# Patient Record
Sex: FEMALE | Race: BLACK OR AFRICAN AMERICAN | NOT HISPANIC OR LATINO | Employment: FULL TIME | URBAN - METROPOLITAN AREA
[De-identification: names, ages, dates, MRNs, and addresses within clinical notes are randomized per-mention and may not be internally consistent; named-entity substitution may affect disease eponyms.]

---

## 2018-05-07 LAB — PAP SMEAR: NORMAL

## 2019-01-28 ENCOUNTER — TELEPHONE (OUTPATIENT)
Dept: OPHTHALMOLOGY | Facility: CLINIC | Age: 59
End: 2019-01-28

## 2019-01-28 NOTE — TELEPHONE ENCOUNTER
----- Message from Chantal Johnson sent at 1/28/2019 12:52 PM CST -----  Type: Needs Medical Advice    Who Called: Patient  Symptoms (please be specific): hemorrhage behind the eye  How long has patient had these symptoms:  ongoing    Best Call Back Number:[phone] (home)     Additional Information:Stating was under the care of Dr. Gianluca Figueredo, due to insurance have to change opthamalogist. Stating was being treated hemorrhaging behind the eye

## 2019-01-30 ENCOUNTER — TELEPHONE (OUTPATIENT)
Dept: OPHTHALMOLOGY | Facility: CLINIC | Age: 59
End: 2019-01-30

## 2019-01-30 NOTE — TELEPHONE ENCOUNTER
----- Message from Yadira Hurtado sent at 1/30/2019  3:32 PM CST -----  Contact: Nohelia  Type: Needs Medical Advice    Who Called:  patient  Symptoms (please be specific):  Est care/issues behind the eye  Best Call Back Number: [phone]  Additional Information: Dr. Gianluca Figueredo referred her to Dr. Herrera--patient would like to see Dr. Herrera as soon as possible--please advise--thank you

## 2019-02-05 ENCOUNTER — PATIENT OUTREACH (OUTPATIENT)
Dept: ADMINISTRATIVE | Facility: HOSPITAL | Age: 59
End: 2019-02-05

## 2019-02-05 NOTE — LETTER
February 5, 2019    Nohelia Rodriguez  [address] [facility] [address]             [facility]  [address]  [facility] [address]  Phone: [phone] Dear Sharon, [facility] is committed to your overall health.  To help you get the most out of each of your visits, we will review your information to make sure you are up to date on all of your recommended tests and/or procedures.      As a new patient to Dr. Barrett , we may not have your complete medical records.  She has found that your chart shows you may be due for a One-time Hepatitis C Screening lab test(a viral condition that can harm the liver), Fasting cholesterol labs (Lipid Panel), Pap smear, Tetanus Immunization, Colon cancer screening, and Influenza Vaccine.     If you have had any of the above done at another facility, please bring the records or information with you so that your record at [facility] will be complete.      If you are currently taking medication, please bring it with you to your appointment for review.    Thank you for letting us care for you,    Sincerely,  Tiffany Granger  Clinical Care Coordinator  Phone: [phone]   Fax: [phone]

## 2019-02-05 NOTE — PROGRESS NOTES
Health Maintenance Due   Topic Date Due    Hepatitis C Screening  1960    Lipid Panel  1960    Pap Smear with HPV Cotest  02/21/1981    TETANUS VACCINE  05/11/2005    Colonoscopy  02/21/2010    Influenza Vaccine  08/01/2018     Pre-visit outreach via mail

## 2019-02-18 ENCOUNTER — TELEPHONE (OUTPATIENT)
Dept: GASTROENTEROLOGY | Facility: CLINIC | Age: 59
End: 2019-02-18

## 2019-02-18 ENCOUNTER — INITIAL CONSULT (OUTPATIENT)
Dept: OPHTHALMOLOGY | Facility: CLINIC | Age: 59
End: 2019-02-18
Payer: COMMERCIAL

## 2019-02-18 VITALS — HEART RATE: 89 BPM | SYSTOLIC BLOOD PRESSURE: 139 MMHG | DIASTOLIC BLOOD PRESSURE: 80 MMHG

## 2019-02-18 DIAGNOSIS — Z79.4 CONTROLLED TYPE 2 DIABETES MELLITUS WITH BOTH EYES AFFECTED BY PROLIFERATIVE RETINOPATHY AND MACULAR EDEMA, WITH LONG-TERM CURRENT USE OF INSULIN: Primary | ICD-10-CM

## 2019-02-18 DIAGNOSIS — H25.13 NS (NUCLEAR SCLEROSIS), BILATERAL: ICD-10-CM

## 2019-02-18 DIAGNOSIS — H35.012 RETINAL MACROANEURYSM, LEFT EYE: ICD-10-CM

## 2019-02-18 DIAGNOSIS — E11.3513 CONTROLLED TYPE 2 DIABETES MELLITUS WITH BOTH EYES AFFECTED BY PROLIFERATIVE RETINOPATHY AND MACULAR EDEMA, WITH LONG-TERM CURRENT USE OF INSULIN: Primary | ICD-10-CM

## 2019-02-18 PROCEDURE — 92004 COMPRE OPH EXAM NEW PT 1/>: CPT | Mod: 25,S$GLB,, | Performed by: OPHTHALMOLOGY

## 2019-02-18 PROCEDURE — 99999 PR PBB SHADOW E&M-EST. PATIENT-LVL III: CPT | Mod: PBBFAC,,, | Performed by: OPHTHALMOLOGY

## 2019-02-18 PROCEDURE — 67028 INJECTION EYE DRUG: CPT | Mod: LT,S$GLB,, | Performed by: OPHTHALMOLOGY

## 2019-02-18 PROCEDURE — 92004 PR EYE EXAM, NEW PATIENT,COMPREHESV: ICD-10-PCS | Mod: 25,S$GLB,, | Performed by: OPHTHALMOLOGY

## 2019-02-18 PROCEDURE — 67028 PR INJECT INTRAVITREAL PHARMCOLOGIC: ICD-10-PCS | Mod: LT,S$GLB,, | Performed by: OPHTHALMOLOGY

## 2019-02-18 PROCEDURE — 99999 PR PBB SHADOW E&M-EST. PATIENT-LVL III: ICD-10-PCS | Mod: PBBFAC,,, | Performed by: OPHTHALMOLOGY

## 2019-02-18 RX ORDER — METFORMIN HYDROCHLORIDE 1000 MG/1
1000 TABLET ORAL 2 TIMES DAILY WITH MEALS
COMMUNITY
Start: 2019-02-06 | End: 2020-02-06 | Stop reason: SDUPTHER

## 2019-02-18 RX ORDER — LATANOPROST 50 UG/ML
1 SOLUTION/ DROPS OPHTHALMIC NIGHTLY
COMMUNITY
Start: 2019-02-11

## 2019-02-18 RX ORDER — PIOGLITAZONEHYDROCHLORIDE 15 MG/1
TABLET ORAL
COMMUNITY
Start: 2019-02-11 | End: 2019-05-03

## 2019-02-18 RX ORDER — CYCLOBENZAPRINE HCL 10 MG
TABLET ORAL
COMMUNITY
Start: 2018-11-15 | End: 2019-05-03 | Stop reason: ALTCHOICE

## 2019-02-18 RX ORDER — INSULIN GLARGINE 100 [IU]/ML
INJECTION, SOLUTION SUBCUTANEOUS
COMMUNITY
Start: 2019-01-24 | End: 2020-01-13 | Stop reason: SDUPTHER

## 2019-02-18 RX ORDER — DILTIAZEM HYDROCHLORIDE EXTENDED-RELEASE TABLETS 300 MG/1
TABLET, EXTENDED RELEASE ORAL
COMMUNITY
Start: 2019-01-30 | End: 2019-05-03 | Stop reason: SDUPTHER

## 2019-02-18 RX ORDER — IRBESARTAN AND HYDROCHLOROTHIAZIDE 300; 12.5 MG/1; MG/1
TABLET, FILM COATED ORAL
COMMUNITY
Start: 2018-12-05 | End: 2019-06-14 | Stop reason: SDUPTHER

## 2019-02-18 RX ORDER — GLIMEPIRIDE 2 MG/1
TABLET ORAL
COMMUNITY
Start: 2019-01-24 | End: 2019-05-03

## 2019-02-18 RX ORDER — DILTIAZEM HYDROCHLORIDE 240 MG/1
CAPSULE, EXTENDED RELEASE ORAL
COMMUNITY
Start: 2019-01-24 | End: 2019-05-03

## 2019-02-18 RX ADMIN — Medication 1.25 MG: at 03:02

## 2019-02-18 NOTE — TELEPHONE ENCOUNTER
----- Message from Delores Pavon sent at 2/18/2019  9:46 AM CST -----  Contact: Patient  Type: Needs Medical Advice    Who Called:  Patient  Best Call Back Number:   Additional Information: Patient is calling to schedule her colonoscopy.Please call back and advise.

## 2019-02-18 NOTE — LETTER
February 18, 2019      JUAN Marie MD  [address]  [address]  [facility]  [facility] [facility] [address]           [facility]  [address] [facility] [address] [place] [address]  Phone: [phone]  Fax: [phone]          Patient: Nohelia Rodriguez   MR Number: [id number]   YOB: 1960   Date of Visit: 2/18/2019       Dear Dr. JUAN Marie:    Thank you for referring Nohelia Rodriguez to me for evaluation. Attached you will find relevant portions of my assessment and plan of care.    If you have questions, please do not hesitate to call me. I look forward to following Nohelia Rodriguez along with you.    Sincerely,    RUFINO Herrera MD    Enclosure  CC:  No Recipients    If you would like to receive this communication electronically, please contact [email or web address] or [phone] to request more information on [employer] access.    For providers and/or their staff who would like to refer a patient to [facility], please contact us through our one-stop-shop provider referral line, [facility], at [phone].    If you feel you have received this communication in error or would no longer like to receive these types of communications, please e-mail [email or web address]

## 2019-02-18 NOTE — PATIENT INSTRUCTIONS
.POST INTRAVITREAL INJECTION PATIENT INSTRUCTIONS   Below are some guidelines for what to expect after your treatment and additional care instructions.   * you may experience mild discomfort in your eye after the treatment. Your eye usually feels better within 24 to 48 hours after the procedure.   * you have been given drops that numb the surface of your eye.   DO NOT rub or touch your eye, DO NOT wear contacts until numbness goes away.   * you may experience small spots that appear in your field of vision, these are usually caused by an air bubble or from the medication. It taked a few hours or days for these to go away.   * use of sunglasses will help reduce light sensitivity and glare.   * DO NOT swim   for at least 3 hours after the injection   * If you have a gritty, burning, irritating or stinging feeling in the injected eye. This may be a result of the antiseptic used. Use artifical tears or eye lubricant ( from over- the- counter from your local pharmacy ). If you have some at home already please check the expiration date, so not to use anything contaminated in your eye. A cool pack over your eye brow above the injected eye may also relieve discomfort.   Call us right away or go to the Emergency Department if you have a dramatic decrease in vision or extreme pain in the eye.   [facility] [phone]    .Controlling High Blood Pressure  High blood pressure (hypertension) is often called the silent killer. This is because many people who have it dont know it. High blood pressure is defined as 140/90 mm Hg or higher. Know your blood pressure and remember to check it regularly. Doing so can save your life. Here are some things you can do to help control your blood pressure.    Choose heart-healthy foods  · Select low-salt, low-fat foods. Limit sodium intake to 2,400 mg per day or the amount suggested by your healthcare provider.  · Limit canned, dried, cured, packaged, and fast foods. These  can contain a lot of salt.  · Eat 8 to 10 servings of fruits and vegetables every day.  · Choose lean meats, fish, or chicken.  · Eat whole-grain pasta, brown rice, and beans.  · Eat 2 to 3 servings of low-fat or fat-free dairy products.  · Ask your doctor about the DASH eating plan. This plan helps reduce blood pressure.  · When you go to a restaurant, ask that your meal be prepared with no added salt.  Maintain a healthy weight  · Ask your healthcare provider how many calories to eat a day. Then stick to that number.  · Ask your healthcare provider what weight range is healthiest for you. If you are overweight, a weight loss of only 3% to 5% of your body weight can help lower blood pressure. Generally, a good weight loss goal is to lose 10% of your body weight in a year.  · Limit snacks and sweets.  · Get regular exercise.  Get up and get active  · Choose activities you enjoy. Find ones you can do with friends or family. This includes bicycling, dancing, walking, and jogging.  · Park farther away from building entrances.  · Use stairs instead of the elevator.  · When you can, walk or bike instead of driving.  · [place] leaves, garden, or do household repairs.  · Be active at a moderate to vigorous level of physical activity for at least 40 minutes for a minimum of 3 to 4 days a week.   Manage stress  · Make time to relax and enjoy life. Find time to laugh.  · Communicate your concerns with your loved ones and your healthcare provider.  · Visit with family and friends, and keep up with hobbies.  Limit alcohol and quit smoking  · Men should have no more than 2 drinks per day.  · Women should have no more than 1 drink per day.  · Talk with your healthcare provider about quitting smoking. Smoking significantly increases your risk for heart disease and stroke. Ask your healthcare provider about community smoking cessation programs and other options.  Medicines  If lifestyle changes arent enough, your healthcare provider  may prescribe high blood pressure medicine. Take all medicines as prescribed. If you have any questions about your medicines, ask your healthcare provider before stopping or changing them.    © 5189-0518 [employer]. [address] [place] [address], [place] [address]. All rights reserved. This information is not intended as a substitute for professional medical care. Always follow your healthcare professional's instructions.

## 2019-02-18 NOTE — PROGRESS NOTES
HPI     Eye Problem      Additional comments: heme OS              Comments     Patient was getting injections OS with Dr Marie until insurance changed. She was last injected in December. She had been getting injections OD also but OD has been stable for a while now. She is also DM which is well controlled per patient    LBS=75 this am  A1C=7. ?(1/19)    Latanoprost qhs OU          Last edited by Digna Castillo on 2/18/2019  2:33 PM. (History)        OCT - OD - no ME  OS - temporal ME a/w EMILE      A/P    1. PDR OU  S/p PRP OD with PSO'S  ?not high risk OS - will follow up FA    2. DME OS -   A/w EMILE  S/p multiple injections with PSO'S    Avastin OS today    Will need laser to EMILE    3. HTN Ret OU  BS/BP/chol control    4. NS OU  No VS      1 month OCT/FA OS    Risks, benefits, and alternatives to treatment discussed in detail with the patient.  The patient voiced understanding and wished to proceed with the procedure    Injection Procedure Note:  Diagnosis: DME OS    Patient Identified and Time Out complete  Topical Proparacaine and Betadine.  Inject Avastin OS at 6:00 @ 3.5-4mm posterior to limbus  Post Operative Dx: Same  Complications: None  Follow up as above.

## 2019-02-19 ENCOUNTER — TELEPHONE (OUTPATIENT)
Dept: OPHTHALMOLOGY | Facility: CLINIC | Age: 59
End: 2019-02-19

## 2019-03-01 ENCOUNTER — TELEPHONE (OUTPATIENT)
Dept: OPHTHALMOLOGY | Facility: CLINIC | Age: 59
End: 2019-03-01

## 2019-03-01 NOTE — TELEPHONE ENCOUNTER
----- Message from Thomas Edwards sent at 3/1/2019  9:52 AM CST -----  Type: Needs Medical Advice    Who Called:  Patient    Best Call Back Number: [phone]  Additional Information: Patient states that she would like a callback regarding rescheduling her procedure on 03/21

## 2019-03-04 ENCOUNTER — TELEPHONE (OUTPATIENT)
Dept: OPTOMETRY | Facility: CLINIC | Age: 59
End: 2019-03-04

## 2019-03-04 NOTE — TELEPHONE ENCOUNTER
----- Message from Karlos Valentine sent at 3/4/2019  1:40 PM CST -----  Contact: Patient  Type: Needs Medical Advice    Who Called:  Patient  Best Call Back Number: [phone]  Additional Information: Patient is still waiting for a call back to reschedule an appointment. Please call to advise. Thanks!

## 2019-03-22 ENCOUNTER — ANESTHESIA (OUTPATIENT)
Dept: ENDOSCOPY | Facility: HOSPITAL | Age: 59
End: 2019-03-22
Payer: COMMERCIAL

## 2019-03-22 ENCOUNTER — ANESTHESIA EVENT (OUTPATIENT)
Dept: ENDOSCOPY | Facility: HOSPITAL | Age: 59
End: 2019-03-22
Payer: COMMERCIAL

## 2019-03-22 ENCOUNTER — TELEPHONE (OUTPATIENT)
Dept: GASTROENTEROLOGY | Facility: CLINIC | Age: 59
End: 2019-03-22

## 2019-03-22 ENCOUNTER — HOSPITAL ENCOUNTER (OUTPATIENT)
Facility: HOSPITAL | Age: 59
Discharge: HOME | End: 2019-03-22
Attending: INTERNAL MEDICINE | Admitting: INTERNAL MEDICINE
Payer: COMMERCIAL

## 2019-03-22 VITALS
TEMPERATURE: 98 F | WEIGHT: 200 LBS | HEART RATE: 74 BPM | OXYGEN SATURATION: 98 % | BODY MASS INDEX: 29.62 KG/M2 | DIASTOLIC BLOOD PRESSURE: 68 MMHG | HEIGHT: 69 IN | SYSTOLIC BLOOD PRESSURE: 140 MMHG | RESPIRATION RATE: 16 BRPM

## 2019-03-22 DIAGNOSIS — Z12.11 ENCOUNTER FOR SCREENING COLONOSCOPY: Primary | ICD-10-CM

## 2019-03-22 DIAGNOSIS — Z12.11 SCREEN FOR COLON CANCER: ICD-10-CM

## 2019-03-22 LAB — GLUCOSE SERPL-MCNC: 82 MG/DL (ref 70–110)

## 2019-03-22 PROCEDURE — D9220A PRA ANESTHESIA: ICD-10-PCS | Mod: ANES,,, | Performed by: NURSE ANESTHETIST, CERTIFIED REGISTERED

## 2019-03-22 PROCEDURE — D9220A PRA ANESTHESIA: Mod: ANES,,, | Performed by: NURSE ANESTHETIST, CERTIFIED REGISTERED

## 2019-03-22 PROCEDURE — 37000008 HC ANESTHESIA 1ST 15 MINUTES: Mod: PO | Performed by: INTERNAL MEDICINE

## 2019-03-22 PROCEDURE — G0121 COLON CA SCRN NOT HI RSK IND: HCPCS | Mod: 53,,, | Performed by: INTERNAL MEDICINE

## 2019-03-22 PROCEDURE — D9220A PRA ANESTHESIA: Mod: CRNA,,, | Performed by: ANESTHESIOLOGY

## 2019-03-22 PROCEDURE — D9220A PRA ANESTHESIA: ICD-10-PCS | Mod: CRNA,,, | Performed by: ANESTHESIOLOGY

## 2019-03-22 PROCEDURE — G0121 COLON CA SCRN NOT HI RSK IND: HCPCS | Mod: PO | Performed by: INTERNAL MEDICINE

## 2019-03-22 PROCEDURE — G0121 COLON CA SCRN NOT HI RSK IND: ICD-10-PCS | Mod: 53,,, | Performed by: INTERNAL MEDICINE

## 2019-03-22 PROCEDURE — 37000009 HC ANESTHESIA EA ADD 15 MINS: Mod: PO | Performed by: INTERNAL MEDICINE

## 2019-03-22 PROCEDURE — 25000003 PHARM REV CODE 250: Mod: PO | Performed by: INTERNAL MEDICINE

## 2019-03-22 PROCEDURE — 63600175 PHARM REV CODE 636 W HCPCS: Mod: PO | Performed by: NURSE ANESTHETIST, CERTIFIED REGISTERED

## 2019-03-22 RX ORDER — SODIUM CHLORIDE 0.9 % (FLUSH) 0.9 %
3 SYRINGE (ML) INJECTION
Status: DISCONTINUED | OUTPATIENT
Start: 2019-03-22 | End: 2019-03-22 | Stop reason: HOSPADM

## 2019-03-22 RX ORDER — SODIUM CHLORIDE, SODIUM LACTATE, POTASSIUM CHLORIDE, CALCIUM CHLORIDE 600; 310; 30; 20 MG/100ML; MG/100ML; MG/100ML; MG/100ML
INJECTION, SOLUTION INTRAVENOUS CONTINUOUS
Status: DISCONTINUED | OUTPATIENT
Start: 2019-03-22 | End: 2019-03-22 | Stop reason: HOSPADM

## 2019-03-22 RX ORDER — PROPOFOL 10 MG/ML
VIAL (ML) INTRAVENOUS
Status: DISCONTINUED | OUTPATIENT
Start: 2019-03-22 | End: 2019-03-22

## 2019-03-22 RX ORDER — LIDOCAINE HYDROCHLORIDE 10 MG/ML
1 INJECTION, SOLUTION EPIDURAL; INFILTRATION; INTRACAUDAL; PERINEURAL ONCE
Status: COMPLETED | OUTPATIENT
Start: 2019-03-22 | End: 2019-03-22

## 2019-03-22 RX ORDER — LIDOCAINE HCL/PF 100 MG/5ML
SYRINGE (ML) INTRAVENOUS
Status: DISCONTINUED | OUTPATIENT
Start: 2019-03-22 | End: 2019-03-22

## 2019-03-22 RX ADMIN — SALINE LAXATIVE 1 ENEMA: 7; 19 ENEMA RECTAL at 09:03

## 2019-03-22 RX ADMIN — PROPOFOL 30 MG: 10 INJECTION, EMULSION INTRAVENOUS at 10:03

## 2019-03-22 RX ADMIN — LIDOCAINE HYDROCHLORIDE 75 MG: 20 INJECTION, SOLUTION INTRAVENOUS at 10:03

## 2019-03-22 RX ADMIN — PROPOFOL 120 MG: 10 INJECTION, EMULSION INTRAVENOUS at 10:03

## 2019-03-22 RX ADMIN — SODIUM CHLORIDE, SODIUM LACTATE, POTASSIUM CHLORIDE, AND CALCIUM CHLORIDE: .6; .31; .03; .02 INJECTION, SOLUTION INTRAVENOUS at 09:03

## 2019-03-22 RX ADMIN — LIDOCAINE HYDROCHLORIDE 5 MG: 10 INJECTION, SOLUTION EPIDURAL; INFILTRATION; INTRACAUDAL; PERINEURAL at 09:03

## 2019-03-22 RX ADMIN — PROPOFOL 40 MG: 10 INJECTION, EMULSION INTRAVENOUS at 10:03

## 2019-03-22 NOTE — ANESTHESIA PREPROCEDURE EVALUATION
03/22/2019  Nohelia Rodriguez is a 59 y.o., female.    Anesthesia Evaluation    I have reviewed the Patient Summary Reports.    I have reviewed the Nursing Notes.      Review of Systems  Anesthesia Hx:  No problems with previous Anesthesia    Cardiovascular:   Hypertension, well controlled    Endocrine:   Diabetes, well controlled, type 2        Physical Exam  General:  Obesity    Airway/Jaw/Neck:  Airway Findings: Mallampati: II                Anesthesia Plan  Type of Anesthesia, risks & benefits discussed:  Anesthesia Type:  general  Patient's Preference:   Intra-op Monitoring Plan:   Intra-op Monitoring Plan Comments:   Post Op Pain Control Plan:   Post Op Pain Control Plan Comments:   Induction:   IV  Beta Blocker:  Patient is not currently on a Beta-Blocker (No further documentation required).       Informed Consent: Patient understands risks and agrees with Anesthesia plan.  Questions answered. Anesthesia consent signed with patient.  ASA Score: 2     Day of Surgery Review of History & Physical:    H&P update referred to the surgeon.         Ready For Surgery From Anesthesia Perspective.

## 2019-03-22 NOTE — TRANSFER OF CARE
"Anesthesia Transfer of Care Note    Patient: Nohelia Rodriguez    Procedure(s) Performed: Procedure(s) (LRB):  COLONOSCOPY (N/A)    Patient location: PACU    Anesthesia Type: general    Transport from OR: Transported from OR on room air with adequate spontaneous ventilation    Post pain: adequate analgesia    Post assessment: no apparent anesthetic complications    Post vital signs: stable    Level of consciousness: awake and sedated    Nausea/Vomiting: no nausea/vomiting    Complications: none    Transfer of care protocol was followed      Last vitals:   Visit Vitals  BP (!) 198/87 (BP Location: Right arm, Patient Position: Sitting)   Pulse 94   Temp 36.3 °C (97.3 °F) (Skin)   Resp 16   Ht 5' 9" (1.753 m)   Wt 90.7 kg (200 lb)   SpO2 97%   Breastfeeding? No   BMI 29.53 kg/m²     "

## 2019-03-22 NOTE — DISCHARGE INSTRUCTIONS
Recovery After Procedural Sedation (Adult)  You have been given medicine by vein to make you sleep during your surgery. This may have included both a pain medicine and sleeping medicine. Most of the effects have worn off. But you may still have some drowsiness for the next 6 to 8 hours.  Home care  Follow these guidelines when you get home:  · For the next 8 hours, you should be watched by a responsible adult. This person should make sure your condition is not getting worse.  · Don't drink any alcohol for the next 24 hours.  · Don't drive, operate dangerous machinery, or make important business or personal decisions during the next 24 hours.  Note: Your healthcare provider may tell you not to take any medicine by mouth for pain or sleep in the next 4 hours. These medicines may react with the medicines you were given in the hospital. This could cause a much stronger response than usual.  Follow-up care  Follow up with your healthcare provider if you are not alert and back to your usual level of activity within 12 hours.  When to seek medical advice  Call your healthcare provider right away if any of these occur:  · Drowsiness gets worse  · Weakness or dizziness gets worse  · Repeated vomiting  · You can't be awakened   Date Last Reviewed: 10/18/2016  © 9206-9900 [employer]. [address] [place] [address], [place] [address]. All rights reserved. This information is not intended as a substitute for professional medical care. Always follow your healthcare professional's instructions.

## 2019-03-22 NOTE — H&P
History & Physical - Short Stay  Gastroenterology      SUBJECTIVE:     Procedure: Colonoscopy    Chief Complaint/Indication for Procedure: Screening    PTA Medications   Medication Sig    CARTIA  mg 24 hr capsule     cyclobenzaprine (FLEXERIL) 10 MG tablet     diltiaZEM (CARDIZEM [place]) 300 mg 24 hr tablet     glimepiride (AMARYL) 2 MG tablet     irbesartan-hydrochlorothiazide (AVALIDE) 300-12.5 mg per tablet     LANTUS SOLOSTAR U-100 INSULIN glargine 100 units/mL (3mL) SubQ pen     latanoprost 0.005 % ophthalmic solution     metFORMIN (GLUCOPHAGE) 1000 MG tablet     pioglitazone (ACTOS) 15 MG tablet     FLUZONE QUAD 8735-8070, PF, 60 mcg (15 mcg x 4)/0.5 mL Syrg        Review of patient's allergies indicates:  No Known Allergies     Past Medical History:   Diagnosis Date    Diabetes mellitus     Glaucoma     Hypertension      History reviewed. No pertinent surgical history.  Family History   Problem Relation Age of Onset    Diabetes Mother     Cataracts Mother     Diabetes Sister     Diabetes Brother      Social History     Tobacco Use    Smoking status: Never Smoker    Smokeless tobacco: Never Used   Substance Use Topics    Alcohol use: No     Frequency: Never    Drug use: No         OBJECTIVE:     Vital Signs (Most Recent)  Temp: 97.3 °F (36.3 °C) (03/22/19 0941)  Pulse: 94 (03/22/19 0941)  Resp: 16 (03/22/19 0941)  BP: (!) 198/87 (03/22/19 0941)  SpO2: 97 % (03/22/19 0941)    Physical Exam                                                        GENERAL:  Comfortable, in no acute distress.                                 HEENT EXAM:  Nonicteric.  No adenopathy.  Oropharynx is clear.               NECK:  Supple.                                                               LUNGS:  Clear.                                                               CARDIAC:  Regular rate and rhythm.  S1, S2.  No murmur.                      ABDOMEN:  Soft, positive bowel sounds, nontender.  No  hepatosplenomegaly or masses.  No rebound or guarding.                                             EXTREMITIES:  No edema.     MENTAL STATUS:  Normal, alert and oriented.      ASSESSMENT/PLAN:     Assessment: Colorectal cancer screening    Plan: Colonoscopy    Anesthesia Plan: General    ASA Grade: ASA 2 - Patient with mild systemic disease with no functional limitations    MALLAMPATI SCORE:  I (soft palate, uvula, fauces, and tonsillar pillars visible)

## 2019-03-22 NOTE — PLAN OF CARE
VSS, all questions answered. Denies recent fever or illness. Fleets x1 administered per pt in preop per Dr. Membreno order. Pt reports yellow liquid stool following fleets. Dr. Membreno notified, ok to proceed with procedure. Pt hypertensive in preop- Dr. Reed notified, no new orders received. Pt states ready for procedure.

## 2019-03-22 NOTE — TELEPHONE ENCOUNTER
----- Message from Attila Estrada sent at 3/22/2019 11:35 AM CDT -----  Contact: Patient  Type: Needs Medical Advice    Who Called:  Patient  Best Call Back Number: [phone]  Additional Information: Patient was told today by Temi that she would need to have air contrast barium enema test. Please advise for orders and scheduling. She would like to have it done on 4/1/19 before procedure with Sharon. Please advise if this can be done

## 2019-03-22 NOTE — [employer] PATIENT INSTRUCTIONS
Discharge Summary/Instructions after an Endoscopic Procedure  Patient Name: Nohelia Rodriguez  Patient MRN: [id number]  Patient YOB: 1960 Friday, March 22, 2019  Kishor Membreno MD  RESTRICTIONS:  During your procedure today, you received medications for sedation.  These   medications may affect your judgment, balance and coordination.  Therefore,   for 24 hours, you have the following restrictions:   - DO NOT drive a car, operate machinery, make legal/financial decisions,   sign important papers or drink alcohol.    ACTIVITY:  Today: no heavy lifting, straining or running due to procedural   sedation/anesthesia.  The following day: return to full activity including work.  DIET:  Eat and drink normally unless instructed otherwise.     TREATMENT FOR COMMON SIDE EFFECTS:  - Mild abdominal pain, nausea, belching, bloating or excessive gas:  rest,   eat lightly and use a heating pad.  - Sore Throat: treat with throat lozenges and/or gargle with warm salt   water.  - Because air was used during the procedure, expelling large amounts of air   from your rectum or belching is normal.  - If a bowel prep was taken, you may not have a bowel movement for 1-3 days.    This is normal.  SYMPTOMS TO WATCH FOR AND REPORT TO YOUR PHYSICIAN:  1. Abdominal pain or bloating, other than gas cramps.  2. Chest pain.  3. Back pain.  4. Signs of infection such as: chills or fever occurring within 24 hours   after the procedure.  5. Rectal bleeding, which would show as bright red, maroon, or black stools.   (A tablespoon of blood from the rectum is not serious, especially if   hemorrhoids are present.)  6. Vomiting.  7. Weakness or dizziness.  GO DIRECTLY TO THE NEAREST EMERGENCY ROOM IF YOU HAVE ANY OF THE FOLLOWING:      Difficulty breathing              Chills and/or fever over 101 F   Persistent vomiting and/or vomiting blood   Severe abdominal pain   Severe chest pain   Black, tarry stools   Bleeding- more than one  tablespoon   Any other symptom or condition that you feel may need urgent attention  Your doctor recommends these additional instructions:  If any biopsies were taken, your doctors clinic will contact you in 1 to 2   weeks with any results.  Your physician has recommended an air contrast barium enema at appointment   to be scheduled.   Your physician has recommended a repeat colonoscopy in 10 years for   screening purposes.   You are being discharged to home.  For questions, problems or results please call your physician - Kishor Membreno MD at Work:  [phone].  EMERGENCY PHONE NUMBER: [phone], LAB RESULTS: [phone]  IF A COMPLICATION OR EMERGENCY SITUATION ARISES AND YOU ARE UNABLE TO REACH   YOUR PHYSICIAN - GO DIRECTLY TO THE EMERGENCY ROOM.  ___________________________________________  Nurse Signature  ___________________________________________  Patient/Designated Responsible Party Signature  Kishor Membreno MD  3/22/2019 10:29:53 AM  This report has been verified and signed electronically.  [employer]

## 2019-03-22 NOTE — ANESTHESIA POSTPROCEDURE EVALUATION
"Anesthesia Post Evaluation    Patient: Nohelia Rodriguez    Procedure(s) Performed: Procedure(s) (LRB):  COLONOSCOPY (N/A)    Final Anesthesia Type: general  Patient location during evaluation: PACU  Patient participation: Yes- Able to Participate  Level of consciousness: awake and alert  Post-procedure vital signs: reviewed and stable  Pain management: adequate  Airway patency: patent  PONV status at discharge: No PONV  Anesthetic complications: no      Cardiovascular status: blood pressure returned to baseline and hemodynamically stable  Respiratory status: unassisted  Hydration status: euvolemic  Follow-up not needed.        Visit Vitals  BP (!) 198/87 (BP Location: Right arm, Patient Position: Sitting)   Pulse 94   Temp 36.3 °C (97.3 °F) (Skin)   Resp 16   Ht 5' 9" (1.753 m)   Wt 90.7 kg (200 lb)   SpO2 97%   Breastfeeding? No   BMI 29.53 kg/m²       Pain/Jenifer Score: No Data Recorded      "

## 2019-03-22 NOTE — DISCHARGE SUMMARY
Discharge Note  Short Stay      SUMMARY     Admit Date: 3/22/2019    Attending Physician: Kishor Membreno MD     Discharge Physician: Kishor Membreno MD    Discharge Date: 3/22/2019 10:30 AM    Final Diagnosis: Screen for colon cancer [Z12.11]    Disposition: HOME OR SELF CARE    Patient Instructions:   Current Discharge Medication List      CONTINUE these medications which have NOT CHANGED    Details   CARTIA  mg 24 hr capsule       cyclobenzaprine (FLEXERIL) 10 MG tablet       diltiaZEM (CARDIZEM [place]) 300 mg 24 hr tablet       glimepiride (AMARYL) 2 MG tablet       irbesartan-hydrochlorothiazide (AVALIDE) 300-12.5 mg per tablet       LANTUS SOLOSTAR U-100 INSULIN glargine 100 units/mL (3mL) SubQ pen       latanoprost 0.005 % ophthalmic solution       metFORMIN (GLUCOPHAGE) 1000 MG tablet       pioglitazone (ACTOS) 15 MG tablet       FLUZONE QUAD 1340-9797, PF, 60 mcg (15 mcg x 4)/0.5 mL Syrg              Discharge Procedure Orders (must include Diet, Follow-up, Activity)    Follow Up:  Follow up with PCP as previously scheduled  Resume routine diet.  Activity as tolerated.    No driving day of procedure.

## 2019-03-26 ENCOUNTER — TELEPHONE (OUTPATIENT)
Dept: GASTROENTEROLOGY | Facility: CLINIC | Age: 59
End: 2019-03-26

## 2019-03-26 NOTE — TELEPHONE ENCOUNTER
BE scheduled info letter mailed to pt instr she will be notified of results when reviewed by sara VILLARREAL

## 2019-03-26 NOTE — TELEPHONE ENCOUNTER
----- Message from Karlos Valentine sent at 3/26/2019 10:03 AM CDT -----  Contact: Patient  Type:  Sooner Apoointment Request    Caller is requesting a sooner appointment.  Caller declined first available appointment listed below.  Caller will not accept being placed on the waitlist and is requesting a message be sent to doctor.    Name of Caller:  Patient  When is the first available appointment?  5/14  Symptoms:  Follow up  Best Call Back Number:  [phone]  Additional Information:  NA

## 2019-04-01 ENCOUNTER — PROCEDURE VISIT (OUTPATIENT)
Dept: OPHTHALMOLOGY | Facility: CLINIC | Age: 59
End: 2019-04-01
Attending: OPHTHALMOLOGY
Payer: COMMERCIAL

## 2019-04-01 VITALS — SYSTOLIC BLOOD PRESSURE: 178 MMHG | HEART RATE: 91 BPM | DIASTOLIC BLOOD PRESSURE: 87 MMHG

## 2019-04-01 DIAGNOSIS — Z79.4 CONTROLLED TYPE 2 DIABETES MELLITUS WITH BOTH EYES AFFECTED BY PROLIFERATIVE RETINOPATHY AND MACULAR EDEMA, WITH LONG-TERM CURRENT USE OF INSULIN: Primary | ICD-10-CM

## 2019-04-01 DIAGNOSIS — E11.3513 CONTROLLED TYPE 2 DIABETES MELLITUS WITH BOTH EYES AFFECTED BY PROLIFERATIVE RETINOPATHY AND MACULAR EDEMA, WITH LONG-TERM CURRENT USE OF INSULIN: Primary | ICD-10-CM

## 2019-04-01 DIAGNOSIS — H35.012 RETINAL MACROANEURYSM, LEFT EYE: ICD-10-CM

## 2019-04-01 DIAGNOSIS — H25.13 NS (NUCLEAR SCLEROSIS), BILATERAL: ICD-10-CM

## 2019-04-01 PROCEDURE — 67028 PR INJECT INTRAVITREAL PHARMCOLOGIC: ICD-10-PCS | Mod: LT,S$GLB,, | Performed by: OPHTHALMOLOGY

## 2019-04-01 PROCEDURE — 92134 CPTRZ OPH DX IMG PST SGM RTA: CPT | Mod: S$GLB,,, | Performed by: OPHTHALMOLOGY

## 2019-04-01 PROCEDURE — 92014 PR EYE EXAM, EST PATIENT,COMPREHESV: ICD-10-PCS | Mod: 25,S$GLB,, | Performed by: OPHTHALMOLOGY

## 2019-04-01 PROCEDURE — 92235 FLUORESCEIN ANGRPH MLTIFRAME: CPT | Mod: S$GLB,,, | Performed by: OPHTHALMOLOGY

## 2019-04-01 PROCEDURE — 92014 COMPRE OPH EXAM EST PT 1/>: CPT | Mod: 25,S$GLB,, | Performed by: OPHTHALMOLOGY

## 2019-04-01 PROCEDURE — 92134 POSTERIOR SEGMENT OCT RETINA (OCULAR COHERENCE TOMOGRAPHY)-BOTH EYES: ICD-10-PCS | Mod: S$GLB,,, | Performed by: OPHTHALMOLOGY

## 2019-04-01 PROCEDURE — 92235 FLUORESCEIN ANGIOGRAPHY - OU - BOTH EYES: ICD-10-PCS | Mod: S$GLB,,, | Performed by: OPHTHALMOLOGY

## 2019-04-01 PROCEDURE — 67028 INJECTION EYE DRUG: CPT | Mod: LT,S$GLB,, | Performed by: OPHTHALMOLOGY

## 2019-04-01 RX ADMIN — Medication 1.25 MG: at 12:04

## 2019-04-01 NOTE — PROGRESS NOTES
HPI     1 month OCT/FA OS  DLS- 02/18/2019 Dr. Herrera     Pt sts vision seems to be stable no change noticed. Denies pain     (-)Flashes (-)Floaters  (-)Photophobia  (-)Glare    Latanoprost QHS OU x 1 year     HPI     Eye Problem      Additional comments: heme OS              Comments     Patient was getting injections OS with Dr Marie until insurance changed. She was last injected in December. She had been getting injections OD also but OD has been stable for a while now. She is also DM which is well controlled per patient    LBS=75 this am  A1C=7. ?(1/19)    Latanoprost qhs OU          Last edited by Digna Castillo on 2/18/2019  2:33 PM. (History)        OCT - OD - no ME  OS - temporal ME a/w EMILE    FA - late macular leakage OS  NO NV OU      A/P    1. PDR OU  S/p PRP OD with PSO'S  ?not high risk OS - will follow up FA    2. DME OS -   A/w EMILE  S/p multiple injections with PSO'S  S/p Avastin OS x 1 with me    Avastin OS today    Will need laser to EMILE when stable    3. HTN Ret OU  BS/BP/chol control    4. NS OU  No VS      1 month OCT    Risks, benefits, and alternatives to treatment discussed in detail with the patient.  The patient voiced understanding and wished to proceed with the procedure    Injection Procedure Note:  Diagnosis: DME OS    Patient Identified and Time Out complete  Topical Proparacaine and Betadine.  Inject Avastin OS at 6:00 @ 3.5-4mm posterior to limbus  Post Operative Dx: Same  Complications: None  Follow up as above.

## 2019-04-01 NOTE — PATIENT INSTRUCTIONS
Fluorescein Angiography     A fluorescein angiogram of the retina   Fluorescein angiography is an eye test. It is done to look at the back of your eye, including:  · The blood vessels in your eye  · The layer of tissue at the back of your eye (the retina)  · The center of your retina (the macula)  · The optic nerve  This test can diagnose diseases found in these areas. It can also diagnose other conditions that affect these areas. To do this test, a dye called fluorescein is shot (injected) into your arm. The dye goes into your bloodstream and up into the blood vessels in your eyes. A special camera is then used to take images (angiograms) of your eyes.  Getting ready for your test  Tell your healthcare provider if you:  · Are pregnant or think you may be pregnant  · Are breastfeeding  · Have a history of severe allergic reactions, including to X-ray dye or other medicines  · Have kidney problems  Tell your provider about any medicines you are taking. You may need to stop taking all or some of these before the test. This includes:  · All prescription medicines  · Over-the-counter medicines such as aspirin or ibuprofen  · Street drugs  · Herbs, vitamins, and other supplements  You should arrange for an adult family member or friend to drive you home after your test. Your vision will be blurry for up to 12 hours.  Follow any other instructions from your healthcare provider.  During your test  · You are given eye drops to enlarge (dilate) your pupils.  · You then sit in front of a special camera. You place your chin on the chin rest and look into the camera.  · Images are taken of your eyes, one eye at a time.  · Fluorescein dye is then injected into your arm. The lights in the room are turned off. You may have mild nausea. You may have a warm feeling in your arm or upper body. Tell your provider if your skin feels itchy or if you are having trouble breathing. If so, you could be having an allergic reaction to the  dye.  · More pictures of your eyes are taken over 15 to 30 minutes. The camera shines a bright light into your eyes. Try to keep your head still and your eyes open.  · When enough images have been taken, the test is over.  After your test  Your vision will be blurry for up to 4 to 12 hours. This is because of your dilated pupils. Your eye will be more sensitive to light for up to 12 hours. You may want to wear sunglasses during this time. Do not drive if your vision is very blurry. You may also find it uncomfortable to read. Your skin may look yellow for a few hours. This is from the dye. Your urine will be bright yellow or orange for 24 to 48 hours after the test.     Risks and possible complications  All procedures have some risks. Possible risks of fluorescein angiography include:  · Upset stomach (nausea) and vomiting  · Leaking dye around the injection site that causes pain and swelling  · Metallic taste in your mouth  · Infection at injection site  · Allergic reaction to the dye  · Dry mouth or too much saliva  · Faster heart rate  · Sweating  · Lower back pain   Date Last Reviewed: 5/30/2015  © 6860-2943 [employer]. [address] [place] [address]. All rights reserved. This information is not intended as a substitute for professional medical care. Always follow your healthcare professional's instructions.      .POST INTRAVITREAL INJECTION PATIENT INSTRUCTIONS   Below are some guidelines for what to expect after your treatment and additional care instructions.   * you may experience mild discomfort in your eye after the treatment. Your eye usually feels better within 24 to 48 hours after the procedure.   * you have been given drops that numb the surface of your eye.   DO NOT rub or touch your eye, DO NOT wear contacts until numbness goes away.   * you may experience small spots that appear in your field of vision, these are usually caused by an air bubble or from the medication. It  taked a few hours or days for these to go away.   * use of sunglasses will help reduce light sensitivity and glare.   * DO NOT swim   for at least 3 hours after the injection   * If you have a gritty, burning, irritating or stinging feeling in the injected eye. This may be a result of the antiseptic used. Use artifical tears or eye lubricant ( from over- the- counter from your local pharmacy ). If you have some at home already please check the expiration date, so not to use anything contaminated in your eye. A cool pack over your eye brow above the injected eye may also relieve discomfort.   Call us right away or go to the Emergency Department if you have a dramatic decrease in vision or extreme pain in the eye.   [facility] [phone]

## 2019-04-15 ENCOUNTER — TELEPHONE (OUTPATIENT)
Dept: GASTROENTEROLOGY | Facility: CLINIC | Age: 59
End: 2019-04-15

## 2019-04-15 NOTE — TELEPHONE ENCOUNTER
----- Message from Silvia Joseph sent at 4/15/2019 11:42 AM CDT -----    Pt  Is calling to  Reschedule  The   Xray   Procedure // please call  [phone]

## 2019-04-15 NOTE — TELEPHONE ENCOUNTER
Spoke with pt. Rescheduled xray. Pt to pass by clinic to  instructions. Pt verbalized understanding to all.

## 2019-04-25 ENCOUNTER — TELEPHONE (OUTPATIENT)
Dept: GASTROENTEROLOGY | Facility: CLINIC | Age: 59
End: 2019-04-25

## 2019-04-25 NOTE — TELEPHONE ENCOUNTER
----- Message from Pippa Boss sent at 4/25/2019  8:39 AM CDT -----  Contact: Nohelia hickey  Type: Needs Medical Advice    Who Called:  Nohelia Richards Call Back Number: [phone]  Additional Information: Pls call pt regarding a procedure that needs to be set up.

## 2019-04-29 ENCOUNTER — PROCEDURE VISIT (OUTPATIENT)
Dept: OPHTHALMOLOGY | Facility: CLINIC | Age: 59
End: 2019-04-29
Payer: COMMERCIAL

## 2019-04-29 VITALS — DIASTOLIC BLOOD PRESSURE: 94 MMHG | SYSTOLIC BLOOD PRESSURE: 179 MMHG | HEART RATE: 95 BPM

## 2019-04-29 DIAGNOSIS — H25.13 NS (NUCLEAR SCLEROSIS), BILATERAL: ICD-10-CM

## 2019-04-29 DIAGNOSIS — E11.3513 CONTROLLED TYPE 2 DIABETES MELLITUS WITH BOTH EYES AFFECTED BY PROLIFERATIVE RETINOPATHY AND MACULAR EDEMA, WITH LONG-TERM CURRENT USE OF INSULIN: Primary | ICD-10-CM

## 2019-04-29 DIAGNOSIS — Z79.4 CONTROLLED TYPE 2 DIABETES MELLITUS WITH BOTH EYES AFFECTED BY PROLIFERATIVE RETINOPATHY AND MACULAR EDEMA, WITH LONG-TERM CURRENT USE OF INSULIN: Primary | ICD-10-CM

## 2019-04-29 DIAGNOSIS — H35.012 RETINAL MACROANEURYSM, LEFT EYE: ICD-10-CM

## 2019-04-29 PROCEDURE — 92134 POSTERIOR SEGMENT OCT RETINA (OCULAR COHERENCE TOMOGRAPHY)-BOTH EYES: ICD-10-PCS | Mod: S$GLB,,, | Performed by: OPHTHALMOLOGY

## 2019-04-29 PROCEDURE — 92014 PR EYE EXAM, EST PATIENT,COMPREHESV: ICD-10-PCS | Mod: 25,S$GLB,, | Performed by: OPHTHALMOLOGY

## 2019-04-29 PROCEDURE — 67028 PR INJECT INTRAVITREAL PHARMCOLOGIC: ICD-10-PCS | Mod: LT,S$GLB,, | Performed by: OPHTHALMOLOGY

## 2019-04-29 PROCEDURE — 92134 CPTRZ OPH DX IMG PST SGM RTA: CPT | Mod: S$GLB,,, | Performed by: OPHTHALMOLOGY

## 2019-04-29 PROCEDURE — 92014 COMPRE OPH EXAM EST PT 1/>: CPT | Mod: 25,S$GLB,, | Performed by: OPHTHALMOLOGY

## 2019-04-29 PROCEDURE — 67028 INJECTION EYE DRUG: CPT | Mod: LT,S$GLB,, | Performed by: OPHTHALMOLOGY

## 2019-04-29 RX ADMIN — Medication 1.25 MG: at 12:04

## 2019-04-29 NOTE — PROGRESS NOTES
HPI     Diabetic Eye Exam      Additional comments: 1 mon chk              Comments     Patient states that vision seems about the same as last visit in both   eyes.    Latanoprost QHS OU       HPI     1 month OCT/FA OS  DLS- 02/18/2019 Dr. Herrera     Pt sts vision seems to be stable no change noticed. Denies pain     (-)Flashes (-)Floaters  (-)Photophobia  (-)Glare    Latanoprost QHS OU x 1 year     HPI     Eye Problem      Additional comments: heme OS              Comments     Patient was getting injections OS with Dr Marie until insurance changed. She was last injected in December. She had been getting injections OD also but OD has been stable for a while now. She is also DM which is well controlled per patient    LBS=75 this am  A1C=7. ?(1/19)    Latanoprost qhs OU          Last edited by Digna Castillo on 2/18/2019  2:33 PM. (History)        OCT - OD - no ME  OS - temporal ME a/w EMILE    FA - late macular leakage OS  NO NV OU      A/P    1. PDR OU  S/p PRP OD with PSO'S  ?not high risk OS - will follow up FA    2. DME OS -   A/w EMILE  S/p multiple injections with PSO'S  S/p Avastin OS x 2 with me    Avastin OS today    Will need laser to EMILE next    3. HTN Ret OU  BS/BP/chol control    4. NS OU  No VS      2 weeks Focal OS    Risks, benefits, and alternatives to treatment discussed in detail with the patient.  The patient voiced understanding and wished to proceed with the procedure    Injection Procedure Note:  Diagnosis: DME OS    Patient Identified and Time Out complete  Topical Proparacaine and Betadine.  Inject Avastin OS at 6:00 @ 3.5-4mm posterior to limbus  Post Operative Dx: Same  Complications: None  Follow up as above.

## 2019-04-29 NOTE — PATIENT INSTRUCTIONS

## 2019-05-03 ENCOUNTER — OFFICE VISIT (OUTPATIENT)
Dept: FAMILY MEDICINE | Facility: CLINIC | Age: 59
End: 2019-05-03
Payer: COMMERCIAL

## 2019-05-03 VITALS
RESPIRATION RATE: 18 BRPM | DIASTOLIC BLOOD PRESSURE: 76 MMHG | SYSTOLIC BLOOD PRESSURE: 144 MMHG | TEMPERATURE: 98 F | HEIGHT: 69 IN | OXYGEN SATURATION: 99 % | BODY MASS INDEX: 34.16 KG/M2 | WEIGHT: 230.63 LBS | HEART RATE: 84 BPM

## 2019-05-03 DIAGNOSIS — I15.2 HYPERTENSION ASSOCIATED WITH DIABETES: ICD-10-CM

## 2019-05-03 DIAGNOSIS — E11.59 HYPERTENSION ASSOCIATED WITH DIABETES: ICD-10-CM

## 2019-05-03 DIAGNOSIS — E11.8 TYPE 2 DIABETES MELLITUS WITH COMPLICATION, UNSPECIFIED WHETHER LONG TERM INSULIN USE: Primary | ICD-10-CM

## 2019-05-03 PROCEDURE — 99204 OFFICE O/P NEW MOD 45 MIN: CPT | Mod: S$GLB,,, | Performed by: FAMILY MEDICINE

## 2019-05-03 PROCEDURE — 99204 PR OFFICE/OUTPT VISIT, NEW, LEVL IV, 45-59 MIN: ICD-10-PCS | Mod: S$GLB,,, | Performed by: FAMILY MEDICINE

## 2019-05-03 RX ORDER — DILTIAZEM HYDROCHLORIDE EXTENDED-RELEASE TABLETS 300 MG/1
300 TABLET, EXTENDED RELEASE ORAL DAILY
Qty: 90 TABLET | Refills: 1 | Status: SHIPPED | OUTPATIENT
Start: 2019-05-03 | End: 2019-06-27

## 2019-05-03 NOTE — PROGRESS NOTES
Subjective:       Patient ID: Nohelia Rodriguez is a 59 y.o. female.    Chief Complaint: Establish Care and Medication Refills    HPI   The patient is a 59-year-old who is here today to establish care.  Her insurance recently changed and she has to change to a new PCP.  Today we discussed the followin)  Diabetes.  She has had diabetes for 26 years.  She does have diabetic retinopathy but has no other complications from her diabetes.  She is currently taking Lantus 30 units at night, Glucophage 1000 mg twice a day and Actos 15 mg once a day.  Her fasting sugars range from 50s to 120s with most of her readings being less than 100s.  She does not have any symptoms of hypoglycemia.  Her A1cs have been consistently good.  She is following with her retinal specialist for her retinopathy treatment  2) hypertension.  She is taking Avalide 300 mg/12.5 mg once a day but is out of her Cardizem 300 mg once a day.  She does need a refill of the Cardizem.  She has had blood pressure problems for years but it is usually good with her medications    She does tell me that she has had a stress test which was normal previously    Review of Systems   Constitutional: Negative for appetite change, chills, diaphoresis, fatigue, fever and unexpected weight change.   HENT: Negative for congestion, ear pain, postnasal drip, rhinorrhea, sinus pressure, sneezing, sore throat and trouble swallowing.    Eyes: Negative for pain, discharge and visual disturbance.   Respiratory: Negative for cough, chest tightness, shortness of breath and wheezing.    Cardiovascular: Negative for chest pain, palpitations and leg swelling.   Gastrointestinal: Negative for abdominal distention, abdominal pain, blood in stool, constipation, diarrhea, nausea and vomiting.   Skin: Negative for rash.       Objective:      Physical Exam   Constitutional: She is oriented to person, place, and time. She appears well-developed and well-nourished. No distress.   HENT:    Head: Normocephalic and atraumatic.   Right Ear: Hearing, tympanic membrane, external ear and ear canal normal.   Left Ear: Hearing, tympanic membrane, external ear and ear canal normal.   Nose: Nose normal.   Mouth/Throat: Oropharynx is clear and moist and mucous membranes are normal. No oral lesions. No oropharyngeal exudate, posterior oropharyngeal edema or posterior oropharyngeal erythema.   Eyes: Pupils are equal, round, and reactive to light. Conjunctivae, EOM and lids are normal. No scleral icterus.   Neck: Normal range of motion. Neck supple. Carotid bruit is not present. No thyroid mass and no thyromegaly present.   Cardiovascular: Normal rate, regular rhythm and normal heart sounds.  No extrasystoles are present. PMI is not displaced. Exam reveals no gallop.   No murmur heard.  Pulses:       Dorsalis pedis pulses are 2+ on the right side, and 2+ on the left side.        Posterior tibial pulses are 2+ on the right side, and 2+ on the left side.   Pulmonary/Chest: Effort normal and breath sounds normal. No accessory muscle usage. No respiratory distress.   Clear to auscultation bilaterally.   Abdominal: Soft. Normal appearance and bowel sounds are normal. She exhibits no abdominal bruit. There is no hepatosplenomegaly. There is no tenderness. There is no rebound.   Musculoskeletal:        Right foot: There is no deformity.        Left foot: There is no deformity.   Feet:   Right Foot:   Protective Sensation: 10 sites tested. 10 sites sensed.   Skin Integrity: Positive for warmth. Negative for ulcer or callus.   Left Foot:   Protective Sensation: 10 sites tested. 10 sites sensed.   Skin Integrity: Positive for warmth. Negative for ulcer or callus.   Lymphadenopathy:        Head (right side): No submental and no submandibular adenopathy present.        Head (left side): No submental and no submandibular adenopathy present.        Right cervical: No superficial cervical, no deep cervical and no posterior  "cervical adenopathy present.       Left cervical: No superficial cervical, no deep cervical and no posterior cervical adenopathy present.        Right: No supraclavicular adenopathy present.        Left: No supraclavicular adenopathy present.   Neurological: She is alert and oriented to person, place, and time.   Skin: Skin is warm, dry and intact.   Psychiatric: She has a normal mood and affect.     Blood pressure (!) 144/76, pulse 84, temperature 98 °F (36.7 °C), temperature source Oral, resp. rate 18, height 5' 9" (1.753 m), weight 104.6 kg (230 lb 9.6 oz), SpO2 99 %.Body mass index is 34.05 kg/m².            A/P:  1) diabetes.  New to me.  Status unknown.  We will check an A1c and urine microalbumin.  For now she will continue with her current medications.  We did discuss adding a statin given her risk factors for atherosclerosis and will return to this discussion after she has her labs completed  2) hypertension.  New to me.  We will resume the Cardizem and she will continue with the Avalide.  We will have her come back in 2 weeks for blood pressure check with the nurses   3)  glaucoma.  New to me.  Follow up with eye specialist as planned  4)  Health maintenance issues.  We will check fasting labs soon.  We will request her last Pap smear.        "

## 2019-05-06 ENCOUNTER — TELEPHONE (OUTPATIENT)
Dept: GASTROENTEROLOGY | Facility: CLINIC | Age: 59
End: 2019-05-06

## 2019-05-06 ENCOUNTER — TELEPHONE (OUTPATIENT)
Dept: FAMILY MEDICINE | Facility: CLINIC | Age: 59
End: 2019-05-06

## 2019-05-06 DIAGNOSIS — E78.5 HYPERLIPIDEMIA, UNSPECIFIED HYPERLIPIDEMIA TYPE: Primary | ICD-10-CM

## 2019-05-06 DIAGNOSIS — E83.52 HYPERCALCEMIA: ICD-10-CM

## 2019-05-06 DIAGNOSIS — R79.89 ABNORMAL CBC: ICD-10-CM

## 2019-05-06 RX ORDER — ROSUVASTATIN CALCIUM 10 MG/1
10 TABLET, COATED ORAL DAILY
Qty: 90 TABLET | Refills: 0 | Status: SHIPPED | OUTPATIENT
Start: 2019-05-06 | End: 2019-08-06 | Stop reason: SDUPTHER

## 2019-05-06 NOTE — TELEPHONE ENCOUNTER
----- Message from Kishor Membreno MD sent at 5/3/2019  3:43 PM CDT -----  Tell pt Barium Enema negative (no polyps or tumor). Recommend pt take an OTC laxative today (mag citrate or dulcolax) to clear barium from colon.

## 2019-05-06 NOTE — TELEPHONE ENCOUNTER
Spoke with pt. Informed of results & recommendations per Dr. Membreno. Pt verbalized understanding.

## 2019-05-06 NOTE — TELEPHONE ENCOUNTER
Pls notify pt:    Overall, your labs look good except for the following -  FLP is higher than I'd like and I do recommend a low dose cholesterol med - crestor.  Let's start that and recheck labs in 3 months  Calcium level is high.  Have you seen that before?  Let's recheck the calcium with a PTH level in the next 2 weeks  CBC is abnormal with low WBC and anemia noted.  Let's recheck that in 2 weeks.      DM is doing amazing so keep that up!!

## 2019-05-07 NOTE — TELEPHONE ENCOUNTER
Patient notified of results and cholesterol medication, verbalized understanding. Labs scheduled for repeat in 2 weeks and 3 months, patient aware.

## 2019-05-09 ENCOUNTER — TELEPHONE (OUTPATIENT)
Dept: ADMINISTRATIVE | Facility: HOSPITAL | Age: 59
End: 2019-05-09

## 2019-05-13 ENCOUNTER — OFFICE VISIT (OUTPATIENT)
Dept: OPHTHALMOLOGY | Facility: CLINIC | Age: 59
End: 2019-05-13
Payer: COMMERCIAL

## 2019-05-13 VITALS — DIASTOLIC BLOOD PRESSURE: 95 MMHG | HEART RATE: 101 BPM | SYSTOLIC BLOOD PRESSURE: 189 MMHG

## 2019-05-13 DIAGNOSIS — E11.3513 CONTROLLED TYPE 2 DIABETES MELLITUS WITH BOTH EYES AFFECTED BY PROLIFERATIVE RETINOPATHY AND MACULAR EDEMA, WITH LONG-TERM CURRENT USE OF INSULIN: Primary | ICD-10-CM

## 2019-05-13 DIAGNOSIS — Z79.4 CONTROLLED TYPE 2 DIABETES MELLITUS WITH BOTH EYES AFFECTED BY PROLIFERATIVE RETINOPATHY AND MACULAR EDEMA, WITH LONG-TERM CURRENT USE OF INSULIN: Primary | ICD-10-CM

## 2019-05-13 PROCEDURE — 99499 UNLISTED E&M SERVICE: CPT | Mod: S$GLB,,, | Performed by: OPHTHALMOLOGY

## 2019-05-13 PROCEDURE — 67210 TREATMENT OF RETINAL LESION: CPT | Mod: LT,S$GLB,, | Performed by: OPHTHALMOLOGY

## 2019-05-13 PROCEDURE — 67210 PR DESTRUC RETINAL LESN,PHOTOCOAG: ICD-10-PCS | Mod: LT,S$GLB,, | Performed by: OPHTHALMOLOGY

## 2019-05-13 PROCEDURE — 99999 PR PBB SHADOW E&M-EST. PATIENT-LVL III: ICD-10-PCS | Mod: PBBFAC,,, | Performed by: OPHTHALMOLOGY

## 2019-05-13 PROCEDURE — 99499 NO LOS: ICD-10-PCS | Mod: S$GLB,,, | Performed by: OPHTHALMOLOGY

## 2019-05-13 PROCEDURE — 99999 PR PBB SHADOW E&M-EST. PATIENT-LVL III: CPT | Mod: PBBFAC,,, | Performed by: OPHTHALMOLOGY

## 2019-05-13 RX ORDER — GLIMEPIRIDE 2 MG/1
2 TABLET ORAL
Refills: 5 | COMMUNITY
Start: 2019-05-04 | End: 2019-11-06 | Stop reason: SDUPTHER

## 2019-05-13 NOTE — PROGRESS NOTES
HPI     DLS: 04/29/2019    Patient states that vision seems about the same as last visit in both   eyes.    Latanoprost QHS OU       Prior OCT - OD - no ME  OS - temporal ME a/w EMILE    Prior FA - late macular leakage OS  NO NV OU      A/P    1. PDR OU  S/p PRP OD with PSO'S  ?not high risk OS - will follow up FA    2. DME OS -   A/w EMILE  S/p multiple injections with PSO'S  S/p Avastin OS x 3 with me      Laser to EMILE and MA's    3. HTN Ret OU  BS/BP/chol control    4. NS OU  No VS      3 months OCT    Risks, benefits, and alternatives to treatment discussed in detail with the patient.  The patient voiced understanding and wished to proceed with the procedure    Laser Procedure Note  Dx: DME OS  Laser: Focal OS  Argon  Spot: 100/200  Power:   Dur: 0.1-0.5  #:   115  Complications: None  F/U as above

## 2019-05-25 ENCOUNTER — LAB VISIT (OUTPATIENT)
Dept: LAB | Facility: HOSPITAL | Age: 59
End: 2019-05-25
Attending: FAMILY MEDICINE
Payer: COMMERCIAL

## 2019-05-25 DIAGNOSIS — R79.89 ABNORMAL CBC: ICD-10-CM

## 2019-05-25 DIAGNOSIS — E83.52 HYPERCALCEMIA: ICD-10-CM

## 2019-05-25 LAB
25(OH)D3+25(OH)D2 SERPL-MCNC: 36 NG/ML (ref 30–96)
BASOPHILS # BLD AUTO: 0.04 K/UL (ref 0–0.2)
BASOPHILS NFR BLD: 1 % (ref 0–1.9)
CA-I BLDV-SCNC: 1.3 MMOL/L (ref 1.06–1.42)
DIFFERENTIAL METHOD: ABNORMAL
EOSINOPHIL # BLD AUTO: 0.2 K/UL (ref 0–0.5)
EOSINOPHIL NFR BLD: 4.4 % (ref 0–8)
ERYTHROCYTE [DISTWIDTH] IN BLOOD BY AUTOMATED COUNT: 13.9 % (ref 11.5–14.5)
HCT VFR BLD AUTO: 35.3 % (ref 37–48.5)
HGB BLD-MCNC: 10.8 G/DL (ref 12–16)
IMM GRANULOCYTES # BLD AUTO: 0.01 K/UL (ref 0–0.04)
IMM GRANULOCYTES NFR BLD AUTO: 0.3 % (ref 0–0.5)
LYMPHOCYTES # BLD AUTO: 1.4 K/UL (ref 1–4.8)
LYMPHOCYTES NFR BLD: 36.6 % (ref 18–48)
MCH RBC QN AUTO: 27.2 PG (ref 27–31)
MCHC RBC AUTO-ENTMCNC: 30.6 G/DL (ref 32–36)
MCV RBC AUTO: 89 FL (ref 82–98)
MONOCYTES # BLD AUTO: 0.3 K/UL (ref 0.3–1)
MONOCYTES NFR BLD: 7 % (ref 4–15)
NEUTROPHILS # BLD AUTO: 1.9 K/UL (ref 1.8–7.7)
NEUTROPHILS NFR BLD: 50.7 % (ref 38–73)
NRBC BLD-RTO: 0 /100 WBC
PLATELET # BLD AUTO: 290 K/UL (ref 150–350)
PMV BLD AUTO: 10.8 FL (ref 9.2–12.9)
PTH-INTACT SERPL-MCNC: 53 PG/ML (ref 9–77)
RBC # BLD AUTO: 3.97 M/UL (ref 4–5.4)
WBC # BLD AUTO: 3.83 K/UL (ref 3.9–12.7)

## 2019-05-25 PROCEDURE — 85025 COMPLETE CBC W/AUTO DIFF WBC: CPT

## 2019-05-25 PROCEDURE — 83970 ASSAY OF PARATHORMONE: CPT

## 2019-05-25 PROCEDURE — 82306 VITAMIN D 25 HYDROXY: CPT

## 2019-05-25 PROCEDURE — 82330 ASSAY OF CALCIUM: CPT

## 2019-05-25 PROCEDURE — 36415 COLL VENOUS BLD VENIPUNCTURE: CPT | Mod: PO

## 2019-05-29 ENCOUNTER — TELEPHONE (OUTPATIENT)
Dept: FAMILY MEDICINE | Facility: CLINIC | Age: 59
End: 2019-05-29
Payer: COMMERCIAL

## 2019-05-29 DIAGNOSIS — R79.89 ABNORMAL CBC: ICD-10-CM

## 2019-05-29 DIAGNOSIS — D64.9 ANEMIA, UNSPECIFIED TYPE: Primary | ICD-10-CM

## 2019-05-29 PROCEDURE — 82270 POCT HEMOCULT STOOL X3: ICD-10-PCS | Mod: ,,, | Performed by: FAMILY MEDICINE

## 2019-05-29 PROCEDURE — 82270 OCCULT BLOOD FECES: CPT | Mod: ,,, | Performed by: FAMILY MEDICINE

## 2019-05-29 NOTE — TELEPHONE ENCOUNTER
Pls notify pt:    Repeat Ca levels were normal  Vit D and PTH levels also were normal    CBC is abnormal with low WBC and anemia again noted.    For this, let's rani with hematology.    Let's also do stool cards to look for microscopic blood loss in colon.   Are you having any obvious source of blood loss?    Is there any FH of anemia?

## 2019-05-31 NOTE — TELEPHONE ENCOUNTER
----- Message from Attila Estrada sent at 5/31/2019 10:07 AM CDT -----  Contact: Patient  Type:  Patient Returning Call    Who Called:  Patient  Who Left Message for Patient:  Shereen  Does the patient know what this is regarding?:  Lab results  Best Call Back Number:  [phone]

## 2019-06-04 NOTE — TELEPHONE ENCOUNTER
Pls make 3 more attempts  If still no response, send certified letter that she needs to call us about a message we've been trying to get her for from 5/29

## 2019-06-04 NOTE — TELEPHONE ENCOUNTER
Third attempt to reach patient, no answer, LM for return call. Inactive My Chart, how would you like to proceed?

## 2019-06-05 ENCOUNTER — TELEPHONE (OUTPATIENT)
Dept: HEMATOLOGY/ONCOLOGY | Facility: CLINIC | Age: 59
End: 2019-06-05

## 2019-06-05 ENCOUNTER — TELEPHONE (OUTPATIENT)
Dept: FAMILY MEDICINE | Facility: CLINIC | Age: 59
End: 2019-06-05

## 2019-06-05 NOTE — TELEPHONE ENCOUNTER
Spoke to patient regarding below message, states she has only been anemic during pregnancy, no FH of anemia that she is aware of and no obvious source of blood loss. Patient states she had colonoscopy done 3/2019, results came back negative and she was told to repeat in 10 years. Nurse visit scheduled for stool cards, message sent to Hem/Onc for scheduling.

## 2019-06-06 ENCOUNTER — TELEPHONE (OUTPATIENT)
Dept: HEMATOLOGY/ONCOLOGY | Facility: CLINIC | Age: 59
End: 2019-06-06

## 2019-06-10 ENCOUNTER — TELEPHONE (OUTPATIENT)
Dept: FAMILY MEDICINE | Facility: CLINIC | Age: 59
End: 2019-06-10

## 2019-06-10 NOTE — TELEPHONE ENCOUNTER
----- Message from Julissa García sent at 6/10/2019  8:48 AM CDT -----  Contact: pt  States she needs to cancel her nurse visit today and reschedule. Please call pt at [phone]. Thank you

## 2019-06-11 ENCOUNTER — TELEPHONE (OUTPATIENT)
Dept: FAMILY MEDICINE | Facility: CLINIC | Age: 59
End: 2019-06-11

## 2019-06-11 NOTE — TELEPHONE ENCOUNTER
----- Message from Alessandra Davis sent at 6/11/2019  9:32 AM CDT -----  Contact: Patient  Type: Needs Medical Advice    Who Called:  Patient  Best Call Back Number:   Additional Information: Calling to reschedule her Nurse visit that was cancelled yesterday, 6/10/19. Please advise

## 2019-06-13 ENCOUNTER — TELEPHONE (OUTPATIENT)
Dept: FAMILY MEDICINE | Facility: CLINIC | Age: 59
End: 2019-06-13

## 2019-06-13 ENCOUNTER — CLINICAL SUPPORT (OUTPATIENT)
Dept: FAMILY MEDICINE | Facility: CLINIC | Age: 59
End: 2019-06-13
Payer: COMMERCIAL

## 2019-06-13 DIAGNOSIS — Z12.11 SCREEN FOR COLON CANCER: Primary | ICD-10-CM

## 2019-06-13 NOTE — TELEPHONE ENCOUNTER
Patient in today to get stool cards for collection.  Inquired about her medications during visit and stated that her blood pressure has been running high.  During conversation determined that patient has not been taking the Avalide. Patient has been taking the Diltiazem.  Last use unknown. Patient also ran out of Cardizem a couple of days ago.  Please advise. /94 p85

## 2019-06-13 NOTE — PROGRESS NOTES
Stool cards x 3 provided to patient with instructions. Patient verbalized understanding and will call to make nurse appt when completed.

## 2019-06-14 RX ORDER — IRBESARTAN AND HYDROCHLOROTHIAZIDE 300; 12.5 MG/1; MG/1
1 TABLET, FILM COATED ORAL DAILY
Qty: 30 TABLET | Refills: 1 | Status: SHIPPED | OUTPATIENT
Start: 2019-06-14 | End: 2019-08-29 | Stop reason: SDUPTHER

## 2019-06-14 NOTE — TELEPHONE ENCOUNTER
So she's not been taking either of her BP meds?  If not,  Let's resume avalide and see how that works  Ramana with nurse for BP check in 1 - 2 weeks

## 2019-06-17 NOTE — TELEPHONE ENCOUNTER
Patient states she has been taking diltiazem and has resumed avalide. BP check scheduled, patient aware.

## 2019-06-20 ENCOUNTER — CLINICAL SUPPORT (OUTPATIENT)
Dept: FAMILY MEDICINE | Facility: CLINIC | Age: 59
End: 2019-06-20
Payer: COMMERCIAL

## 2019-06-20 LAB
CTP QC/QA: YES
FECAL OCCULT BLOOD, POC: NEGATIVE

## 2019-06-21 ENCOUNTER — LAB VISIT (OUTPATIENT)
Dept: LAB | Facility: HOSPITAL | Age: 59
End: 2019-06-21
Attending: INTERNAL MEDICINE
Payer: COMMERCIAL

## 2019-06-21 ENCOUNTER — OFFICE VISIT (OUTPATIENT)
Dept: HEMATOLOGY/ONCOLOGY | Facility: CLINIC | Age: 59
End: 2019-06-21
Payer: COMMERCIAL

## 2019-06-21 VITALS
SYSTOLIC BLOOD PRESSURE: 142 MMHG | TEMPERATURE: 98 F | RESPIRATION RATE: 18 BRPM | DIASTOLIC BLOOD PRESSURE: 72 MMHG | BODY MASS INDEX: 33.69 KG/M2 | WEIGHT: 227.5 LBS | HEART RATE: 86 BPM | HEIGHT: 69 IN | OXYGEN SATURATION: 99 %

## 2019-06-21 DIAGNOSIS — D50.0 ANEMIA DUE TO CHRONIC BLOOD LOSS: Primary | ICD-10-CM

## 2019-06-21 DIAGNOSIS — D50.8 IRON DEFICIENCY ANEMIA SECONDARY TO INADEQUATE DIETARY IRON INTAKE: ICD-10-CM

## 2019-06-21 DIAGNOSIS — D50.0 ANEMIA DUE TO CHRONIC BLOOD LOSS: ICD-10-CM

## 2019-06-21 LAB
ALBUMIN SERPL BCP-MCNC: 4.8 G/DL (ref 3.5–5.2)
ALP SERPL-CCNC: 60 U/L (ref 38–145)
ALT SERPL W/O P-5'-P-CCNC: 16 U/L (ref 10–44)
ANION GAP SERPL CALC-SCNC: 11 MMOL/L (ref 8–16)
AST SERPL-CCNC: 27 U/L (ref 14–36)
BASOPHILS # BLD AUTO: 0.03 K/UL (ref 0–0.2)
BASOPHILS NFR BLD: 0.8 % (ref 0–1.9)
BILIRUB SERPL-MCNC: 0.4 MG/DL (ref 0.2–1.3)
BUN SERPL-MCNC: 20 MG/DL (ref 7–18)
CALCIUM SERPL-MCNC: 10.2 MG/DL (ref 8.4–10.2)
CHLORIDE SERPL-SCNC: 99 MMOL/L (ref 95–110)
CO2 SERPL-SCNC: 31 MMOL/L (ref 22–31)
CREAT SERPL-MCNC: 1.06 MG/DL (ref 0.5–1.4)
DIFFERENTIAL METHOD: ABNORMAL
EOSINOPHIL # BLD AUTO: 0.1 K/UL (ref 0–0.5)
EOSINOPHIL NFR BLD: 2.5 % (ref 0–8)
ERYTHROCYTE [DISTWIDTH] IN BLOOD BY AUTOMATED COUNT: 13.9 % (ref 11.5–14.5)
EST. GFR  (AFRICAN AMERICAN): >60 ML/MIN/1.73 M^2
EST. GFR  (NON AFRICAN AMERICAN): 58 ML/MIN/1.73 M^2
FERRITIN SERPL-MCNC: 21 NG/ML (ref 12–264)
FOLATE SERPL-MCNC: >20 NG/ML (ref 4–24)
GLUCOSE SERPL-MCNC: 79 MG/DL (ref 70–110)
HCT VFR BLD AUTO: 34 % (ref 37–48.5)
HGB BLD-MCNC: 10.9 G/DL (ref 12–16)
IMM GRANULOCYTES # BLD AUTO: 0.01 K/UL (ref 0–0.04)
IMM GRANULOCYTES NFR BLD AUTO: 0.3 % (ref 0–0.5)
IRON SATN MFR SERPL: 16 % (ref 20–50)
IRON SERPL-MCNC: 65 UG/DL (ref 30–160)
LYMPHOCYTES # BLD AUTO: 1.7 K/UL (ref 1–4.8)
LYMPHOCYTES NFR BLD: 42.6 % (ref 18–48)
MCH RBC QN AUTO: 27.8 PG (ref 27–31)
MCHC RBC AUTO-ENTMCNC: 32.1 G/DL (ref 32–36)
MCV RBC AUTO: 87 FL (ref 82–98)
MONOCYTES # BLD AUTO: 0.4 K/UL (ref 0.3–1)
MONOCYTES NFR BLD: 10.1 % (ref 4–15)
NEUTROPHILS # BLD AUTO: 1.7 K/UL (ref 1.8–7.7)
NEUTROPHILS NFR BLD: 43.7 % (ref 38–73)
NRBC BLD-RTO: 0 /100 WBC
PLATELET # BLD AUTO: 288 K/UL (ref 150–350)
PMV BLD AUTO: 10.2 FL (ref 9.2–12.9)
POTASSIUM SERPL-SCNC: 3.8 MMOL/L (ref 3.5–5.1)
PROT SERPL-MCNC: 8.4 G/DL (ref 6–8.4)
RBC # BLD AUTO: 3.92 M/UL (ref 4–5.4)
SODIUM SERPL-SCNC: 141 MMOL/L (ref 136–145)
TOTAL IRON BINDING CAPACITY: 416 UG/DL (ref 265–497)
VIT B12 SERPL-MCNC: 314 PG/ML (ref 210–950)
WBC # BLD AUTO: 3.97 K/UL (ref 3.9–12.7)

## 2019-06-21 PROCEDURE — 82607 VITAMIN B-12: CPT | Mod: PN

## 2019-06-21 PROCEDURE — 80053 COMPREHEN METABOLIC PANEL: CPT | Mod: PN

## 2019-06-21 PROCEDURE — 99204 OFFICE O/P NEW MOD 45 MIN: CPT | Mod: S$GLB,,, | Performed by: INTERNAL MEDICINE

## 2019-06-21 PROCEDURE — 82746 ASSAY OF FOLIC ACID SERUM: CPT

## 2019-06-21 PROCEDURE — 80053 COMPREHEN METABOLIC PANEL: CPT

## 2019-06-21 PROCEDURE — 82728 ASSAY OF FERRITIN: CPT

## 2019-06-21 PROCEDURE — 83540 ASSAY OF IRON: CPT

## 2019-06-21 PROCEDURE — 82746 ASSAY OF FOLIC ACID SERUM: CPT | Mod: PN

## 2019-06-21 PROCEDURE — 85025 COMPLETE CBC W/AUTO DIFF WBC: CPT | Mod: PN

## 2019-06-21 PROCEDURE — 99999 PR PBB SHADOW E&M-EST. PATIENT-LVL III: CPT | Mod: PBBFAC,,, | Performed by: INTERNAL MEDICINE

## 2019-06-21 PROCEDURE — 85025 COMPLETE CBC W/AUTO DIFF WBC: CPT

## 2019-06-21 PROCEDURE — 99204 PR OFFICE/OUTPT VISIT, NEW, LEVL IV, 45-59 MIN: ICD-10-PCS | Mod: S$GLB,,, | Performed by: INTERNAL MEDICINE

## 2019-06-21 PROCEDURE — 36415 COLL VENOUS BLD VENIPUNCTURE: CPT | Mod: PN

## 2019-06-21 PROCEDURE — 82728 ASSAY OF FERRITIN: CPT | Mod: PN

## 2019-06-21 PROCEDURE — 82607 VITAMIN B-12: CPT

## 2019-06-21 PROCEDURE — 99999 PR PBB SHADOW E&M-EST. PATIENT-LVL III: ICD-10-PCS | Mod: PBBFAC,,, | Performed by: INTERNAL MEDICINE

## 2019-06-21 PROCEDURE — 83540 ASSAY OF IRON: CPT | Mod: PN

## 2019-06-21 NOTE — LETTER
June 21, 2019      Katelyn Barrett MD  [id number] [address] [address]           [facility] [address] [place] [address]  Phone: [phone]  Fax: [phone]          Patient: Nohelia Rodriguez   MR Number: [id number]   YOB: 1960   Date of Visit: 6/21/2019       Dear Dr. Katelyn Barrett:    Thank you for referring Nohelia Rodriguez to me for evaluation. Attached you will find relevant portions of my assessment and plan of care.    If you have questions, please do not hesitate to call me. I look forward to following Nohelia Rodriguez along with you.    Sincerely,    Vinayak Riley MD    Enclosure  CC:  No Recipients    If you would like to receive this communication electronically, please contact [email or web address] or [phone] to request more information on [employer] access.    For providers and/or their staff who would like to refer a patient to [facility], please contact us through our one-stop-shop provider referral line, [facility], at [phone].    If you feel you have received this communication in error or would no longer like to receive these types of communications, please e-mail [email or web address]

## 2019-06-21 NOTE — PROGRESS NOTES
HPI    59 years old  female presented to [facility] for evaluation of anemia.  Patient had a stool occult blood x3 normal. On 05/25/2019 patient had a WBC of 3.83, hemoglobin 10.8 grams/deciliter, platelets of 290 K. stool call is negative x3. No additional workup has been done for this patient.    Denies chest pain shortness breath.  Denies abdominal pain nausea vomiting diarrhea.  Denies fever or chills.  Negative family history for blood disorders.    Fourteen point review system negative as above.      Past Medical History:   Diagnosis Date    Diabetes mellitus     Diabetic retinopathy     s/p laser treatment    Glaucoma     Hypertension     S/P colonoscopy     3/19 next due 3/29     Social History     Socioeconomic History    Marital status:      Spouse name: Not on file    Number of children: Not on file    Years of education: Not on file    Highest education level: Not on file   Occupational History    Not on file   Social Needs    Financial resource strain: Not on file    Food insecurity:     Worry: Not on file     Inability: Not on file    Transportation needs:     Medical: Not on file     Non-medical: Not on file   Tobacco Use    Smoking status: Never Smoker    Smokeless tobacco: Never Used   Substance and Sexual Activity    Alcohol use: No     Frequency: Never    Drug use: No    Sexual activity: Not Currently   Lifestyle    Physical activity:     Days per week: Not on file     Minutes per session: Not on file    Stress: Not on file   Relationships    Social connections:     Talks on phone: Not on file     Gets together: Not on file     Attends Protestant service: Not on file     Active member of club or organization: Not on file     Attends meetings of clubs or organizations: Not on file     Relationship status: Not on file   Other Topics Concern    Not on file   Social History Narrative    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          Subjective      Review of Systems   Constitutional: Negative for appetite change, fatigue and unexpected weight change.   HENT: Negative for mouth sores.   Eyes: Negative for visual disturbance.   Respiratory: Negative for cough and shortness of breath.   Cardiovascular: Negative for chest pain.   Gastrointestinal: Negative for diarrhea.   Genitourinary: Negative for frequency.   Musculoskeletal: Negative for back pain.   Skin: Negative for rash.   Neurological: Negative for headaches.   Hematological: Negative for adenopathy.   Psychiatric/Behavioral: The patient is not nervous/anxious.   All other systems reviewed and are negative.     Objective    Physical Exam   Vitals:    06/21/19 1406   BP: (!) 142/72   Pulse: 86   Resp: 18   Temp: 98.3 °F (36.8 °C)       Constitutional:  Alert oriented x3 no acute distress.   HENT:  Normocephalic atraumatic pupils equal round reactive to light extraocular muscle intact.  Cardiovascular:  Normal rate  Pulmonary/Chest:  Clear to auscultate bilaterally   Abdominal: Soft. Normal appearance and bowel sounds are normal. patient exhibits no distension and no mass. There is no hepatosplenomegaly. There is no tenderness.   Musculoskeletal: Normal range of motion. Gait is normal No clubbing, cyanosis     Lymphadenopathy:  Negative for lymphadenopathy  Neurological: patient is alert and oriented to person, place, and time. patient has normal strength and normal reflexes. No sensory deficit. Gait normal.   Skin:  Warm and dry   Psychiatric:  Normal affect  Vitals reviewed.     CMP  Sodium   Date Value Ref Range Status   05/03/2019 139 136 - 145 mmol/L Final     Potassium   Date Value Ref Range Status   05/03/2019 4.4 3.5 - 5.1 mmol/L Final     Chloride   Date Value Ref Range Status   05/03/2019 99 95 - 110 mmol/L Final     CO2   Date Value Ref Range Status   05/03/2019 29 22 - 31 mmol/L Final     Glucose   Date Value Ref Range Status   05/03/2019 62 (L) 70 - 110 mg/dL Final      Comment:     The ADA recommends the following guidelines for fasting glucose:  Normal:       less than 100 mg/dL  Prediabetes:  100 mg/dL to 125 mg/dL  Diabetes:     126 mg/dL or higher       BUN, Bld   Date Value Ref Range Status   05/03/2019 14 7 - 18 mg/dL Final     Creatinine   Date Value Ref Range Status   05/03/2019 1.00 0.50 - 1.40 mg/dL Final     Calcium   Date Value Ref Range Status   05/03/2019 10.8 (H) 8.4 - 10.2 mg/dL Final     Total Protein   Date Value Ref Range Status   05/03/2019 7.9 6.0 - 8.4 g/dL Final     Albumin   Date Value Ref Range Status   05/03/2019 4.7 3.5 - 5.2 g/dL Final     Total Bilirubin   Date Value Ref Range Status   05/03/2019 0.4 0.2 - 1.3 mg/dL Final     Alkaline Phosphatase   Date Value Ref Range Status   05/03/2019 77 38 - 145 U/L Final     AST   Date Value Ref Range Status   05/03/2019 26 14 - 36 U/L Final     ALT   Date Value Ref Range Status   05/03/2019 18 10 - 44 U/L Final     Anion Gap   Date Value Ref Range Status   05/03/2019 11 8 - 16 mmol/L Final     eGFR if    Date Value Ref Range Status   05/03/2019 >60 >60 mL/min/1.73 m^2 Final     eGFR if non    Date Value Ref Range Status   05/03/2019 >60 >60 mL/min/1.73 m^2 Final     Comment:     Calculation used to obtain the estimated glomerular filtration  rate (eGFR) is the CKD-EPI equation.        Lab Results   Component Value Date    WBC 3.83 (L) 05/25/2019    HGB 10.8 (L) 05/25/2019    HCT 35.3 (L) 05/25/2019    MCV 89 05/25/2019     05/25/2019         Assessment    Anemia    Negative stool occult blood      Plan    Check CBC CMP.  Iron panel ferritin  B12 folate  Addition blood work pending above results    RTC after July 4th with above results      There are no diagnoses linked to this encounter.

## 2019-06-27 ENCOUNTER — CLINICAL SUPPORT (OUTPATIENT)
Dept: FAMILY MEDICINE | Facility: CLINIC | Age: 59
End: 2019-06-27
Payer: COMMERCIAL

## 2019-06-27 ENCOUNTER — TELEPHONE (OUTPATIENT)
Dept: FAMILY MEDICINE | Facility: CLINIC | Age: 59
End: 2019-06-27

## 2019-06-27 VITALS — SYSTOLIC BLOOD PRESSURE: 140 MMHG | DIASTOLIC BLOOD PRESSURE: 70 MMHG

## 2019-06-27 DIAGNOSIS — I15.2 HYPERTENSION ASSOCIATED WITH DIABETES: Primary | ICD-10-CM

## 2019-06-27 DIAGNOSIS — E11.59 HYPERTENSION ASSOCIATED WITH DIABETES: Primary | ICD-10-CM

## 2019-06-27 RX ORDER — DILTIAZEM HYDROCHLORIDE EXTENDED-RELEASE TABLETS 360 MG/1
360 TABLET, EXTENDED RELEASE ORAL DAILY
Qty: 30 TABLET | Refills: 1 | Status: SHIPPED | OUTPATIENT
Start: 2019-06-27 | End: 2019-09-12 | Stop reason: SDUPTHER

## 2019-06-27 NOTE — TELEPHONE ENCOUNTER
BP check 138/72    Pt was stressed from work, recheck 140/70  I told her pap every 3 years, does she need it more often than that?

## 2019-06-27 NOTE — PROGRESS NOTES
Nohelia Rodriguez 59 y.o. female is here today for Blood Pressure check.   History of HTN yes.    Review of patient's allergies indicates:  No Known Allergies  Creatinine   Date Value Ref Range Status   06/21/2019 1.06 0.50 - 1.40 mg/dL Final     Sodium   Date Value Ref Range Status   06/21/2019 141 136 - 145 mmol/L Final     Potassium   Date Value Ref Range Status   06/21/2019 3.8 3.5 - 5.1 mmol/L Final   ]  Patient verifies taking blood pressure medications on a regular basis at the same time of the day.     Current Outpatient Medications:     diltiaZEM (CARDIZEM LA) 300 mg 24 hr tablet, Take 1 tablet (300 mg total) by mouth once daily., Disp: 90 tablet, Rfl: 1    glimepiride (AMARYL) 2 MG tablet, Take 2 mg by mouth daily with breakfast. , Disp: , Rfl: 5    irbesartan-hydrochlorothiazide (AVALIDE) 300-12.5 mg per tablet, Take 1 tablet by mouth once daily., Disp: 30 tablet, Rfl: 1    LANTUS SOLOSTAR U-100 INSULIN glargine 100 units/mL (3mL) SubQ pen, , Disp: , Rfl:     latanoprost 0.005 % ophthalmic solution, , Disp: , Rfl:     metFORMIN (GLUCOPHAGE) 1000 MG tablet, , Disp: , Rfl:     rosuvastatin (CRESTOR) 10 MG tablet, Take 1 tablet (10 mg total) by mouth once daily., Disp: 90 tablet, Rfl: 0  Does patient have record of home blood pressure readings no. .   Last dose of blood pressure medication was taken at 0530.  Patient is asymptomatic.   Complains of dust headaches.      ,   .    Blood pressure  was 138/74  After 15 minutes 140/70  Dr. Agudelo notified.

## 2019-06-28 NOTE — TELEPHONE ENCOUNTER
Pls notify pt:    Bps are higher than I'd like  Let's increase the strength of her cardizem   Let's rani nurse BP check in 2 weeks    Hemoccult cards are negative with no microscopic blood noted in the stool  Fu for anemia with hematology as planned

## 2019-06-28 NOTE — TELEPHONE ENCOUNTER
Patient notified of provider message and medication change, verbalized understanding. Patient notified of results and will see hem/onc on 7/19/19. Repeat BP check scheduled, patient aware of date/time.

## 2019-07-19 ENCOUNTER — OFFICE VISIT (OUTPATIENT)
Dept: HEMATOLOGY/ONCOLOGY | Facility: CLINIC | Age: 59
End: 2019-07-19
Payer: COMMERCIAL

## 2019-07-19 VITALS
HEIGHT: 69 IN | WEIGHT: 225.75 LBS | OXYGEN SATURATION: 97 % | SYSTOLIC BLOOD PRESSURE: 147 MMHG | BODY MASS INDEX: 33.44 KG/M2 | DIASTOLIC BLOOD PRESSURE: 81 MMHG | HEART RATE: 98 BPM | RESPIRATION RATE: 18 BRPM | TEMPERATURE: 98 F

## 2019-07-19 DIAGNOSIS — D50.8 IRON DEFICIENCY ANEMIA SECONDARY TO INADEQUATE DIETARY IRON INTAKE: Primary | ICD-10-CM

## 2019-07-19 DIAGNOSIS — D64.9 ANEMIA, UNSPECIFIED TYPE: ICD-10-CM

## 2019-07-19 PROCEDURE — 99214 OFFICE O/P EST MOD 30 MIN: CPT | Mod: S$GLB,,, | Performed by: INTERNAL MEDICINE

## 2019-07-19 PROCEDURE — 99999 PR PBB SHADOW E&M-EST. PATIENT-LVL III: ICD-10-PCS | Mod: PBBFAC,,, | Performed by: INTERNAL MEDICINE

## 2019-07-19 PROCEDURE — 99214 PR OFFICE/OUTPT VISIT, EST, LEVL IV, 30-39 MIN: ICD-10-PCS | Mod: S$GLB,,, | Performed by: INTERNAL MEDICINE

## 2019-07-19 PROCEDURE — 99999 PR PBB SHADOW E&M-EST. PATIENT-LVL III: CPT | Mod: PBBFAC,,, | Performed by: INTERNAL MEDICINE

## 2019-07-19 NOTE — PROGRESS NOTES
HPI    59 years old  female presented to [facility] for evaluation of anemia.  Patient had a stool occult blood x3 normal. On 05/25/2019 patient had a WBC of 3.83, hemoglobin 10.8 grams/deciliter, platelets of 290 K. stool call is negative x3. No additional workup has been done for this patient.    07/19/2019> clinic follow-up:  Denies chest pain shortness breath.  Denies abdominal pain nausea vomiting diarrhea.  Denies fever or chills.  Negative family history for blood disorders.    Fourteen point review system negative as above.      Past Medical History:   Diagnosis Date    Diabetes mellitus     Diabetic retinopathy     s/p laser treatment    Glaucoma     Hypertension     S/P colonoscopy     3/19 next due 3/29     Social History     Socioeconomic History    Marital status:      Spouse name: Not on file    Number of children: Not on file    Years of education: Not on file    Highest education level: Not on file   Occupational History    Not on file   Social Needs    Financial resource strain: Not on file    Food insecurity:     Worry: Not on file     Inability: Not on file    Transportation needs:     Medical: Not on file     Non-medical: Not on file   Tobacco Use    Smoking status: Never Smoker    Smokeless tobacco: Never Used   Substance and Sexual Activity    Alcohol use: No     Frequency: Never    Drug use: No    Sexual activity: Not Currently   Lifestyle    Physical activity:     Days per week: Not on file     Minutes per session: Not on file    Stress: Not on file   Relationships    Social connections:     Talks on phone: Not on file     Gets together: Not on file     Attends Adventism service: Not on file     Active member of club or organization: Not on file     Attends meetings of clubs or organizations: Not on file     Relationship status: Not on file   Other Topics Concern    Not on file   Social History Narrative    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       Objective    Physical Exam   Vitals:    07/19/19 1459   BP: (!) 147/81   Pulse: 98   Resp: 18   Temp: 97.8 °F (36.6 °C)       Constitutional:  Alert oriented x3 no acute distress.   HENT:  Normocephalic atraumatic pupils equal round reactive to light extraocular muscle intact.  Cardiovascular:  Normal rate  Pulmonary/Chest:  Clear to auscultate bilaterally   Abdominal: Soft. Normal appearance and bowel sounds are normal. patient exhibits no distension and no mass. There is no hepatosplenomegaly. There is no tenderness.   Musculoskeletal: Normal range of motion. Gait is normal No clubbing, cyanosis     Lymphadenopathy:  Negative for lymphadenopathy  Neurological: patient is alert and oriented to person, place, and time. patient has normal strength and normal reflexes. No sensory deficit. Gait normal.   Skin:  Warm and dry   Psychiatric:  Normal affect  Vitals reviewed.     CMP  Sodium   Date Value Ref Range Status   06/21/2019 141 136 - 145 mmol/L Final     Potassium   Date Value Ref Range Status   06/21/2019 3.8 3.5 - 5.1 mmol/L Final     Chloride   Date Value Ref Range Status   06/21/2019 99 95 - 110 mmol/L Final     CO2   Date Value Ref Range Status   06/21/2019 31 22 - 31 mmol/L Final     Glucose   Date Value Ref Range Status   06/21/2019 79 70 - 110 mg/dL Final     Comment:     The ADA recommends the following guidelines for fasting glucose:  Normal:       less than 100 mg/dL  Prediabetes:  100 mg/dL to 125 mg/dL  Diabetes:     126 mg/dL or higher       BUN, Bld   Date Value Ref Range Status   06/21/2019 20 (H) 7 - 18 mg/dL Final     Creatinine   Date Value Ref Range Status   06/21/2019 1.06 0.50 - 1.40 mg/dL Final     Calcium   Date Value Ref Range Status   06/21/2019 10.2 8.4 - 10.2 mg/dL Final     Total Protein   Date Value Ref Range Status   06/21/2019 8.4 6.0 - 8.4 g/dL Final     Albumin   Date Value Ref Range Status   06/21/2019 4.8 3.5 - 5.2 g/dL Final     Total Bilirubin    Date Value Ref Range Status   06/21/2019 0.4 0.2 - 1.3 mg/dL Final     Alkaline Phosphatase   Date Value Ref Range Status   06/21/2019 60 38 - 145 U/L Final     AST   Date Value Ref Range Status   06/21/2019 27 14 - 36 U/L Final     ALT   Date Value Ref Range Status   06/21/2019 16 10 - 44 U/L Final     Anion Gap   Date Value Ref Range Status   06/21/2019 11 8 - 16 mmol/L Final     eGFR if    Date Value Ref Range Status   06/21/2019 >60 >60 mL/min/1.73 m^2 Final     eGFR if non    Date Value Ref Range Status   06/21/2019 58 (A) >60 mL/min/1.73 m^2 Final     Comment:     Calculation used to obtain the estimated glomerular filtration  rate (eGFR) is the CKD-EPI equation.        Lab Results   Component Value Date    WBC 3.97 06/21/2019    HGB 10.9 (L) 06/21/2019    HCT 34.0 (L) 06/21/2019    MCV 87 06/21/2019     06/21/2019     Iron panel 06/21/2019  Ferritin 21  Iron 65, TIBC is 416, iron saturation 16%  Folate greater than 20  B12 314      Assessment    Anemia normocytic.   06/21/2019 hemoglobin 10.9 grams/deciliter, WBC 3.97, platelets 280 K.    Negative stool occult blood    Iron deficiency    Plan    Paraprotein workup    Repeat iron studies    Peripheral blood smear    Continue oral iron supplements    RTC end of August follow-up with above studies

## 2019-08-06 RX ORDER — ROSUVASTATIN CALCIUM 10 MG/1
TABLET, COATED ORAL
Qty: 30 TABLET | Refills: 0 | Status: SHIPPED | OUTPATIENT
Start: 2019-08-06 | End: 2019-08-13 | Stop reason: SDUPTHER

## 2019-08-10 ENCOUNTER — LAB VISIT (OUTPATIENT)
Dept: LAB | Facility: HOSPITAL | Age: 59
End: 2019-08-10
Attending: FAMILY MEDICINE
Payer: COMMERCIAL

## 2019-08-10 DIAGNOSIS — E78.5 HYPERLIPIDEMIA, UNSPECIFIED HYPERLIPIDEMIA TYPE: ICD-10-CM

## 2019-08-10 LAB
CHOLEST SERPL-MCNC: 140 MG/DL (ref 120–199)
CHOLEST/HDLC SERPL: 2.7 {RATIO} (ref 2–5)
HDLC SERPL-MCNC: 52 MG/DL (ref 40–75)
HDLC SERPL: 37.1 % (ref 20–50)
LDLC SERPL CALC-MCNC: 75.2 MG/DL (ref 63–159)
NONHDLC SERPL-MCNC: 88 MG/DL
TRIGL SERPL-MCNC: 64 MG/DL (ref 30–150)

## 2019-08-10 PROCEDURE — 80061 LIPID PANEL: CPT

## 2019-08-10 PROCEDURE — 36415 COLL VENOUS BLD VENIPUNCTURE: CPT | Mod: PO

## 2019-08-13 ENCOUNTER — TELEPHONE (OUTPATIENT)
Dept: FAMILY MEDICINE | Facility: CLINIC | Age: 59
End: 2019-08-13

## 2019-08-13 RX ORDER — ROSUVASTATIN CALCIUM 10 MG/1
10 TABLET, COATED ORAL DAILY
Qty: 90 TABLET | Refills: 3 | Status: SHIPPED | OUTPATIENT
Start: 2019-08-13 | End: 2020-09-24

## 2019-08-13 NOTE — TELEPHONE ENCOUNTER
Pls notify pt:    FlP looks great - down ~ 50 - 70 points!  Let's continue with the crestor and recheck this lab in 1 year

## 2019-08-13 NOTE — TELEPHONE ENCOUNTER
----- Message from Yajaira Roberson sent at 8/13/2019  3:30 PM CDT -----  Type:  Patient Returning Call    Who Called:  Patient   Who Left Message for Patient:  Shereen Orlando  Does the patient know what this is regarding?:    Best Call Back Number:  Pt hung up before phone number could be verified.  Additional Information:  Please contact patient after 4

## 2019-08-19 ENCOUNTER — OFFICE VISIT (OUTPATIENT)
Dept: OPHTHALMOLOGY | Facility: CLINIC | Age: 59
End: 2019-08-19
Payer: COMMERCIAL

## 2019-08-19 VITALS — DIASTOLIC BLOOD PRESSURE: 81 MMHG | HEART RATE: 101 BPM | SYSTOLIC BLOOD PRESSURE: 147 MMHG

## 2019-08-19 DIAGNOSIS — H35.012 RETINAL MACROANEURYSM, LEFT EYE: ICD-10-CM

## 2019-08-19 DIAGNOSIS — Z79.4 CONTROLLED TYPE 2 DIABETES MELLITUS WITH BOTH EYES AFFECTED BY PROLIFERATIVE RETINOPATHY AND MACULAR EDEMA, WITH LONG-TERM CURRENT USE OF INSULIN: Primary | ICD-10-CM

## 2019-08-19 DIAGNOSIS — E11.3513 CONTROLLED TYPE 2 DIABETES MELLITUS WITH BOTH EYES AFFECTED BY PROLIFERATIVE RETINOPATHY AND MACULAR EDEMA, WITH LONG-TERM CURRENT USE OF INSULIN: Primary | ICD-10-CM

## 2019-08-19 PROCEDURE — 99999 PR PBB SHADOW E&M-EST. PATIENT-LVL III: ICD-10-PCS | Mod: PBBFAC,,, | Performed by: OPHTHALMOLOGY

## 2019-08-19 PROCEDURE — 92226 PR SPECIAL EYE EXAM, SUBSEQUENT: ICD-10-PCS | Mod: LT,S$GLB,, | Performed by: OPHTHALMOLOGY

## 2019-08-19 PROCEDURE — 99999 PR PBB SHADOW E&M-EST. PATIENT-LVL III: CPT | Mod: PBBFAC,,, | Performed by: OPHTHALMOLOGY

## 2019-08-19 PROCEDURE — 92014 PR EYE EXAM, EST PATIENT,COMPREHESV: ICD-10-PCS | Mod: S$GLB,,, | Performed by: OPHTHALMOLOGY

## 2019-08-19 PROCEDURE — 92014 COMPRE OPH EXAM EST PT 1/>: CPT | Mod: S$GLB,,, | Performed by: OPHTHALMOLOGY

## 2019-08-19 PROCEDURE — 92134 CPTRZ OPH DX IMG PST SGM RTA: CPT | Mod: S$GLB,,, | Performed by: OPHTHALMOLOGY

## 2019-08-19 PROCEDURE — 92134 POSTERIOR SEGMENT OCT RETINA (OCULAR COHERENCE TOMOGRAPHY)-BOTH EYES: ICD-10-PCS | Mod: S$GLB,,, | Performed by: OPHTHALMOLOGY

## 2019-08-19 PROCEDURE — 92226 PR SPECIAL EYE EXAM, SUBSEQUENT: CPT | Mod: LT,S$GLB,, | Performed by: OPHTHALMOLOGY

## 2019-08-19 NOTE — PROGRESS NOTES
HPI     DLS: 05/13/2019 Dr. Herrera    Patient states that vision seems about the same as last visit in both   eyes.    Latanoprost QHS OU       HPI     DLS: 04/29/2019    Patient states that vision seems about the same as last visit in both   eyes.    Latanoprost QHS OU       Prior OCT - OD - no ME  OS - temporal ME a/w EMILE    Prior FA - late macular leakage OS  NO NV OU      A/P    1. PDR OU  S/p PRP OD with PSO'S  ?not high risk OS - will follow up FA    2. DME OS -   A/w EMILE  S/p multiple injections with PSO'S  S/p Avastin OS x 3 with me  S/p Focal OS 5/18    Improving nicely    3. HTN Ret OU  BS/BP/chol control    4. NS OU  No VS      6 months OCT

## 2019-08-24 ENCOUNTER — LAB VISIT (OUTPATIENT)
Dept: LAB | Facility: HOSPITAL | Age: 59
End: 2019-08-24
Attending: FAMILY MEDICINE
Payer: COMMERCIAL

## 2019-08-24 DIAGNOSIS — D50.8 IRON DEFICIENCY ANEMIA SECONDARY TO INADEQUATE DIETARY IRON INTAKE: ICD-10-CM

## 2019-08-24 DIAGNOSIS — D64.9 ANEMIA, UNSPECIFIED TYPE: ICD-10-CM

## 2019-08-24 LAB
ALBUMIN SERPL BCP-MCNC: 4 G/DL (ref 3.5–5.2)
ALP SERPL-CCNC: 61 U/L (ref 55–135)
ALT SERPL W/O P-5'-P-CCNC: 15 U/L (ref 10–44)
ANION GAP SERPL CALC-SCNC: 11 MMOL/L (ref 8–16)
AST SERPL-CCNC: 14 U/L (ref 10–40)
BASOPHILS # BLD AUTO: 0.04 K/UL (ref 0–0.2)
BASOPHILS NFR BLD: 1.2 % (ref 0–1.9)
BILIRUB SERPL-MCNC: 0.3 MG/DL (ref 0.1–1)
BUN SERPL-MCNC: 25 MG/DL (ref 6–20)
CALCIUM SERPL-MCNC: 9.9 MG/DL (ref 8.7–10.5)
CHLORIDE SERPL-SCNC: 103 MMOL/L (ref 95–110)
CO2 SERPL-SCNC: 25 MMOL/L (ref 23–29)
CREAT SERPL-MCNC: 1.3 MG/DL (ref 0.5–1.4)
DIFFERENTIAL METHOD: ABNORMAL
EOSINOPHIL # BLD AUTO: 0.1 K/UL (ref 0–0.5)
EOSINOPHIL NFR BLD: 2.7 % (ref 0–8)
ERYTHROCYTE [DISTWIDTH] IN BLOOD BY AUTOMATED COUNT: 14.6 % (ref 11.5–14.5)
EST. GFR  (AFRICAN AMERICAN): 52 ML/MIN/1.73 M^2
EST. GFR  (NON AFRICAN AMERICAN): 45 ML/MIN/1.73 M^2
FERRITIN SERPL-MCNC: 45 NG/ML (ref 20–300)
GLUCOSE SERPL-MCNC: 168 MG/DL (ref 70–110)
HCT VFR BLD AUTO: 32.2 % (ref 37–48.5)
HGB BLD-MCNC: 10.4 G/DL (ref 12–16)
IRON SERPL-MCNC: 61 UG/DL (ref 30–160)
LYMPHOCYTES # BLD AUTO: 1.5 K/UL (ref 1–4.8)
LYMPHOCYTES NFR BLD: 45.1 % (ref 18–48)
MCH RBC QN AUTO: 27.6 PG (ref 27–31)
MCHC RBC AUTO-ENTMCNC: 32.3 G/DL (ref 32–36)
MCV RBC AUTO: 85 FL (ref 82–98)
MONOCYTES # BLD AUTO: 0.2 K/UL (ref 0.3–1)
MONOCYTES NFR BLD: 6.9 % (ref 4–15)
NEUTROPHILS # BLD AUTO: 1.5 K/UL (ref 1.8–7.7)
NEUTROPHILS NFR BLD: 44.1 % (ref 38–73)
PLATELET # BLD AUTO: 282 K/UL (ref 150–350)
PMV BLD AUTO: 9.9 FL (ref 9.2–12.9)
POTASSIUM SERPL-SCNC: 4.3 MMOL/L (ref 3.5–5.1)
PROT SERPL-MCNC: 7.6 G/DL (ref 6–8.4)
RBC # BLD AUTO: 3.77 M/UL (ref 4–5.4)
SATURATED IRON: 15 % (ref 20–50)
SODIUM SERPL-SCNC: 139 MMOL/L (ref 136–145)
TOTAL IRON BINDING CAPACITY: 419 UG/DL (ref 250–450)
TRANSFERRIN SERPL-MCNC: 283 MG/DL (ref 200–375)
WBC # BLD AUTO: 3.35 K/UL (ref 3.9–12.7)

## 2019-08-24 PROCEDURE — 85025 COMPLETE CBC W/AUTO DIFF WBC: CPT | Mod: PO

## 2019-08-24 PROCEDURE — 86334 PATHOLOGIST INTERPRETATION IFE: ICD-10-PCS | Mod: 26,,, | Performed by: PATHOLOGY

## 2019-08-24 PROCEDURE — 86334 IMMUNOFIX E-PHORESIS SERUM: CPT

## 2019-08-24 PROCEDURE — 83520 IMMUNOASSAY QUANT NOS NONAB: CPT

## 2019-08-24 PROCEDURE — 84165 PROTEIN E-PHORESIS SERUM: CPT | Mod: 26,,, | Performed by: PATHOLOGY

## 2019-08-24 PROCEDURE — 84165 PATHOLOGIST INTERPRETATION SPE: ICD-10-PCS | Mod: 26,,, | Performed by: PATHOLOGY

## 2019-08-24 PROCEDURE — 85060 BLOOD SMEAR INTERPRETATION: CPT | Mod: ,,, | Performed by: PATHOLOGY

## 2019-08-24 PROCEDURE — 80053 COMPREHEN METABOLIC PANEL: CPT | Mod: PO

## 2019-08-24 PROCEDURE — 82728 ASSAY OF FERRITIN: CPT

## 2019-08-24 PROCEDURE — 85060 PATHOLOGIST REVIEW: ICD-10-PCS | Mod: ,,, | Performed by: PATHOLOGY

## 2019-08-24 PROCEDURE — 36415 COLL VENOUS BLD VENIPUNCTURE: CPT | Mod: PO

## 2019-08-24 PROCEDURE — 83540 ASSAY OF IRON: CPT

## 2019-08-24 PROCEDURE — 86334 IMMUNOFIX E-PHORESIS SERUM: CPT | Mod: 26,,, | Performed by: PATHOLOGY

## 2019-08-24 PROCEDURE — 84165 PROTEIN E-PHORESIS SERUM: CPT

## 2019-08-26 LAB
ALBUMIN SERPL ELPH-MCNC: 3.86 G/DL (ref 3.35–5.55)
ALPHA1 GLOB SERPL ELPH-MCNC: 0.31 G/DL (ref 0.17–0.41)
ALPHA2 GLOB SERPL ELPH-MCNC: 0.75 G/DL (ref 0.43–0.99)
B-GLOBULIN SERPL ELPH-MCNC: 0.92 G/DL (ref 0.5–1.1)
GAMMA GLOB SERPL ELPH-MCNC: 1.27 G/DL (ref 0.67–1.58)
INTERPRETATION SERPL IFE-IMP: NORMAL
KAPPA LC SER QL IA: 3.84 MG/DL (ref 0.33–1.94)
KAPPA LC/LAMBDA SER IA: 1.98 (ref 0.26–1.65)
LAMBDA LC SER QL IA: 1.94 MG/DL (ref 0.57–2.63)
PATH REV BLD -IMP: NORMAL
PATH REV BLD -IMP: NORMAL
PATHOLOGIST INTERPRETATION IFE: NORMAL
PATHOLOGIST INTERPRETATION SPE: NORMAL
PROT SERPL-MCNC: 7.1 G/DL (ref 6–8.4)

## 2019-08-29 RX ORDER — IRBESARTAN AND HYDROCHLOROTHIAZIDE 300; 12.5 MG/1; MG/1
1 TABLET, FILM COATED ORAL DAILY
Qty: 30 TABLET | Refills: 0 | Status: SHIPPED | OUTPATIENT
Start: 2019-08-29 | End: 2019-10-02 | Stop reason: SDUPTHER

## 2019-08-29 NOTE — TELEPHONE ENCOUNTER
----- Message from Padmini Radford sent at 8/29/2019  3:44 PM CDT -----  Contact: patient  Type:  Patient Returning Call    Who Called:  patient  Who Left Message for Patient:  ran  Does the patient know what this is regarding?:    Best Call Back Number:  [phone]  Additional Information:

## 2019-08-30 ENCOUNTER — CLINICAL SUPPORT (OUTPATIENT)
Dept: FAMILY MEDICINE | Facility: CLINIC | Age: 59
End: 2019-08-30
Payer: COMMERCIAL

## 2019-08-30 ENCOUNTER — OFFICE VISIT (OUTPATIENT)
Dept: HEMATOLOGY/ONCOLOGY | Facility: CLINIC | Age: 59
End: 2019-08-30
Payer: COMMERCIAL

## 2019-08-30 VITALS
RESPIRATION RATE: 18 BRPM | DIASTOLIC BLOOD PRESSURE: 63 MMHG | OXYGEN SATURATION: 96 % | TEMPERATURE: 99 F | HEIGHT: 69 IN | SYSTOLIC BLOOD PRESSURE: 141 MMHG | WEIGHT: 222.69 LBS | BODY MASS INDEX: 32.98 KG/M2 | HEART RATE: 98 BPM

## 2019-08-30 VITALS — DIASTOLIC BLOOD PRESSURE: 60 MMHG | SYSTOLIC BLOOD PRESSURE: 132 MMHG

## 2019-08-30 DIAGNOSIS — D50.8 IRON DEFICIENCY ANEMIA SECONDARY TO INADEQUATE DIETARY IRON INTAKE: Primary | ICD-10-CM

## 2019-08-30 DIAGNOSIS — D53.9 DEFICIENCY ANEMIA: ICD-10-CM

## 2019-08-30 DIAGNOSIS — D64.9 ANEMIA, UNSPECIFIED TYPE: ICD-10-CM

## 2019-08-30 DIAGNOSIS — E11.59 HYPERTENSION ASSOCIATED WITH DIABETES: Primary | ICD-10-CM

## 2019-08-30 DIAGNOSIS — I15.2 HYPERTENSION ASSOCIATED WITH DIABETES: Primary | ICD-10-CM

## 2019-08-30 PROCEDURE — 99214 PR OFFICE/OUTPT VISIT, EST, LEVL IV, 30-39 MIN: ICD-10-PCS | Mod: S$GLB,,, | Performed by: INTERNAL MEDICINE

## 2019-08-30 PROCEDURE — 99999 PR PBB SHADOW E&M-EST. PATIENT-LVL III: CPT | Mod: PBBFAC,,, | Performed by: INTERNAL MEDICINE

## 2019-08-30 PROCEDURE — 99999 PR PBB SHADOW E&M-EST. PATIENT-LVL III: ICD-10-PCS | Mod: PBBFAC,,, | Performed by: INTERNAL MEDICINE

## 2019-08-30 PROCEDURE — 99214 OFFICE O/P EST MOD 30 MIN: CPT | Mod: S$GLB,,, | Performed by: INTERNAL MEDICINE

## 2019-08-30 NOTE — PROGRESS NOTES
HPI    59 years old  female presented to [facility] Clinic for evaluation of anemia.  Patient had a stool occult blood x3 normal. On 05/25/2019 patient had a WBC of 3.83, hemoglobin 10.8 grams/deciliter, platelets of 290 K. stool call is negative x3. No additional workup has been done for this patient.    08/30/2019: clinic follow-up:  Denies chest pain shortness breath.  Denies abdominal pain nausea vomiting diarrhea.  Denies fever or chills.  Negative family history for blood disorders.    Fourteen point review system negative as above.      Past Medical History:   Diagnosis Date    Diabetes mellitus     Diabetic retinopathy     s/p laser treatment    Glaucoma     Hypertension     S/P colonoscopy     3/19 next due 3/29     Social History     Socioeconomic History    Marital status:      Spouse name: Not on file    Number of children: Not on file    Years of education: Not on file    Highest education level: Not on file   Occupational History    Not on file   Social Needs    Financial resource strain: Not on file    Food insecurity:     Worry: Not on file     Inability: Not on file    Transportation needs:     Medical: Not on file     Non-medical: Not on file   Tobacco Use    Smoking status: Never Smoker    Smokeless tobacco: Never Used   Substance and Sexual Activity    Alcohol use: No     Frequency: Never    Drug use: No    Sexual activity: Not Currently   Lifestyle    Physical activity:     Days per week: Not on file     Minutes per session: Not on file    Stress: Not on file   Relationships    Social connections:     Talks on phone: Not on file     Gets together: Not on file     Attends Christian service: Not on file     Active member of club or organization: Not on file     Attends meetings of clubs or organizations: Not on file     Relationship status: Not on file   Other Topics Concern    Not on file   Social History Narrative    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       Objective    Physical Exam   Vitals:    08/30/19 1526   BP: (!) 141/63   Pulse: 98   Resp: 18   Temp: 99.3 °F (37.4 °C)       Constitutional:  Alert oriented x3 no acute distress.   HENT:  Normocephalic atraumatic pupils equal round reactive to light extraocular muscle intact.  Cardiovascular:  Normal rate  Pulmonary/Chest:  Clear to auscultate bilaterally   Abdominal: Soft. Normal appearance and bowel sounds are normal. patient exhibits no distension and no mass. There is no hepatosplenomegaly. There is no tenderness.   Musculoskeletal: Normal range of motion. Gait is normal No clubbing, cyanosis     Lymphadenopathy:  Negative for lymphadenopathy  Neurological: patient is alert and oriented to person, place, and time. patient has normal strength and normal reflexes. No sensory deficit. Gait normal.   Skin:  Warm and dry   Psychiatric:  Normal affect  Vitals reviewed.     CMP  Sodium   Date Value Ref Range Status   08/24/2019 139 136 - 145 mmol/L Final     Potassium   Date Value Ref Range Status   08/24/2019 4.3 3.5 - 5.1 mmol/L Final     Chloride   Date Value Ref Range Status   08/24/2019 103 95 - 110 mmol/L Final     CO2   Date Value Ref Range Status   08/24/2019 25 23 - 29 mmol/L Final     Glucose   Date Value Ref Range Status   08/24/2019 168 (H) 70 - 110 mg/dL Final     BUN, Bld   Date Value Ref Range Status   08/24/2019 25 (H) 6 - 20 mg/dL Final     Creatinine   Date Value Ref Range Status   08/24/2019 1.3 0.5 - 1.4 mg/dL Final     Calcium   Date Value Ref Range Status   08/24/2019 9.9 8.7 - 10.5 mg/dL Final     Total Protein   Date Value Ref Range Status   08/24/2019 7.6 6.0 - 8.4 g/dL Final     Albumin   Date Value Ref Range Status   08/24/2019 4.0 3.5 - 5.2 g/dL Final     Total Bilirubin   Date Value Ref Range Status   08/24/2019 0.3 0.1 - 1.0 mg/dL Final     Comment:     For infants and newborns, interpretation of results should be based  on gestational age, weight and in  agreement with clinical  observations.  Premature Infant recommended reference ranges:  Up to 24 hours.............<8.0 mg/dL  Up to 48 hours............<12.0 mg/dL  3-5 days..................<15.0 mg/dL  6-29 days.................<15.0 mg/dL       Alkaline Phosphatase   Date Value Ref Range Status   08/24/2019 61 55 - 135 U/L Final     AST   Date Value Ref Range Status   08/24/2019 14 10 - 40 U/L Final     ALT   Date Value Ref Range Status   08/24/2019 15 10 - 44 U/L Final     Anion Gap   Date Value Ref Range Status   08/24/2019 11 8 - 16 mmol/L Final     eGFR if    Date Value Ref Range Status   08/24/2019 52 (A) >60 mL/min/1.73 m^2 Final     eGFR if non    Date Value Ref Range Status   08/24/2019 45 (A) >60 mL/min/1.73 m^2 Final     Comment:     Calculation used to obtain the estimated glomerular filtration  rate (eGFR) is the CKD-EPI equation.        Lab Results   Component Value Date    WBC 3.35 (L) 08/24/2019    HGB 10.4 (L) 08/24/2019    HCT 32.2 (L) 08/24/2019    MCV 85 08/24/2019     08/24/2019     Iron panel 06/21/2019  Ferritin 21  Iron 65, TIBC is 416, iron saturation 16%  Folate greater than 20  B12 314    Comment: Electronically reviewed and signed by:   Akua Garcia M.D.   Signed on 08/26/19 at 14:00   PATHOLOGIST INTERPRETATION   RBC- Normocytic anemia, no significant morphologic abnormalities.   WBC- Mild leukopenia with an absolute neutropenia. No dysplasia or   circulating blasts.   PLT- Normal morphology and differential.   Interpreted by Akua Garcia M.D.       Assessment    Anemia normocytic.   06/21/2019 hemoglobin 10.9 grams/deciliter, WBC 3.97, platelets 280 K.    Negative stool occult blood    Iron deficiency     Plan    Repeat protein studies    Repeat iron studies    RTC 2 months    Continue oral iron supplements

## 2019-08-30 NOTE — PROGRESS NOTES
Nohelia Rodriguez 59 y.o. female is here today for Blood Pressure check.   History of HTN yes.    Review of patient's allergies indicates:  No Known Allergies  Creatinine   Date Value Ref Range Status   08/24/2019 1.3 0.5 - 1.4 mg/dL Final     Sodium   Date Value Ref Range Status   08/24/2019 139 136 - 145 mmol/L Final     Potassium   Date Value Ref Range Status   08/24/2019 4.3 3.5 - 5.1 mmol/L Final   ]  Patient verifies taking blood pressure medications on a regular basis at the same time of the day.     Current Outpatient Medications:     diltiaZEM (CARDIZEM LA) 360 mg 24 hr tablet, Take 1 tablet (360 mg total) by mouth once daily., Disp: 30 tablet, Rfl: 1    glimepiride (AMARYL) 2 MG tablet, Take 2 mg by mouth daily with breakfast. , Disp: , Rfl: 5    irbesartan-hydrochlorothiazide (AVALIDE) 300-12.5 mg per tablet, TAKE 1 TABLET BY MOUTH ONCE DAILY, Disp: 30 tablet, Rfl: 0    LANTUS SOLOSTAR U-100 INSULIN glargine 100 units/mL (3mL) SubQ pen, , Disp: , Rfl:     latanoprost 0.005 % ophthalmic solution, , Disp: , Rfl:     metFORMIN (GLUCOPHAGE) 1000 MG tablet, , Disp: , Rfl:     rosuvastatin (CRESTOR) 10 MG tablet, Take 1 tablet (10 mg total) by mouth once daily., Disp: 90 tablet, Rfl: 3  Does patient have record of home blood pressure readings no.   Last dose of blood pressure medication was taken at 0600.  Patient is asymptomatic.   Complains of nothing.      ,   .    Blood pressure [id number] after 15 minutes 132/60  Dr. Barrett notified.

## 2019-09-12 ENCOUNTER — TELEPHONE (OUTPATIENT)
Dept: FAMILY MEDICINE | Facility: CLINIC | Age: 59
End: 2019-09-12

## 2019-09-12 RX ORDER — DILTIAZEM HYDROCHLORIDE EXTENDED-RELEASE TABLETS 360 MG/1
TABLET, EXTENDED RELEASE ORAL
Qty: 30 TABLET | Refills: 1 | Status: SHIPPED | OUTPATIENT
Start: 2019-09-12 | End: 2019-11-15 | Stop reason: SDUPTHER

## 2019-09-13 ENCOUNTER — TELEPHONE (OUTPATIENT)
Dept: FAMILY MEDICINE | Facility: CLINIC | Age: 59
End: 2019-09-13

## 2019-09-13 NOTE — TELEPHONE ENCOUNTER
----- Message from Stefani Muñoz sent at 9/13/2019  3:56 PM CDT -----  Contact: pt [phone]  Patient  Called she just missed a call back from the office. Call placed to pod.

## 2019-09-26 ENCOUNTER — OFFICE VISIT (OUTPATIENT)
Dept: FAMILY MEDICINE | Facility: CLINIC | Age: 59
End: 2019-09-26
Payer: COMMERCIAL

## 2019-09-26 ENCOUNTER — TELEPHONE (OUTPATIENT)
Dept: FAMILY MEDICINE | Facility: CLINIC | Age: 59
End: 2019-09-26

## 2019-09-26 DIAGNOSIS — I10 ESSENTIAL HYPERTENSION: ICD-10-CM

## 2019-09-26 DIAGNOSIS — S86.911A KNEE STRAIN, RIGHT, INITIAL ENCOUNTER: ICD-10-CM

## 2019-09-26 DIAGNOSIS — M19.90 ARTHRITIS: Primary | ICD-10-CM

## 2019-09-26 PROCEDURE — 99213 PR OFFICE/OUTPT VISIT, EST, LEVL III, 20-29 MIN: ICD-10-PCS | Mod: S$GLB,,, | Performed by: NURSE PRACTITIONER

## 2019-09-26 PROCEDURE — 99213 OFFICE O/P EST LOW 20 MIN: CPT | Mod: S$GLB,,, | Performed by: NURSE PRACTITIONER

## 2019-09-26 NOTE — TELEPHONE ENCOUNTER
----- Message from Saskia Wylie sent at 9/26/2019 11:18 AM CDT -----  Contact: patient  Type: Needs Medical Advice    Who Called:  Patient  Best Call Back Number: [phone] (home)   Additional Information: patient said she is on her way she will be about 10-15 min late her relief was late but she is enroute

## 2019-09-30 VITALS
SYSTOLIC BLOOD PRESSURE: 140 MMHG | WEIGHT: 221.63 LBS | OXYGEN SATURATION: 99 % | TEMPERATURE: 98 F | DIASTOLIC BLOOD PRESSURE: 79 MMHG | BODY MASS INDEX: 32.83 KG/M2 | HEIGHT: 69 IN | HEART RATE: 99 BPM | RESPIRATION RATE: 20 BRPM

## 2019-10-03 NOTE — TELEPHONE ENCOUNTER
Melodie,  I noticed her BP was elevated at her recent visit  Since her BP was not at goal at her recent visit, was there a plan to adjust her med before her Nov visit?

## 2019-10-04 RX ORDER — IRBESARTAN AND HYDROCHLOROTHIAZIDE 300; 12.5 MG/1; MG/1
1 TABLET, FILM COATED ORAL DAILY
Qty: 30 TABLET | Refills: 0 | Status: SHIPPED | OUTPATIENT
Start: 2019-10-04 | End: 2019-11-05 | Stop reason: SDUPTHER

## 2019-10-04 NOTE — TELEPHONE ENCOUNTER
No. She was in discomfort at the time. Said her home readings were in good range. She was going to bring her home cuff by the office to compare for accuracy.

## 2019-10-26 ENCOUNTER — LAB VISIT (OUTPATIENT)
Dept: LAB | Facility: HOSPITAL | Age: 59
End: 2019-10-26
Attending: FAMILY MEDICINE
Payer: COMMERCIAL

## 2019-10-26 DIAGNOSIS — D64.9 ANEMIA, UNSPECIFIED TYPE: ICD-10-CM

## 2019-10-26 DIAGNOSIS — D50.8 IRON DEFICIENCY ANEMIA SECONDARY TO INADEQUATE DIETARY IRON INTAKE: ICD-10-CM

## 2019-10-26 DIAGNOSIS — D53.9 DEFICIENCY ANEMIA: ICD-10-CM

## 2019-10-26 LAB
ALBUMIN SERPL BCP-MCNC: 3.9 G/DL (ref 3.5–5.2)
ALP SERPL-CCNC: 66 U/L (ref 55–135)
ALT SERPL W/O P-5'-P-CCNC: 16 U/L (ref 10–44)
ANION GAP SERPL CALC-SCNC: 10 MMOL/L (ref 8–16)
AST SERPL-CCNC: 17 U/L (ref 10–40)
BASOPHILS # BLD AUTO: 0.03 K/UL (ref 0–0.2)
BASOPHILS NFR BLD: 1.1 % (ref 0–1.9)
BILIRUB SERPL-MCNC: 0.3 MG/DL (ref 0.1–1)
BUN SERPL-MCNC: 17 MG/DL (ref 6–20)
CALCIUM SERPL-MCNC: 10.3 MG/DL (ref 8.7–10.5)
CHLORIDE SERPL-SCNC: 103 MMOL/L (ref 95–110)
CO2 SERPL-SCNC: 28 MMOL/L (ref 23–29)
CREAT SERPL-MCNC: 1.1 MG/DL (ref 0.5–1.4)
DIFFERENTIAL METHOD: ABNORMAL
EOSINOPHIL # BLD AUTO: 0.1 K/UL (ref 0–0.5)
EOSINOPHIL NFR BLD: 3.9 % (ref 0–8)
ERYTHROCYTE [DISTWIDTH] IN BLOOD BY AUTOMATED COUNT: 14.3 % (ref 11.5–14.5)
EST. GFR  (AFRICAN AMERICAN): >60 ML/MIN/1.73 M^2
EST. GFR  (NON AFRICAN AMERICAN): 55 ML/MIN/1.73 M^2
FERRITIN SERPL-MCNC: 46 NG/ML (ref 20–300)
GLUCOSE SERPL-MCNC: 182 MG/DL (ref 70–110)
HCT VFR BLD AUTO: 32 % (ref 37–48.5)
HGB BLD-MCNC: 10.5 G/DL (ref 12–16)
IRON SERPL-MCNC: 60 UG/DL (ref 30–160)
LYMPHOCYTES # BLD AUTO: 1.2 K/UL (ref 1–4.8)
LYMPHOCYTES NFR BLD: 43.4 % (ref 18–48)
MCH RBC QN AUTO: 28.3 PG (ref 27–31)
MCHC RBC AUTO-ENTMCNC: 32.8 G/DL (ref 32–36)
MCV RBC AUTO: 86 FL (ref 82–98)
MONOCYTES # BLD AUTO: 0.2 K/UL (ref 0.3–1)
MONOCYTES NFR BLD: 8.5 % (ref 4–15)
NEUTROPHILS # BLD AUTO: 1.2 K/UL (ref 1.8–7.7)
NEUTROPHILS NFR BLD: 43.1 % (ref 38–73)
PLATELET # BLD AUTO: 282 K/UL (ref 150–350)
PMV BLD AUTO: 10.3 FL (ref 9.2–12.9)
POTASSIUM SERPL-SCNC: 4.1 MMOL/L (ref 3.5–5.1)
PROT SERPL-MCNC: 7.8 G/DL (ref 6–8.4)
RBC # BLD AUTO: 3.71 M/UL (ref 4–5.4)
SATURATED IRON: 14 % (ref 20–50)
SODIUM SERPL-SCNC: 141 MMOL/L (ref 136–145)
TOTAL IRON BINDING CAPACITY: 420 UG/DL (ref 250–450)
TRANSFERRIN SERPL-MCNC: 284 MG/DL (ref 200–375)
WBC # BLD AUTO: 2.81 K/UL (ref 3.9–12.7)

## 2019-10-26 PROCEDURE — 84165 PATHOLOGIST INTERPRETATION SPE: ICD-10-PCS | Mod: 26,,, | Performed by: PATHOLOGY

## 2019-10-26 PROCEDURE — 86334 IMMUNOFIX E-PHORESIS SERUM: CPT | Mod: 26,,, | Performed by: PATHOLOGY

## 2019-10-26 PROCEDURE — 82728 ASSAY OF FERRITIN: CPT

## 2019-10-26 PROCEDURE — 83540 ASSAY OF IRON: CPT

## 2019-10-26 PROCEDURE — 84165 PROTEIN E-PHORESIS SERUM: CPT | Mod: 26,,, | Performed by: PATHOLOGY

## 2019-10-26 PROCEDURE — 80053 COMPREHEN METABOLIC PANEL: CPT | Mod: PO

## 2019-10-26 PROCEDURE — 86334 PATHOLOGIST INTERPRETATION IFE: ICD-10-PCS | Mod: 26,,, | Performed by: PATHOLOGY

## 2019-10-26 PROCEDURE — 36415 COLL VENOUS BLD VENIPUNCTURE: CPT | Mod: PO

## 2019-10-26 PROCEDURE — 85025 COMPLETE CBC W/AUTO DIFF WBC: CPT | Mod: PO

## 2019-10-26 PROCEDURE — 84165 PROTEIN E-PHORESIS SERUM: CPT

## 2019-10-26 PROCEDURE — 83520 IMMUNOASSAY QUANT NOS NONAB: CPT

## 2019-10-26 PROCEDURE — 86334 IMMUNOFIX E-PHORESIS SERUM: CPT

## 2019-10-28 LAB
ALBUMIN SERPL ELPH-MCNC: 3.94 G/DL (ref 3.35–5.55)
ALPHA1 GLOB SERPL ELPH-MCNC: 0.3 G/DL (ref 0.17–0.41)
ALPHA2 GLOB SERPL ELPH-MCNC: 0.71 G/DL (ref 0.43–0.99)
B-GLOBULIN SERPL ELPH-MCNC: 0.94 G/DL (ref 0.5–1.1)
GAMMA GLOB SERPL ELPH-MCNC: 1.32 G/DL (ref 0.67–1.58)
INTERPRETATION SERPL IFE-IMP: NORMAL
KAPPA LC SER QL IA: 3.68 MG/DL (ref 0.33–1.94)
KAPPA LC/LAMBDA SER IA: 1.9 (ref 0.26–1.65)
LAMBDA LC SER QL IA: 1.94 MG/DL (ref 0.57–2.63)
PATHOLOGIST INTERPRETATION IFE: NORMAL
PATHOLOGIST INTERPRETATION SPE: NORMAL
PROT SERPL-MCNC: 7.2 G/DL (ref 6–8.4)

## 2019-11-05 ENCOUNTER — PATIENT OUTREACH (OUTPATIENT)
Dept: ADMINISTRATIVE | Facility: HOSPITAL | Age: 59
End: 2019-11-05

## 2019-11-05 DIAGNOSIS — E11.9 DIABETES MELLITUS WITHOUT COMPLICATION: Primary | ICD-10-CM

## 2019-11-05 NOTE — LETTER
November 5, 2019    Nohelia Rodriguez  [address] [facility] [address]             [facility]  [address]  [facility] [address]  Phone: [phone] [facility].  To help you get the most out of each of your visits, we will review your information to make sure you are up to date on all of your recommended tests and/or procedures.      Katelyn Barrett MD  has found that your chart shows you may be due for the following:    Tetanus Immunization  Hemoglobin A1C    If you have had any of the above done at another facility, please bring the records with you or Fax them to [phone] so that your record at [facility] will be complete. If you have not had any of these tests or procedures done recently and would like to complete this testing ,  please call [phone] or send a message through your [facility] portal to your provider's office.     If you have an upcoming scheduled appointment for the above test and/or procedures, please disregard this letter.    If you are currently taking medication, please bring it with you to your appointment for review.      Thank you for letting us care for you,        Tiffany Granger, Care Coordinator  [facility]  Phone: [phone]  Fax: [phone]

## 2019-11-05 NOTE — PROGRESS NOTES
Dear Sharon, [facility] is committed to your overall health.  To help you get the most out of each of your visits, we will review your information to make sure you are up to date on all of your recommended tests and/or procedures.      Katelyn Barrett MD  has found that your chart shows you may be due for the following:    Health Maintenance Due   Topic    TETANUS VACCINE     Hemoglobin A1c          If you have had any of the above done at another facility, please bring the records with you or Fax them to [phone] so that your record at [facility] will be complete. If you have not had any of these tests or procedures done recently and would like to complete this testing ,  please call [phone] or send a message through your [facility] portal to your provider's office.     If you have an upcoming scheduled appointment for the above test and/or procedures, please disregard this letter.    If you are currently taking medication, please bring it with you to your appointment for review.      Thank you for letting us care for you,        Tiffany Granger, Care Coordinator  [facility]  Phone: [phone]  Fax: [phone]

## 2019-11-06 RX ORDER — GLIMEPIRIDE 2 MG/1
2 TABLET ORAL
Qty: 90 TABLET | Refills: 0 | Status: SHIPPED | OUTPATIENT
Start: 2019-11-06 | End: 2019-11-19

## 2019-11-06 RX ORDER — IRBESARTAN AND HYDROCHLOROTHIAZIDE 300; 12.5 MG/1; MG/1
1 TABLET, FILM COATED ORAL DAILY
Qty: 90 TABLET | Refills: 0 | Status: SHIPPED | OUTPATIENT
Start: 2019-11-06 | End: 2020-02-06

## 2019-11-06 NOTE — TELEPHONE ENCOUNTER
----- Message from Karlos Valentine sent at 11/6/2019  9:03 AM CST -----  Contact: Patient  Type:  RX Refill Request    Who Called:  Patient  Refill or New Rx:  Refill  RX Name and Strength:    1. glimepiride (AMARYL) 2 MG tablet  2. irbesartan-hydrochlorothiazide (AVALIDE) 300-12.5 mg per tablet  How is the patient currently taking it? (ex. 1XDay):  As ordered  Is this a 30 day or 90 day RX:  30  Preferred Pharmacy with phone number:    [facility] - [place], [place] - [address]  [address]  [place] [address]  Phone: [phone] Fax: [phone]  Local or Mail Order:  Local  Ordering Provider:  Dr. John Richards Call Back Number:  [phone]  Additional Information:  Patient would like to speak to the office. Please call to advise. Thanks!

## 2019-11-08 ENCOUNTER — OFFICE VISIT (OUTPATIENT)
Dept: HEMATOLOGY/ONCOLOGY | Facility: CLINIC | Age: 59
End: 2019-11-08
Payer: COMMERCIAL

## 2019-11-08 VITALS
BODY MASS INDEX: 32.88 KG/M2 | OXYGEN SATURATION: 97 % | RESPIRATION RATE: 18 BRPM | HEART RATE: 80 BPM | WEIGHT: 222 LBS | DIASTOLIC BLOOD PRESSURE: 84 MMHG | TEMPERATURE: 99 F | SYSTOLIC BLOOD PRESSURE: 168 MMHG | HEIGHT: 69 IN

## 2019-11-08 DIAGNOSIS — D50.8 IRON DEFICIENCY ANEMIA SECONDARY TO INADEQUATE DIETARY IRON INTAKE: ICD-10-CM

## 2019-11-08 DIAGNOSIS — D64.9 ANEMIA, UNSPECIFIED TYPE: ICD-10-CM

## 2019-11-08 DIAGNOSIS — R76.8 ELEVATED SERUM IMMUNOGLOBULIN FREE LIGHT CHAIN LEVEL: Primary | ICD-10-CM

## 2019-11-08 PROCEDURE — 99214 PR OFFICE/OUTPT VISIT, EST, LEVL IV, 30-39 MIN: ICD-10-PCS | Mod: S$GLB,,, | Performed by: INTERNAL MEDICINE

## 2019-11-08 PROCEDURE — 99999 PR PBB SHADOW E&M-EST. PATIENT-LVL III: ICD-10-PCS | Mod: PBBFAC,,, | Performed by: INTERNAL MEDICINE

## 2019-11-08 PROCEDURE — 99999 PR PBB SHADOW E&M-EST. PATIENT-LVL III: CPT | Mod: PBBFAC,,, | Performed by: INTERNAL MEDICINE

## 2019-11-08 PROCEDURE — 99214 OFFICE O/P EST MOD 30 MIN: CPT | Mod: S$GLB,,, | Performed by: INTERNAL MEDICINE

## 2019-11-08 NOTE — PROGRESS NOTES
HPI    59 years old  female presented to [facility] for evaluation of anemia.  Patient had a stool occult blood x3 normal. On 05/25/2019 patient had a WBC of 3.83, hemoglobin 10.8 grams/deciliter, platelets of 290 K. stool call is negative x3. No additional workup has been done for this patient.    08/30/2019: clinic follow-up:  Denies chest pain shortness breath.  Denies abdominal pain nausea vomiting diarrhea.  Denies fever or chills.  Negative family history for blood disorders.      11/08/2019:  No complaints.  Clinical follow-up.  Fourteen point review system negative as above.      Past Medical History:   Diagnosis Date    Diabetes mellitus     Diabetic retinopathy     s/p laser treatment    Glaucoma     Hypertension     S/P colonoscopy     3/19 next due 3/29     Social History     Socioeconomic History    Marital status:      Spouse name: Not on file    Number of children: Not on file    Years of education: Not on file    Highest education level: Not on file   Occupational History    Not on file   Social Needs    Financial resource strain: Not on file    Food insecurity:     Worry: Not on file     Inability: Not on file    Transportation needs:     Medical: Not on file     Non-medical: Not on file   Tobacco Use    Smoking status: Never Smoker    Smokeless tobacco: Never Used   Substance and Sexual Activity    Alcohol use: No     Frequency: Never    Drug use: No    Sexual activity: Not Currently   Lifestyle    Physical activity:     Days per week: Not on file     Minutes per session: Not on file    Stress: Not on file   Relationships    Social connections:     Talks on phone: Not on file     Gets together: Not on file     Attends Yazdanism service: Not on file     Active member of club or organization: Not on file     Attends meetings of clubs or organizations: Not on file     Relationship status: Not on file   Other Topics Concern    Not on file    Social History Narrative    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       Objective    Physical Exam   Vitals:    11/08/19 1315   BP: (!) 189/74   Pulse: 91   Resp: 18   Temp: 98.5 °F (36.9 °C)       Constitutional:  Alert oriented x3 no acute distress.   HENT:  Normocephalic atraumatic pupils equal round reactive to light extraocular muscle intact.  Cardiovascular:  Normal rate  Pulmonary/Chest:  Clear to auscultate bilaterally   Abdominal: Soft. Normal appearance and bowel sounds are normal. patient exhibits no distension and no mass. There is no hepatosplenomegaly. There is no tenderness.   Musculoskeletal: Normal range of motion. Gait is normal No clubbing, cyanosis     Lymphadenopathy:  Negative for lymphadenopathy  Neurological: patient is alert and oriented to person, place, and time. patient has normal strength and normal reflexes. No sensory deficit. Gait normal.   Skin:  Warm and dry   Psychiatric:  Normal affect  Vitals reviewed.     CMP  Sodium   Date Value Ref Range Status   10/26/2019 141 136 - 145 mmol/L Final     Potassium   Date Value Ref Range Status   10/26/2019 4.1 3.5 - 5.1 mmol/L Final     Chloride   Date Value Ref Range Status   10/26/2019 103 95 - 110 mmol/L Final     CO2   Date Value Ref Range Status   10/26/2019 28 23 - 29 mmol/L Final     Glucose   Date Value Ref Range Status   10/26/2019 182 (H) 70 - 110 mg/dL Final     BUN, Bld   Date Value Ref Range Status   10/26/2019 17 6 - 20 mg/dL Final     Creatinine   Date Value Ref Range Status   10/26/2019 1.1 0.5 - 1.4 mg/dL Final     Calcium   Date Value Ref Range Status   10/26/2019 10.3 8.7 - 10.5 mg/dL Final     Total Protein   Date Value Ref Range Status   10/26/2019 7.8 6.0 - 8.4 g/dL Final     Albumin   Date Value Ref Range Status   10/26/2019 3.9 3.5 - 5.2 g/dL Final     Total Bilirubin   Date Value Ref Range Status   10/26/2019 0.3 0.1 - 1.0 mg/dL Final     Comment:     For infants and newborns, interpretation of results  should be based  on gestational age, weight and in agreement with clinical  observations.  Premature Infant recommended reference ranges:  Up to 24 hours.............<8.0 mg/dL  Up to 48 hours............<12.0 mg/dL  3-5 days..................<15.0 mg/dL  6-29 days.................<15.0 mg/dL       Alkaline Phosphatase   Date Value Ref Range Status   10/26/2019 66 55 - 135 U/L Final     AST   Date Value Ref Range Status   10/26/2019 17 10 - 40 U/L Final     ALT   Date Value Ref Range Status   10/26/2019 16 10 - 44 U/L Final     Anion Gap   Date Value Ref Range Status   10/26/2019 10 8 - 16 mmol/L Final     eGFR if    Date Value Ref Range Status   10/26/2019 >60 >60 mL/min/1.73 m^2 Final     eGFR if non    Date Value Ref Range Status   10/26/2019 55 (A) >60 mL/min/1.73 m^2 Final     Comment:     Calculation used to obtain the estimated glomerular filtration  rate (eGFR) is the CKD-EPI equation.        Lab Results   Component Value Date    WBC 2.81 (L) 10/26/2019    HGB 10.5 (L) 10/26/2019    HCT 32.0 (L) 10/26/2019    MCV 86 10/26/2019     10/26/2019     Iron panel 06/21/2019  Ferritin 21  Iron 65, TIBC is 416, iron saturation 16%  Folate greater than 20  B12 314    Comment: Electronically reviewed and signed by:   Akua Garcia M.D.   Signed on 08/26/19 at 14:00   PATHOLOGIST INTERPRETATION   RBC- Normocytic anemia, no significant morphologic abnormalities.   WBC- Mild leukopenia with an absolute neutropenia. No dysplasia or   circulating blasts.   PLT- Normal morphology and differential.   Interpreted by Akua Garcia M.D.       Assessment    Anemia normocytic.   06/21/2019 hemoglobin 10.9 grams/deciliter, WBC 3.97, platelets 280 K.    Negative stool occult blood    No monoclonal peak identify on SPEP PACHECO    Iron deficiency - Decreased iron saturation with normal ferritin level    hypertension    Plan    Oral iron supplement once daily empty stomach orange  juice    Offered bone marrow biopsy, however at this time patient states that she just came back from vacations and given her work schedule she is unable to take time off.  However she does agree to return to clinic in 3 months to repeat protein levels.    Recheck BP    Follow-up with primary care    M*Modal dictations

## 2019-11-15 RX ORDER — DILTIAZEM HYDROCHLORIDE EXTENDED-RELEASE TABLETS 360 MG/1
TABLET, EXTENDED RELEASE ORAL
Qty: 30 TABLET | Refills: 1 | Status: SHIPPED | OUTPATIENT
Start: 2019-11-15 | End: 2019-11-19

## 2019-11-16 ENCOUNTER — LAB VISIT (OUTPATIENT)
Dept: LAB | Facility: HOSPITAL | Age: 59
End: 2019-11-16
Attending: FAMILY MEDICINE
Payer: COMMERCIAL

## 2019-11-16 DIAGNOSIS — E11.9 DIABETES MELLITUS WITHOUT COMPLICATION: ICD-10-CM

## 2019-11-16 LAB
ESTIMATED AVG GLUCOSE: 200 MG/DL (ref 68–131)
HBA1C MFR BLD HPLC: 8.6 % (ref 4–5.6)

## 2019-11-16 PROCEDURE — 83036 HEMOGLOBIN GLYCOSYLATED A1C: CPT

## 2019-11-16 PROCEDURE — 36415 COLL VENOUS BLD VENIPUNCTURE: CPT | Mod: PO

## 2019-11-18 ENCOUNTER — TELEPHONE (OUTPATIENT)
Dept: FAMILY MEDICINE | Facility: CLINIC | Age: 59
End: 2019-11-18

## 2019-11-18 NOTE — TELEPHONE ENCOUNTER
Pls notify pt:    Diabetes is very elevated and much worse than noted before    How are your fasting sugars doing?  Are you taking the metformin 1000 BID?  How much of the levemir are you using currently?

## 2019-11-18 NOTE — TELEPHONE ENCOUNTER
Spoke to patient regarding results, states she has been taking 30 units of levemir as well as metformin 1000mg BID, patient has been experiencing stressors lately but has been exercising as well. Reports her readings have recently been ranging around .

## 2019-11-19 ENCOUNTER — OFFICE VISIT (OUTPATIENT)
Dept: FAMILY MEDICINE | Facility: CLINIC | Age: 59
End: 2019-11-19
Payer: COMMERCIAL

## 2019-11-19 VITALS
WEIGHT: 224.56 LBS | HEART RATE: 92 BPM | HEIGHT: 69 IN | TEMPERATURE: 99 F | RESPIRATION RATE: 16 BRPM | BODY MASS INDEX: 33.26 KG/M2 | OXYGEN SATURATION: 98 % | DIASTOLIC BLOOD PRESSURE: 70 MMHG | SYSTOLIC BLOOD PRESSURE: 142 MMHG

## 2019-11-19 DIAGNOSIS — E11.59 HYPERTENSION ASSOCIATED WITH DIABETES: ICD-10-CM

## 2019-11-19 DIAGNOSIS — I15.2 HYPERTENSION ASSOCIATED WITH DIABETES: ICD-10-CM

## 2019-11-19 DIAGNOSIS — E11.8 DIABETES MELLITUS TYPE 2 WITH COMPLICATIONS: Primary | ICD-10-CM

## 2019-11-19 PROCEDURE — 99214 OFFICE O/P EST MOD 30 MIN: CPT | Mod: S$GLB,,, | Performed by: FAMILY MEDICINE

## 2019-11-19 PROCEDURE — 99214 PR OFFICE/OUTPT VISIT, EST, LEVL IV, 30-39 MIN: ICD-10-PCS | Mod: S$GLB,,, | Performed by: FAMILY MEDICINE

## 2019-11-19 RX ORDER — CARVEDILOL 6.25 MG/1
6.25 TABLET ORAL 2 TIMES DAILY WITH MEALS
Qty: 60 TABLET | Refills: 2 | Status: SHIPPED | OUTPATIENT
Start: 2019-11-19 | End: 2019-12-26

## 2019-11-30 NOTE — PROGRESS NOTES
Subjective:       Patient ID: Nohelia Rodriguez is a 59 y.o. female.    Chief Complaint: Follow-up    HPI   The patient is a 59-year-old who is here today for her six-month follow-up.  Today is our 2nd visit.    Today we discussed the followin) diabetes.  Her recent A1c was 8.6 up from prior value of 6.6.  She is currently taking Glucophage 1000 mg twice a day, Amaryl 2 mg once a day and Lantus 30 units at night.  She has seen readings as high as the 240s.  She has had occasional low readings with the lowest being 54 and she can tell when her readings are low.  Recently, her readings have been in the  range.  She was previously on Trulicity but that was too expensive for her.    2) hypertension.  Her blood pressure today is 142/70.  She is currently taking Avalide 300/12.5 mg once a day    Review of Systems   Constitutional: Negative for appetite change, chills, diaphoresis, fatigue, fever and unexpected weight change.   HENT: Negative for congestion, ear pain, postnasal drip, rhinorrhea, sinus pressure, sneezing, sore throat and trouble swallowing.    Eyes: Negative for pain, discharge and visual disturbance.   Respiratory: Negative for cough, chest tightness, shortness of breath and wheezing.    Cardiovascular: Negative for chest pain, palpitations and leg swelling.   Gastrointestinal: Negative for abdominal distention, abdominal pain, blood in stool, constipation, diarrhea, nausea and vomiting.   Skin: Negative for rash.       Objective:      Physical Exam   Constitutional: She is oriented to person, place, and time. She appears well-developed and well-nourished. No distress.   HENT:   Head: Normocephalic and atraumatic.   Right Ear: Hearing, tympanic membrane, external ear and ear canal normal.   Left Ear: Hearing, tympanic membrane, external ear and ear canal normal.   Nose: Nose normal.   Mouth/Throat: Oropharynx is clear and moist and mucous membranes are normal. No oral lesions. No oropharyngeal  "exudate, posterior oropharyngeal edema or posterior oropharyngeal erythema.   Eyes: Pupils are equal, round, and reactive to light. Conjunctivae, EOM and lids are normal. No scleral icterus.   Neck: Normal range of motion. Neck supple. Carotid bruit is not present. No thyroid mass and no thyromegaly present.   Cardiovascular: Normal rate, regular rhythm and normal heart sounds.  No extrasystoles are present. PMI is not displaced. Exam reveals no gallop.   No murmur heard.  Pulmonary/Chest: Effort normal and breath sounds normal. No accessory muscle usage. No respiratory distress.   Clear to auscultation bilaterally.   Abdominal: Soft. Normal appearance and bowel sounds are normal. She exhibits no abdominal bruit. There is no hepatosplenomegaly. There is no tenderness. There is no rebound.   Lymphadenopathy:        Head (right side): No submental and no submandibular adenopathy present.        Head (left side): No submental and no submandibular adenopathy present.        Right cervical: No superficial cervical, no deep cervical and no posterior cervical adenopathy present.       Left cervical: No superficial cervical, no deep cervical and no posterior cervical adenopathy present.        Right: No supraclavicular adenopathy present.        Left: No supraclavicular adenopathy present.   Neurological: She is alert and oriented to person, place, and time.   Skin: Skin is warm, dry and intact.   Psychiatric: She has a normal mood and affect.     Blood pressure (!) 142/70, pulse 92, temperature 98.8 °F (37.1 °C), temperature source Oral, resp. rate 16, height 5' 9" (1.753 m), weight 101.9 kg (224 lb 8.6 oz), SpO2 98 %.Body mass index is 33.16 kg/m².          A/P:  1) diabetes.  Uncontrolled with recently improved readings.  We are going to stop the Amaryl.  She will continue with the Glucophage and the Lantus.  We are going to try to start Trulicity back.  If this is too costly for her, she will let us know and we will try " different agent.  She will send me update with her fasting sugars in 2 weeks.  I would like to see her back in 4 weeks for followup  2)  Hypertension.  Uncontrolled.  We are going to continue with her Avalide.  We are going to stop the Cardizem.  We are going to start Coreg 6.25 mg twice a day and increase this dose further if needed.  We will reassess this at her next visit in 4 weeks

## 2019-12-17 ENCOUNTER — TELEPHONE (OUTPATIENT)
Dept: FAMILY MEDICINE | Facility: CLINIC | Age: 59
End: 2019-12-17

## 2019-12-17 NOTE — TELEPHONE ENCOUNTER
----- Message from Elena Woods sent at 12/17/2019  8:38 AM CST -----  Type:  Sooner Apoointment Request    Caller is requesting a sooner appointment.  Caller declined first available appointment listed below.  Caller will not accept being placed on the waitlist and is requesting a message be sent to doctor.    Name of Caller: self   When is the first available appointment?  01/2020  Symptoms:    Best Call Back Number:  [phone]   Additional Information:  Patient [place] Patient requesting to come in tomorrow to be seen.

## 2019-12-26 ENCOUNTER — OFFICE VISIT (OUTPATIENT)
Dept: FAMILY MEDICINE | Facility: CLINIC | Age: 59
End: 2019-12-26
Payer: COMMERCIAL

## 2019-12-26 VITALS
HEIGHT: 69 IN | WEIGHT: 219.69 LBS | SYSTOLIC BLOOD PRESSURE: 138 MMHG | TEMPERATURE: 98 F | OXYGEN SATURATION: 98 % | DIASTOLIC BLOOD PRESSURE: 78 MMHG | RESPIRATION RATE: 18 BRPM | HEART RATE: 102 BPM | BODY MASS INDEX: 32.54 KG/M2

## 2019-12-26 DIAGNOSIS — Z12.39 BREAST CANCER SCREENING: ICD-10-CM

## 2019-12-26 DIAGNOSIS — E11.8 DIABETES MELLITUS TYPE 2 WITH COMPLICATIONS: Primary | ICD-10-CM

## 2019-12-26 DIAGNOSIS — I10 HYPERTENSION, ESSENTIAL: ICD-10-CM

## 2019-12-26 PROCEDURE — 99214 OFFICE O/P EST MOD 30 MIN: CPT | Mod: S$GLB,,, | Performed by: FAMILY MEDICINE

## 2019-12-26 PROCEDURE — 99214 PR OFFICE/OUTPT VISIT, EST, LEVL IV, 30-39 MIN: ICD-10-PCS | Mod: S$GLB,,, | Performed by: FAMILY MEDICINE

## 2019-12-26 RX ORDER — GLIMEPIRIDE 2 MG/1
2 TABLET ORAL DAILY
Refills: 0 | COMMUNITY
Start: 2019-12-06 | End: 2019-12-26

## 2019-12-26 RX ORDER — CARVEDILOL 12.5 MG/1
12.5 TABLET ORAL 2 TIMES DAILY WITH MEALS
Qty: 60 TABLET | Refills: 1 | Status: SHIPPED | OUTPATIENT
Start: 2019-12-26 | End: 2020-03-01

## 2019-12-27 NOTE — PROGRESS NOTES
Subjective:       Patient ID: Nohelia Rodriguez is a 59 y.o. female.    Chief Complaint: Follow-up    HPI   The patient is a 59-year-old who is here today for short-term follow-up on her diabetes and hypertension.    Regarding her diabetes, her sugars have been better.   Over the past 3 weeks, her sugars have ranged from .  She has not had any hypoglycemic episodes.  She is currently taking Glucophage 1000 mg twice a day, Amaryl 2 mg once a day (that she did not stop this as previously instructed), Trulicity 0.75 mg once a week and Lantus 30 units at night.    Regarding her hypertension, her blood pressures have been better with the Coreg.  Her systolic readings have been in the 120s to 140s.  She is currently taking Avalide  300/12.5 mg and Coreg 6.25 mg twice a day    Review of Systems   Constitutional: Negative for appetite change, chills, diaphoresis, fatigue, fever and unexpected weight change.   HENT: Negative for congestion, ear pain, postnasal drip, rhinorrhea, sinus pressure, sneezing, sore throat and trouble swallowing.    Eyes: Negative for pain, discharge and visual disturbance.   Respiratory: Negative for cough, chest tightness, shortness of breath and wheezing.    Cardiovascular: Negative for chest pain, palpitations and leg swelling.   Gastrointestinal: Negative for abdominal distention, abdominal pain, blood in stool, constipation, diarrhea, nausea and vomiting.   Skin: Negative for rash.       Objective:      Physical Exam   Constitutional: She is oriented to person, place, and time. She appears well-developed and well-nourished. No distress.   HENT:   Head: Normocephalic and atraumatic.   Right Ear: Hearing, tympanic membrane, external ear and ear canal normal.   Left Ear: Hearing, tympanic membrane, external ear and ear canal normal.   Nose: Nose normal.   Mouth/Throat: Oropharynx is clear and moist and mucous membranes are normal. No oral lesions. No oropharyngeal exudate, posterior  "oropharyngeal edema or posterior oropharyngeal erythema.   Eyes: Pupils are equal, round, and reactive to light. Conjunctivae, EOM and lids are normal. No scleral icterus.   Neck: Normal range of motion. Neck supple. Carotid bruit is not present. No thyroid mass and no thyromegaly present.   Cardiovascular: Normal rate, regular rhythm and normal heart sounds.  No extrasystoles are present. PMI is not displaced. Exam reveals no gallop.   No murmur heard.  Pulmonary/Chest: Effort normal and breath sounds normal. No accessory muscle usage. No respiratory distress.   Clear to auscultation bilaterally.   Abdominal: Soft. Normal appearance and bowel sounds are normal. She exhibits no abdominal bruit. There is no hepatosplenomegaly. There is no tenderness. There is no rebound.   Lymphadenopathy:        Head (right side): No submental and no submandibular adenopathy present.        Head (left side): No submental and no submandibular adenopathy present.        Right cervical: No superficial cervical, no deep cervical and no posterior cervical adenopathy present.       Left cervical: No superficial cervical, no deep cervical and no posterior cervical adenopathy present.        Right: No supraclavicular adenopathy present.        Left: No supraclavicular adenopathy present.   Neurological: She is alert and oriented to person, place, and time.   Skin: Skin is warm, dry and intact.   Psychiatric: She has a normal mood and affect.     Blood pressure 138/78, pulse 102, temperature 98.4 °F (36.9 °C), temperature source Oral, resp. rate 18, height 5' 9" (1.753 m), weight 99.6 kg (219 lb 11 oz), SpO2 98 %.Body mass index is 32.44 kg/m².            A/P:  1) diabetes.  Improved control.  We are going to stop the Amaryl.  She will continue with the Glucophage, Trulicity and Lantus.  If her fasting sugars are consistently outside of the  range, she will let me know.  We will check an A1c with her upcoming hematology labs   2)  " Hypertension.  Improved but still suboptimally controlled.  She will continue with the Avalide.  We are going to increase the Coreg to 12.5 mg twice a day   3)  Health maintenance issues.  We will schedule her mammogram    I will see her back in 6 weeks for follow-up or sooner if needed

## 2020-01-10 RX ORDER — DULAGLUTIDE 0.75 MG/.5ML
INJECTION, SOLUTION SUBCUTANEOUS
Qty: 4 ML | Refills: 2 | Status: SHIPPED | OUTPATIENT
Start: 2020-01-10 | End: 2020-02-06

## 2020-01-13 RX ORDER — INSULIN GLARGINE 100 [IU]/ML
30 INJECTION, SOLUTION SUBCUTANEOUS NIGHTLY
Qty: 15 ML | Refills: 2 | Status: SHIPPED | OUTPATIENT
Start: 2020-01-13 | End: 2020-02-06 | Stop reason: SDUPTHER

## 2020-01-13 NOTE — TELEPHONE ENCOUNTER
----- Message from Laure Monahan sent at 1/13/2020  3:53 PM CST -----  Contact: pt  Type: Needs Medical Advice    Who Called:      Best Call Back Number:     Additional Information: Requesting a call back from Nurse, regarding a refill for Rx LANTUS SOLOSTAR U-100 INSULIN glargine 100 units/mL (3mL) SubQ pen, and would for refills and pt would like this called in TODAY she will be out for tomorrow,please call ASAP

## 2020-01-23 ENCOUNTER — PATIENT OUTREACH (OUTPATIENT)
Dept: ADMINISTRATIVE | Facility: HOSPITAL | Age: 60
End: 2020-01-23

## 2020-01-23 NOTE — PROGRESS NOTES
Health Maintenance Due   Topic Date Due    HIV Screening  02/21/1975     Future Appointments   Date Time Provider Department Center   2/1/2020 11:00 AM [facility] M[facility]MO1 [facility] MAMMO [place]   2/1/2020 11:30 AM LAB, [facility] LAB [place]   2/6/2020 11:30 AM Katelyn Barrett MD [facility] MED [place]   2/7/2020  3:00 PM Vinayak Riley MD [facility]

## 2020-02-01 ENCOUNTER — HOSPITAL ENCOUNTER (OUTPATIENT)
Dept: RADIOLOGY | Facility: HOSPITAL | Age: 60
Discharge: HOME | End: 2020-02-01
Attending: FAMILY MEDICINE
Payer: COMMERCIAL

## 2020-02-01 DIAGNOSIS — Z12.31 BREAST CANCER SCREENING BY MAMMOGRAM: ICD-10-CM

## 2020-02-01 PROCEDURE — 77063 BREAST TOMOSYNTHESIS BI: CPT | Mod: 26,,, | Performed by: RADIOLOGY

## 2020-02-01 PROCEDURE — 77067 SCR MAMMO BI INCL CAD: CPT | Mod: TC,PO

## 2020-02-01 PROCEDURE — 77067 MAMMO DIGITAL SCREENING BILAT WITH TOMOSYNTHESIS_CAD: ICD-10-PCS | Mod: 26,,, | Performed by: RADIOLOGY

## 2020-02-01 PROCEDURE — 77063 MAMMO DIGITAL SCREENING BILAT WITH TOMOSYNTHESIS_CAD: ICD-10-PCS | Mod: 26,,, | Performed by: RADIOLOGY

## 2020-02-01 PROCEDURE — 77067 SCR MAMMO BI INCL CAD: CPT | Mod: 26,,, | Performed by: RADIOLOGY

## 2020-02-02 ENCOUNTER — TELEPHONE (OUTPATIENT)
Dept: FAMILY MEDICINE | Facility: CLINIC | Age: 60
End: 2020-02-02

## 2020-02-02 NOTE — TELEPHONE ENCOUNTER
Pls notify pt:    Your diabetes is doing great and has significantly improved!!  Continue your current medicines and keep upcoming appt

## 2020-02-03 ENCOUNTER — TELEPHONE (OUTPATIENT)
Dept: HEMATOLOGY/ONCOLOGY | Facility: CLINIC | Age: 60
End: 2020-02-03

## 2020-02-03 NOTE — TELEPHONE ENCOUNTER
----- Message from Pinky Bajwa sent at 2/3/2020  4:02 PM CST -----  Contact: patient  Type: Needs Medical Advice  Who Called:  patient  Best Call Back Number: [phone]  Additional Information: Patient can not come to apt. on Friday if she can not have Thursday apt. She will keep apt, but patient would like to reschedule for Thursday.  Please call to advise.  Thanks!

## 2020-02-03 NOTE — TELEPHONE ENCOUNTER
----- Message from Kristy Ewing sent at 2/3/2020  9:54 AM CST -----  Contact: pt  Pt calling wanting to reschedule her 2/7 appointment ,please to Thursday afternoon/evening 2/6...[phone] (home)

## 2020-02-06 ENCOUNTER — OFFICE VISIT (OUTPATIENT)
Dept: FAMILY MEDICINE | Facility: CLINIC | Age: 60
End: 2020-02-06
Payer: COMMERCIAL

## 2020-02-06 VITALS
SYSTOLIC BLOOD PRESSURE: 152 MMHG | DIASTOLIC BLOOD PRESSURE: 96 MMHG | RESPIRATION RATE: 16 BRPM | HEART RATE: 85 BPM | WEIGHT: 212.31 LBS | BODY MASS INDEX: 31.44 KG/M2 | HEIGHT: 69 IN | OXYGEN SATURATION: 98 % | TEMPERATURE: 99 F

## 2020-02-06 DIAGNOSIS — E11.59 HYPERTENSION ASSOCIATED WITH DIABETES: ICD-10-CM

## 2020-02-06 DIAGNOSIS — E11.8 DIABETES MELLITUS TYPE 2 WITH COMPLICATIONS: Primary | ICD-10-CM

## 2020-02-06 DIAGNOSIS — I15.2 HYPERTENSION ASSOCIATED WITH DIABETES: ICD-10-CM

## 2020-02-06 PROCEDURE — 99214 PR OFFICE/OUTPT VISIT, EST, LEVL IV, 30-39 MIN: ICD-10-PCS | Mod: S$GLB,,, | Performed by: FAMILY MEDICINE

## 2020-02-06 PROCEDURE — 99214 OFFICE O/P EST MOD 30 MIN: CPT | Mod: S$GLB,,, | Performed by: FAMILY MEDICINE

## 2020-02-06 RX ORDER — IRBESARTAN AND HYDROCHLOROTHIAZIDE 300; 12.5 MG/1; MG/1
1 TABLET, FILM COATED ORAL DAILY
Qty: 90 TABLET | Refills: 0 | Status: SHIPPED | OUTPATIENT
Start: 2020-02-06 | End: 2020-03-19

## 2020-02-06 RX ORDER — METFORMIN HYDROCHLORIDE 1000 MG/1
1 TABLET ORAL 2 TIMES DAILY
COMMUNITY
Start: 2020-02-05 | End: 2020-02-06 | Stop reason: SDUPTHER

## 2020-02-06 RX ORDER — INSULIN GLARGINE 100 [IU]/ML
20 INJECTION, SOLUTION SUBCUTANEOUS EVERY MORNING
Qty: 15 ML | Refills: 1 | Status: SHIPPED | OUTPATIENT
Start: 2020-02-06 | End: 2020-04-15 | Stop reason: SDUPTHER

## 2020-02-06 RX ORDER — ROSUVASTATIN CALCIUM 10 MG/1
1 TABLET, FILM COATED ORAL DAILY
COMMUNITY
Start: 2019-12-04 | End: 2020-02-06 | Stop reason: SDUPTHER

## 2020-02-06 RX ORDER — INSULIN GLARGINE 100 [IU]/ML
30 INJECTION, SOLUTION SUBCUTANEOUS DAILY
COMMUNITY
Start: 2020-01-13 | End: 2020-02-06 | Stop reason: SDUPTHER

## 2020-02-06 RX ORDER — DULAGLUTIDE 0.75 MG/.5ML
0.75 INJECTION, SOLUTION SUBCUTANEOUS WEEKLY
COMMUNITY
Start: 2020-02-04 | End: 2020-02-06 | Stop reason: SDUPTHER

## 2020-02-06 RX ORDER — METFORMIN HYDROCHLORIDE 1000 MG/1
1000 TABLET ORAL 2 TIMES DAILY WITH MEALS
Qty: 60 TABLET | Refills: 3 | Status: SHIPPED | OUTPATIENT
Start: 2020-02-06 | End: 2020-12-07

## 2020-02-06 RX ORDER — CARVEDILOL 12.5 MG/1
1 TABLET ORAL 2 TIMES DAILY
COMMUNITY
Start: 2020-01-27 | End: 2020-02-06

## 2020-02-06 RX ORDER — LATANOPROST 50 UG/ML
1 SOLUTION/ DROPS OPHTHALMIC NIGHTLY
COMMUNITY
Start: 2020-01-22 | End: 2020-05-15 | Stop reason: SDUPTHER

## 2020-02-09 NOTE — PROGRESS NOTES
Subjective:       Patient ID: Nohelia Rodriguez is a 59 y.o. female.    Chief Complaint: Follow-up    HPI   The patient is a 59-year-old who is here today to follow-up on her diabetes.  Since I have seen her last, she has been making a concerted effort to lose weight.  In November she weighed 224 lb.  In December she weighed 219 lb.  Today she weighs 212 lb.  She has been going to the gym doing cardio and working with a .  She has a goal of getting down to 160 lb.  At 1 point in her life she did weigh 400 lb and so she knows that she can lose weight.  Her recent A1c was 7.0 down from prior value of 8.6.  She is currently taking Lantus 30 units at night, Trulicity 0.5 mg weekly and Glucophage 1000 mg twice a day.  She has not had any hypoglycemic episodes.    Review of Systems   Constitutional: Negative for appetite change, chills, diaphoresis, fatigue, fever and unexpected weight change.   HENT: Negative for congestion, ear pain, postnasal drip, rhinorrhea, sinus pressure, sneezing, sore throat and trouble swallowing.    Eyes: Negative for pain, discharge and visual disturbance.   Respiratory: Negative for cough, chest tightness, shortness of breath and wheezing.    Cardiovascular: Negative for chest pain, palpitations and leg swelling.   Gastrointestinal: Negative for abdominal distention, abdominal pain, blood in stool, constipation, diarrhea, nausea and vomiting.   Skin: Negative for rash.       Objective:      Physical Exam   Constitutional: She is oriented to person, place, and time. She appears well-developed and well-nourished. No distress.   HENT:   Head: Normocephalic and atraumatic.   Right Ear: Hearing, tympanic membrane, external ear and ear canal normal.   Left Ear: Hearing, tympanic membrane, external ear and ear canal normal.   Nose: Nose normal.   Mouth/Throat: Oropharynx is clear and moist and mucous membranes are normal. No oral lesions. No oropharyngeal exudate, posterior oropharyngeal edema or  "posterior oropharyngeal erythema.   Eyes: Pupils are equal, round, and reactive to light. Conjunctivae, EOM and lids are normal. No scleral icterus.   Neck: Normal range of motion. Neck supple. Carotid bruit is not present. No thyroid mass and no thyromegaly present.   Cardiovascular: Normal rate, regular rhythm and normal heart sounds.  No extrasystoles are present. PMI is not displaced. Exam reveals no gallop.   No murmur heard.  Pulmonary/Chest: Effort normal and breath sounds normal. No accessory muscle usage. No respiratory distress.   Clear to auscultation bilaterally.   Abdominal: Soft. Normal appearance and bowel sounds are normal. She exhibits no abdominal bruit. There is no hepatosplenomegaly. There is no tenderness. There is no rebound.   Lymphadenopathy:        Head (right side): No submental and no submandibular adenopathy present.        Head (left side): No submental and no submandibular adenopathy present.        Right cervical: No superficial cervical, no deep cervical and no posterior cervical adenopathy present.       Left cervical: No superficial cervical, no deep cervical and no posterior cervical adenopathy present.        Right: No supraclavicular adenopathy present.        Left: No supraclavicular adenopathy present.   Neurological: She is alert and oriented to person, place, and time.   Skin: Skin is warm, dry and intact.   Psychiatric: She has a normal mood and affect.     Blood pressure (!) 152/96, pulse 85, temperature 98.7 °F (37.1 °C), temperature source Oral, resp. rate 16, height 5' 9" (1.753 m), weight 96.3 kg (212 lb 4.9 oz), SpO2 98 %.Body mass index is 31.35 kg/m².          A/P:  1) diabetes.  Improved control.  Given her weight loss, we are going to make adjustments in her medication.  She will continue with the Glucophage 1000 mg twice a day.  We will decrease the Lantus from 30 units to 20 units.  For now she will continue with her current Trulicity supply of 0.75  mg once a " week.  In the near future, we will decrease Lantus further and increase her Trulicity with a goal of trying to stop the Lantus completely.  I am going to see her back in 2-3 weeks for short-term follow-up on her diabetes or sooner if needed.  If her fasting sugars are consistently outside of the range of , she will let me know  2) Hypertension.  Uncertain control.  For now will continue with her current medication especially with the weight loss she has been having.  We will reassess this at her next visit and make further adjustments at that time if needed

## 2020-02-14 ENCOUNTER — TELEPHONE (OUTPATIENT)
Dept: FAMILY MEDICINE | Facility: CLINIC | Age: 60
End: 2020-02-14

## 2020-02-17 NOTE — TELEPHONE ENCOUNTER
----- Message from Yajaira Roberson sent at 2/17/2020  3:40 PM CST -----  Type:  Patient Returning Call    Who Called:  Patient   Who Left Message for Patient:  Shereen Orlando  Does the patient know what this is regarding?:  Previous message  Best Call Back Number:  [phone]     Additional Information:

## 2020-02-17 NOTE — TELEPHONE ENCOUNTER
This is available with [facility]. LM for patient to return call to clinic for permission to send RX to [facility]

## 2020-03-01 RX ORDER — CARVEDILOL 12.5 MG/1
TABLET ORAL
Qty: 60 TABLET | Refills: 2 | Status: SHIPPED | OUTPATIENT
Start: 2020-03-01 | End: 2020-06-01

## 2020-03-19 RX ORDER — IRBESARTAN AND HYDROCHLOROTHIAZIDE 300; 12.5 MG/1; MG/1
1 TABLET, FILM COATED ORAL DAILY
Qty: 90 TABLET | Refills: 0 | Status: SHIPPED | OUTPATIENT
Start: 2020-03-19 | End: 2020-08-08

## 2020-03-23 ENCOUNTER — TELEPHONE (OUTPATIENT)
Dept: FAMILY MEDICINE | Facility: CLINIC | Age: 60
End: 2020-03-23

## 2020-03-23 NOTE — TELEPHONE ENCOUNTER
----- Message from Silvia Joseph sent at 3/23/2020 12:25 PM CDT -----    Type: Needs Medical Advice    Who Called:  pt  Best Call Back Number:[phone]   Cell   Please call  Second  Request pt is  At  Wk   today  Additional Information:   Pt  Is calling to  Address  If  She  Should  Be  Out in public   because  Of her diabetes ? Please call to  Discuss

## 2020-03-23 NOTE — TELEPHONE ENCOUNTER
Returned patient's call, states she was concerned about working at [employer] as she is a diabetic. Advised patient it is recommended she follow hand washing, social distancing, and PPE protocol if necessary but if believed to have come in contact with anyone who was either confirmed/suspected to have COVID-19, develops symptoms, etc to return call to clinic. Patient verbalized understanding.

## 2020-03-23 NOTE — TELEPHONE ENCOUNTER
----- Message from Yajaira Pizarro sent at 3/23/2020  8:23 AM CDT -----  Type: Needs Medical Advice    Who Called:  Patient  Best Call Back Number: [phone]  Additional Information: Diabetic patient requesting to speak with nurse concerning her employment and being around a lot of people/needs advice/please call back to advise.

## 2020-03-26 ENCOUNTER — TELEPHONE (OUTPATIENT)
Dept: FAMILY MEDICINE | Facility: CLINIC | Age: 60
End: 2020-03-26

## 2020-03-26 NOTE — TELEPHONE ENCOUNTER
----- Message from Padmini Radford sent at 3/26/2020  4:26 PM CDT -----  Type: Needs Medical Advice    Who Called:  patient  Symptoms (please be specific):  dizziness  Best Call Back Number: [phone]  Additional Information:states that today her BP reading 150/92. Glucose reading 155. Please give call back to advise

## 2020-03-26 NOTE — TELEPHONE ENCOUNTER
Returned patient's call, states she began experiencing dizziness today (climbing ladders at work), states she checked BP and reading was 150/92, glucose was 155 (not fasting). Patient states she rested and symptoms subsided. Patient also advised she forgot to take her BP medication this morning and reports she is not currently experiencing any symptoms, but requested to notify PCP.

## 2020-03-27 NOTE — TELEPHONE ENCOUNTER
Noted  Take meds consistently and let me know if this happens again or if BP are higher than 135/85 on meds

## 2020-04-16 RX ORDER — INSULIN GLARGINE 100 [IU]/ML
20 INJECTION, SOLUTION SUBCUTANEOUS EVERY MORNING
Qty: 15 ML | Refills: 0 | Status: SHIPPED | OUTPATIENT
Start: 2020-04-16 | End: 2020-10-28

## 2020-05-13 ENCOUNTER — PATIENT OUTREACH (OUTPATIENT)
Dept: ADMINISTRATIVE | Facility: HOSPITAL | Age: 60
End: 2020-05-13

## 2020-05-13 NOTE — PROGRESS NOTES
Chart review completed 05/13/2020.  Care Everywhere updates requested and reviewed.  Immunizations reconciled. Media reviewed.     , [employer], and [facility] reviewed

## 2020-05-15 ENCOUNTER — OFFICE VISIT (OUTPATIENT)
Dept: FAMILY MEDICINE | Facility: CLINIC | Age: 60
End: 2020-05-15
Payer: COMMERCIAL

## 2020-05-15 VITALS
OXYGEN SATURATION: 97 % | HEIGHT: 69 IN | WEIGHT: 208 LBS | SYSTOLIC BLOOD PRESSURE: 120 MMHG | BODY MASS INDEX: 30.81 KG/M2 | HEART RATE: 89 BPM | TEMPERATURE: 99 F | DIASTOLIC BLOOD PRESSURE: 70 MMHG | RESPIRATION RATE: 18 BRPM

## 2020-05-15 DIAGNOSIS — I15.2 HYPERTENSION ASSOCIATED WITH DIABETES: ICD-10-CM

## 2020-05-15 DIAGNOSIS — E11.8 DIABETES MELLITUS TYPE 2 WITH COMPLICATIONS: Primary | ICD-10-CM

## 2020-05-15 DIAGNOSIS — E11.59 HYPERTENSION ASSOCIATED WITH DIABETES: ICD-10-CM

## 2020-05-15 DIAGNOSIS — E78.5 HYPERLIPIDEMIA ASSOCIATED WITH TYPE 2 DIABETES MELLITUS: ICD-10-CM

## 2020-05-15 DIAGNOSIS — Z11.4 SCREENING FOR HIV WITHOUT PRESENCE OF RISK FACTORS: ICD-10-CM

## 2020-05-15 DIAGNOSIS — E11.69 HYPERLIPIDEMIA ASSOCIATED WITH TYPE 2 DIABETES MELLITUS: ICD-10-CM

## 2020-05-15 LAB
ALBUMIN SERPL BCP-MCNC: 3.9 G/DL (ref 3.5–5.2)
ALBUMIN/CREAT UR: 6.6 UG/MG (ref 0–30)
ALP SERPL-CCNC: 53 U/L (ref 55–135)
ALT SERPL W/O P-5'-P-CCNC: 7 U/L (ref 10–44)
ANION GAP SERPL CALC-SCNC: 9 MMOL/L (ref 8–16)
AST SERPL-CCNC: 20 U/L (ref 10–40)
BILIRUB SERPL-MCNC: 0.3 MG/DL (ref 0.1–1)
BUN SERPL-MCNC: 18 MG/DL (ref 6–20)
CALCIUM SERPL-MCNC: 10 MG/DL (ref 8.7–10.5)
CHLORIDE SERPL-SCNC: 103 MMOL/L (ref 95–110)
CHOLEST SERPL-MCNC: 112 MG/DL (ref 120–199)
CHOLEST/HDLC SERPL: 2.6 {RATIO} (ref 2–5)
CO2 SERPL-SCNC: 29 MMOL/L (ref 23–29)
CREAT SERPL-MCNC: 1.1 MG/DL (ref 0.5–1.4)
CREAT UR-MCNC: 136 MG/DL (ref 15–325)
EST. GFR  (AFRICAN AMERICAN): >60 ML/MIN/1.73 M^2
EST. GFR  (NON AFRICAN AMERICAN): 54.7 ML/MIN/1.73 M^2
ESTIMATED AVG GLUCOSE: 163 MG/DL (ref 68–131)
GLUCOSE SERPL-MCNC: 86 MG/DL (ref 70–110)
HBA1C MFR BLD HPLC: 7.3 % (ref 4–5.6)
HDLC SERPL-MCNC: 43 MG/DL (ref 40–75)
HDLC SERPL: 38.4 % (ref 20–50)
LDLC SERPL CALC-MCNC: 54.2 MG/DL (ref 63–159)
MICROALBUMIN UR DL<=1MG/L-MCNC: 9 UG/ML
NONHDLC SERPL-MCNC: 69 MG/DL
POTASSIUM SERPL-SCNC: 4.2 MMOL/L (ref 3.5–5.1)
PROT SERPL-MCNC: 7.6 G/DL (ref 6–8.4)
SODIUM SERPL-SCNC: 141 MMOL/L (ref 136–145)
TRIGL SERPL-MCNC: 74 MG/DL (ref 30–150)

## 2020-05-15 PROCEDURE — 82043 UR ALBUMIN QUANTITATIVE: CPT

## 2020-05-15 PROCEDURE — 80061 LIPID PANEL: CPT

## 2020-05-15 PROCEDURE — 36415 COLL VENOUS BLD VENIPUNCTURE: CPT | Mod: S$GLB,,, | Performed by: FAMILY MEDICINE

## 2020-05-15 PROCEDURE — 36415 PR COLLECTION VENOUS BLOOD,VENIPUNCTURE: ICD-10-PCS | Mod: S$GLB,,, | Performed by: FAMILY MEDICINE

## 2020-05-15 PROCEDURE — 86703 HIV-1/HIV-2 1 RESULT ANTBDY: CPT

## 2020-05-15 PROCEDURE — 99214 PR OFFICE/OUTPT VISIT, EST, LEVL IV, 30-39 MIN: ICD-10-PCS | Mod: S$GLB,,, | Performed by: FAMILY MEDICINE

## 2020-05-15 PROCEDURE — 80053 COMPREHEN METABOLIC PANEL: CPT

## 2020-05-15 PROCEDURE — 99214 OFFICE O/P EST MOD 30 MIN: CPT | Mod: S$GLB,,, | Performed by: FAMILY MEDICINE

## 2020-05-15 PROCEDURE — 83036 HEMOGLOBIN GLYCOSYLATED A1C: CPT

## 2020-05-17 ENCOUNTER — TELEPHONE (OUTPATIENT)
Dept: FAMILY MEDICINE | Facility: CLINIC | Age: 60
End: 2020-05-17

## 2020-05-17 NOTE — PROGRESS NOTES
Subjective:       Patient ID: Nohelia Rodriguez is a 60 y.o. female.    Chief Complaint: Follow-up    HPI   The patient is a 60-year-old who is here today for chronic follow-up.  Since I have seen her last, she has continued to do well.  She is continuing to lose weight.  She is exercising regularly by doing [employer] workouts.  She is eating healthy.  Regarding her weight loss progress, she is loving it.  At 1 point she weighed over 400 lb.  She is losing inches and her pants size has significantly decreased.  Her weight loss goal is to get to 160 lb.      Today we discussed the followin)  Diabetes.  Her fasting sugar readings are usually in the 105-110 range.  Her lowest fasting sugar was 89 and her highest was 140.  She is currently taking Lantus 20 units , Glucophage 1000 mg twice a day and Trulicity 1.5 mg once a week.  She just started the higher dose of Trulicity 2 weeks ago because she had a supply of the lower dose of Trulicity that she wanted to use 1st.   2)   Hypertension.  Her blood pressure today is 120/70.  She is taking her Avalide which she is tolerating well    Review of Systems   Constitutional: Negative for appetite change, chills, diaphoresis, fatigue, fever and unexpected weight change.   HENT: Negative for congestion, ear pain, postnasal drip, rhinorrhea, sinus pressure, sneezing, sore throat and trouble swallowing.    Eyes: Negative for pain, discharge and visual disturbance.   Respiratory: Negative for cough, chest tightness, shortness of breath and wheezing.    Cardiovascular: Negative for chest pain, palpitations and leg swelling.   Gastrointestinal: Negative for abdominal distention, abdominal pain, blood in stool, constipation, diarrhea, nausea and vomiting.   Skin: Negative for rash.       Objective:      Physical Exam   Constitutional: She is oriented to person, place, and time. She appears well-developed and well-nourished. No distress.   HENT:   Head: Normocephalic and  atraumatic.   Right Ear: Hearing, tympanic membrane, external ear and ear canal normal.   Left Ear: Hearing, tympanic membrane, external ear and ear canal normal.   Nose: Nose normal.   Mouth/Throat: Oropharynx is clear and moist and mucous membranes are normal. No oral lesions. No oropharyngeal exudate, posterior oropharyngeal edema or posterior oropharyngeal erythema.   Eyes: Pupils are equal, round, and reactive to light. Conjunctivae, EOM and lids are normal. No scleral icterus.   Neck: Normal range of motion. Neck supple. Carotid bruit is not present. No thyroid mass and no thyromegaly present.   Cardiovascular: Normal rate, regular rhythm and normal heart sounds.  No extrasystoles are present. PMI is not displaced. Exam reveals no gallop.   No murmur heard.  Pulses:       Dorsalis pedis pulses are 2+ on the right side, and 2+ on the left side.        Posterior tibial pulses are 2+ on the right side, and 2+ on the left side.   Pulmonary/Chest: Effort normal and breath sounds normal. No accessory muscle usage. No respiratory distress.   Clear to auscultation bilaterally.   Abdominal: Soft. Normal appearance and bowel sounds are normal. She exhibits no abdominal bruit. There is no hepatosplenomegaly. There is no tenderness. There is no rebound.   Musculoskeletal:        Right foot: There is no deformity.        Left foot: There is no deformity.   Feet:   Right Foot:   Protective Sensation: 10 sites tested. 10 sites sensed.   Skin Integrity: Positive for warmth. Negative for ulcer or callus.   Left Foot:   Protective Sensation: 10 sites tested. 10 sites sensed.   Skin Integrity: Positive for warmth. Negative for ulcer or callus.   Lymphadenopathy:        Head (right side): No submental and no submandibular adenopathy present.        Head (left side): No submental and no submandibular adenopathy present.        Right cervical: No superficial cervical, no deep cervical and no posterior cervical adenopathy present.    "    Left cervical: No superficial cervical, no deep cervical and no posterior cervical adenopathy present.        Right: No supraclavicular adenopathy present.        Left: No supraclavicular adenopathy present.   Neurological: She is alert and oriented to person, place, and time.   Skin: Skin is warm, dry and intact.   Psychiatric: She has a normal mood and affect.     Blood pressure 120/70, pulse 89, temperature 99.2 °F (37.3 °C), resp. rate 18, height 5' 9" (1.753 m), weight 94.3 kg (208 lb 0.1 oz), SpO2 97 %.Body mass index is 30.72 kg/m².            A/P:  1) obesity with a BMI of 30.72.  I did congratulate her on the progress she has made.  She will continue her weight loss efforts  2) diabetes.  Well controlled based on home readings.  We will decrease her Lantus to 10 units.  She will continue with Trulicity and the Glucophage.  If her fasting sugars are higher than 140, she will let me know.  We will check an A1c and urine microalbumin today  3)  Hypertension.  Well controlled.  Continue with Avalide      4)  Hyperlipidemia.  Previously well controlled.  We will check a fasting lipid profile today.  She will continue with the Crestor  5) health maintenance issues.  We will check an HIV today      As long as she does well, I will see her back in 3 months with an A1c prior to that visit  "

## 2020-05-17 NOTE — TELEPHONE ENCOUNTER
Pls notify pt:    Your labs look good     Keep me posted on your diabetes as we decrease the lantus and see how the higher dose of trulicity works    If you have FBS consistently over 140, let me know

## 2020-05-18 LAB — HIV 1+2 AB+HIV1 P24 AG SERPL QL IA: NEGATIVE

## 2020-05-25 RX ORDER — DULAGLUTIDE 1.5 MG/.5ML
INJECTION, SOLUTION SUBCUTANEOUS
Qty: 4 ML | Refills: 2 | Status: SHIPPED | OUTPATIENT
Start: 2020-05-25 | End: 2020-08-20

## 2020-06-01 RX ORDER — CARVEDILOL 12.5 MG/1
TABLET ORAL
Qty: 60 TABLET | Refills: 3 | Status: SHIPPED | OUTPATIENT
Start: 2020-06-01 | End: 2020-10-02

## 2020-08-15 ENCOUNTER — LAB VISIT (OUTPATIENT)
Dept: LAB | Facility: HOSPITAL | Age: 60
End: 2020-08-15
Attending: FAMILY MEDICINE
Payer: COMMERCIAL

## 2020-08-15 DIAGNOSIS — E11.8 DIABETES MELLITUS TYPE 2 WITH COMPLICATIONS: ICD-10-CM

## 2020-08-15 LAB
ESTIMATED AVG GLUCOSE: 177 MG/DL (ref 68–131)
HBA1C MFR BLD HPLC: 7.8 % (ref 4–5.6)

## 2020-08-15 PROCEDURE — 36415 COLL VENOUS BLD VENIPUNCTURE: CPT | Mod: PO

## 2020-08-15 PROCEDURE — 83036 HEMOGLOBIN GLYCOSYLATED A1C: CPT

## 2020-08-18 ENCOUNTER — OFFICE VISIT (OUTPATIENT)
Dept: FAMILY MEDICINE | Facility: CLINIC | Age: 60
End: 2020-08-18
Payer: COMMERCIAL

## 2020-08-18 VITALS
OXYGEN SATURATION: 98 % | BODY MASS INDEX: 30.7 KG/M2 | WEIGHT: 207.25 LBS | TEMPERATURE: 98 F | SYSTOLIC BLOOD PRESSURE: 120 MMHG | DIASTOLIC BLOOD PRESSURE: 60 MMHG | HEART RATE: 87 BPM | HEIGHT: 69 IN

## 2020-08-18 DIAGNOSIS — E11.8 DIABETES MELLITUS TYPE 2 WITH COMPLICATIONS: Primary | ICD-10-CM

## 2020-08-18 PROCEDURE — 99213 PR OFFICE/OUTPT VISIT, EST, LEVL III, 20-29 MIN: ICD-10-PCS | Mod: S$GLB,,, | Performed by: FAMILY MEDICINE

## 2020-08-18 PROCEDURE — 99213 OFFICE O/P EST LOW 20 MIN: CPT | Mod: S$GLB,,, | Performed by: FAMILY MEDICINE

## 2020-08-18 NOTE — PATIENT INSTRUCTIONS
Please have your eye doc fax me the last eye note ()    Please check your sugars before lunch and dinner and send me those readings in 2 weeks

## 2020-08-18 NOTE — TELEPHONE ENCOUNTER
----- Message from Conchita Hopper sent at 8/18/2020  3:43 PM CDT -----  Type:  RX Refill Request    Who Called:  Patient  Refill or New Rx:  Refill  RX Name and Strength:  TRULICITY 1.5 mg/0.5 mL PnIj  How is the patient currently taking it? (ex. 1XDay):   INJECT 1.5 MG (0.5 ML) INTO THE SKIN EVERY 7 DAYS.  Is this a 30 day or 90 day RX:  4 ml  Preferred Pharmacy with phone number:    [facility] - [place], [place] - [address]  [address]  [facility] [address]  Phone: [phone] Fax: [phone]  Local or Mail Order:  local  Ordering Provider:  Katelyn Barrett MD  Best Call Back Number:  [phone]

## 2020-08-20 RX ORDER — DULAGLUTIDE 1.5 MG/.5ML
INJECTION, SOLUTION SUBCUTANEOUS
Qty: 4 ML | Refills: 3 | Status: SHIPPED | OUTPATIENT
Start: 2020-08-20 | End: 2020-12-08

## 2020-08-22 NOTE — PROGRESS NOTES
Subjective:       Patient ID: Nohelia Rodriguez is a 60 y.o. female.    Chief Complaint: Follow-up (routine 3 month follow up )    HPI   The patient is a 60-year-old who is here today for her 3 month follow-up.  Her recent A1c was 7.8 up from previous value of 7.3 which was up to over from prior value of 7.0.  She is not happy about her increasing A1c.  She is eating healthy.  She is eating light portion sizes.  She is enjoying steamed vegetables, grilled foods and salads.  She is currently taking Lantus 10 units, Trulicity 1.5 mg and Glucophage 1000 mg twice a day.  Her fasting sugars have been in the  80s to 90s with the highest fasting sugar she has seen at 116.  Her weight loss has plateaued but she knows it is going to start up again.      Review of Systems   Constitutional: Negative for appetite change, chills, diaphoresis, fatigue, fever and unexpected weight change.   HENT: Negative for congestion, ear pain, postnasal drip, rhinorrhea, sinus pressure, sneezing, sore throat and trouble swallowing.    Eyes: Negative for pain, discharge and visual disturbance.   Respiratory: Negative for cough, chest tightness, shortness of breath and wheezing.    Cardiovascular: Negative for chest pain, palpitations and leg swelling.   Gastrointestinal: Negative for abdominal distention, abdominal pain, blood in stool, constipation, diarrhea, nausea and vomiting.   Skin: Negative for rash.       Objective:      Physical Exam  Constitutional:       General: She is not in acute distress.     Appearance: Normal appearance. She is well-developed.   HENT:      Head: Normocephalic and atraumatic.      Right Ear: Hearing, tympanic membrane, ear canal and external ear normal.      Left Ear: Hearing, tympanic membrane, ear canal and external ear normal.      Nose: Nose normal.      Mouth/Throat:      Mouth: No oral lesions.      Pharynx: No oropharyngeal exudate or posterior oropharyngeal erythema.   Eyes:      General: Lids are normal. No  "scleral icterus.     Extraocular Movements: Extraocular movements intact.      Conjunctiva/sclera: Conjunctivae normal.      Pupils: Pupils are equal, round, and reactive to light.   Neck:      Musculoskeletal: Normal range of motion and neck supple.      Thyroid: No thyroid mass or thyromegaly.      Vascular: No carotid bruit.   Cardiovascular:      Rate and Rhythm: Normal rate and regular rhythm.  No extrasystoles are present.     Chest Wall: PMI is not displaced.      Heart sounds: Normal heart sounds. No murmur. No gallop.    Pulmonary:      Effort: Pulmonary effort is normal. No accessory muscle usage or respiratory distress.      Breath sounds: Normal breath sounds.   Abdominal:      General: Bowel sounds are normal. There is no abdominal bruit.      Palpations: Abdomen is soft.      Tenderness: There is no abdominal tenderness. There is no rebound.   Lymphadenopathy:      Head:      Right side of head: No submental or submandibular adenopathy.      Left side of head: No submental or submandibular adenopathy.      Cervical:      Right cervical: No superficial, deep or posterior cervical adenopathy.     Left cervical: No superficial, deep or posterior cervical adenopathy.      Upper Body:      Right upper body: No supraclavicular adenopathy.      Left upper body: No supraclavicular adenopathy.   Skin:     General: Skin is warm and dry.   Neurological:      Mental Status: She is alert and oriented to person, place, and time.       Blood pressure 120/60, pulse 87, temperature 98 °F (36.7 °C), temperature source Temporal, height 5' 9" (1.753 m), weight 94 kg (207 lb 3.7 oz), SpO2 98 %.Body mass index is 30.6 kg/m².          A/P:  1) diabetes.  Uncontrolled based on A1c despite normal fasting sugars.  She is going to vary the time she checks her sugars and provide me with some prelunch and predinner readings.  Once I have these readings back, we will decide how to proceed.  For now she will continue with her " current medications.  She will have her eye doctor send me a copy of her recent eye exam

## 2020-09-24 ENCOUNTER — OFFICE VISIT (OUTPATIENT)
Dept: FAMILY MEDICINE | Facility: CLINIC | Age: 60
End: 2020-09-24
Payer: COMMERCIAL

## 2020-09-24 DIAGNOSIS — E11.8 DIABETES MELLITUS TYPE 2 WITH COMPLICATIONS: Primary | ICD-10-CM

## 2020-09-24 PROCEDURE — 99213 PR OFFICE/OUTPT VISIT, EST, LEVL III, 20-29 MIN: ICD-10-PCS | Mod: S$GLB,,, | Performed by: FAMILY MEDICINE

## 2020-09-24 PROCEDURE — 99213 OFFICE O/P EST LOW 20 MIN: CPT | Mod: S$GLB,,, | Performed by: FAMILY MEDICINE

## 2020-09-27 VITALS
HEIGHT: 69 IN | BODY MASS INDEX: 30.45 KG/M2 | HEART RATE: 88 BPM | SYSTOLIC BLOOD PRESSURE: 125 MMHG | RESPIRATION RATE: 18 BRPM | DIASTOLIC BLOOD PRESSURE: 85 MMHG | TEMPERATURE: 97 F | WEIGHT: 205.56 LBS

## 2020-09-27 NOTE — PROGRESS NOTES
Subjective:       Patient ID: Nohelia Rodriguez is a 60 y.o. female.    Chief Complaint: Diabetes (5 week follow up)    HPI   The patient is a 60-year-old who is here today for short-term follow-up on her diabetes.  She is feeling fine.  Her sugars have been good usually in the 120.  Her readings have ranged from  but the 188 was unusually high for her.  She denies any hypoglycemic episodes.  She is currently taking Lantus 10 units once a day, Trulicity 1.5 mg once a week and Glucophage 1000 mg twice a day.  Since her last visit, she has lost 2 lb.  She continues with her efforts at improving her diet.  At 1 point she weighed almost 400 lb and was size 4X for clothes and now she weighs 205 lb and is size 14.  She is determined to continue her weight loss.  She feels so much better at a lower weight.  She is hoping to get back to gym next week as she has not been going to the gym with the COVID pandemic.    Her blood pressure is a bit high today because she has leak in taking her blood pressure medicine.  At home her readings have been good    Review of Systems   Constitutional: Negative for appetite change, chills, diaphoresis, fatigue, fever and unexpected weight change.   HENT: Negative for congestion, ear pain, postnasal drip, rhinorrhea, sinus pressure, sneezing, sore throat and trouble swallowing.    Eyes: Negative for pain, discharge and visual disturbance.   Respiratory: Negative for cough, chest tightness, shortness of breath and wheezing.    Cardiovascular: Negative for chest pain, palpitations and leg swelling.   Gastrointestinal: Negative for abdominal distention, abdominal pain, blood in stool, constipation, diarrhea, nausea and vomiting.   Skin: Negative for rash.       Objective:      Physical Exam  Constitutional:       General: She is not in acute distress.     Appearance: Normal appearance. She is well-developed.   HENT:      Head: Normocephalic and atraumatic.      Right Ear: Hearing, tympanic  "membrane, ear canal and external ear normal.      Left Ear: Hearing, tympanic membrane, ear canal and external ear normal.      Nose: Nose normal.      Mouth/Throat:      Mouth: No oral lesions.      Pharynx: No oropharyngeal exudate or posterior oropharyngeal erythema.   Eyes:      General: Lids are normal. No scleral icterus.     Extraocular Movements: Extraocular movements intact.      Conjunctiva/sclera: Conjunctivae normal.      Pupils: Pupils are equal, round, and reactive to light.   Neck:      Musculoskeletal: Normal range of motion and neck supple.      Thyroid: No thyroid mass or thyromegaly.      Vascular: No carotid bruit.   Cardiovascular:      Rate and Rhythm: Normal rate and regular rhythm.  No extrasystoles are present.     Chest Wall: PMI is not displaced.      Heart sounds: Normal heart sounds. No murmur. No gallop.    Pulmonary:      Effort: Pulmonary effort is normal. No accessory muscle usage or respiratory distress.      Breath sounds: Normal breath sounds.   Abdominal:      General: Bowel sounds are normal. There is no abdominal bruit.      Palpations: Abdomen is soft.      Tenderness: There is no abdominal tenderness. There is no rebound.   Lymphadenopathy:      Head:      Right side of head: No submental or submandibular adenopathy.      Left side of head: No submental or submandibular adenopathy.      Cervical:      Right cervical: No superficial, deep or posterior cervical adenopathy.     Left cervical: No superficial, deep or posterior cervical adenopathy.      Upper Body:      Right upper body: No supraclavicular adenopathy.      Left upper body: No supraclavicular adenopathy.   Skin:     General: Skin is warm and dry.   Neurological:      Mental Status: She is alert and oriented to person, place, and time.       Blood pressure (!) 166/80, pulse 88, temperature 97.2 °F (36.2 °C), temperature source Temporal, resp. rate 18, height 5' 9" (1.753 m), weight 93.2 kg (205 lb 9.3 oz).Body mass " index is 30.36 kg/m².            A/P:  1) diabetes.  Well controlled based on home readings.  We will check an A1c in 3 months and I will see her back at that time.  If fasting sugars are consistently higher than 140 or if she has any hypoglycemic episodes, she will let me know.  For now she will continue with current medications.  She will continue to pursue weight loss and get back to the gym as planned.  She will send me her eye report when her eye doctor office reopens after a remodeling process

## 2020-11-13 ENCOUNTER — PATIENT OUTREACH (OUTPATIENT)
Dept: ADMINISTRATIVE | Facility: OTHER | Age: 60
End: 2020-11-13

## 2020-11-16 ENCOUNTER — OFFICE VISIT (OUTPATIENT)
Dept: OPHTHALMOLOGY | Facility: CLINIC | Age: 60
End: 2020-11-16
Payer: COMMERCIAL

## 2020-11-16 DIAGNOSIS — Z79.4 CONTROLLED TYPE 2 DIABETES MELLITUS WITH BOTH EYES AFFECTED BY PROLIFERATIVE RETINOPATHY AND MACULAR EDEMA, WITH LONG-TERM CURRENT USE OF INSULIN: Primary | ICD-10-CM

## 2020-11-16 DIAGNOSIS — H35.012 RETINAL MACROANEURYSM, LEFT EYE: ICD-10-CM

## 2020-11-16 DIAGNOSIS — E11.3513 CONTROLLED TYPE 2 DIABETES MELLITUS WITH BOTH EYES AFFECTED BY PROLIFERATIVE RETINOPATHY AND MACULAR EDEMA, WITH LONG-TERM CURRENT USE OF INSULIN: Primary | ICD-10-CM

## 2020-11-16 PROCEDURE — 99999 PR PBB SHADOW E&M-EST. PATIENT-LVL III: CPT | Mod: PBBFAC,,, | Performed by: OPHTHALMOLOGY

## 2020-11-16 PROCEDURE — 99999 PR PBB SHADOW E&M-EST. PATIENT-LVL III: ICD-10-PCS | Mod: PBBFAC,,, | Performed by: OPHTHALMOLOGY

## 2020-11-16 PROCEDURE — 92014 COMPRE OPH EXAM EST PT 1/>: CPT | Mod: S$GLB,,, | Performed by: OPHTHALMOLOGY

## 2020-11-16 PROCEDURE — 92134 CPTRZ OPH DX IMG PST SGM RTA: CPT | Mod: S$GLB,,, | Performed by: OPHTHALMOLOGY

## 2020-11-16 PROCEDURE — 92202 OPSCPY EXTND ON/MAC DRAW: CPT | Mod: S$GLB,,, | Performed by: OPHTHALMOLOGY

## 2020-11-16 PROCEDURE — 92134 POSTERIOR SEGMENT OCT RETINA (OCULAR COHERENCE TOMOGRAPHY)-BOTH EYES: ICD-10-PCS | Mod: S$GLB,,, | Performed by: OPHTHALMOLOGY

## 2020-11-16 PROCEDURE — 92202 PR OPHTHALMOSCOPY, EXT, W/DRAW OPTIC NERVE/MACULA, I&R, UNI/BI: ICD-10-PCS | Mod: S$GLB,,, | Performed by: OPHTHALMOLOGY

## 2020-11-16 PROCEDURE — 92014 PR EYE EXAM, EST PATIENT,COMPREHESV: ICD-10-PCS | Mod: S$GLB,,, | Performed by: OPHTHALMOLOGY

## 2020-11-16 RX ORDER — FLUORESCEIN 500 MG/ML
5 INJECTION INTRAVENOUS ONCE
Status: COMPLETED | OUTPATIENT
Start: 2020-11-16 | End: 2021-12-20

## 2020-11-16 NOTE — PROGRESS NOTES
HPI     Overdue check  DLS: 08/19//2019 Dr. Herrera    Hemoglobin A1C       Date                     Value               Ref Range             Status                08/15/2020               7.8 (H)             4.0 - 5.6 %           Final                 05/15/2020               7.3 (H)             4.0 - 5.6 %           Final                 02/01/2020               7.0 (H)             4.0 - 5.6 %           Final            ----------  Patient states her vision has been stable since her last visit. Denies   flashes, floaters, diplopia, photophobia, glare, HA's, or pain.    Latanoprost QHS OU       OCT - OD - no ME  OS - temporal ME a/w EMILE    Prior FA - late macular leakage OS  NO NV OU      A/P    1. PDR OU  S/p PRP OD with PSO'S  ?not high risk OS - will follow up FA    2. DME OS -   A/w EMILE  S/p multiple injections with PSO'S  S/p Avastin OS x 3 with me  S/p Focal OS 5/18    Improving nicely    3. HTN Ret OU  BS/BP/chol control    4. NS OU  No VS      11 months OCT/FA OS

## 2020-11-21 ENCOUNTER — LAB VISIT (OUTPATIENT)
Dept: LAB | Facility: HOSPITAL | Age: 60
End: 2020-11-21
Attending: FAMILY MEDICINE
Payer: COMMERCIAL

## 2020-11-21 DIAGNOSIS — E11.8 DIABETES MELLITUS TYPE 2 WITH COMPLICATIONS: ICD-10-CM

## 2020-11-21 LAB
ESTIMATED AVG GLUCOSE: 166 MG/DL (ref 68–131)
HBA1C MFR BLD HPLC: 7.4 % (ref 4–5.6)

## 2020-11-21 PROCEDURE — 36415 COLL VENOUS BLD VENIPUNCTURE: CPT | Mod: PO

## 2020-11-21 PROCEDURE — 83036 HEMOGLOBIN GLYCOSYLATED A1C: CPT

## 2020-11-24 ENCOUNTER — TELEPHONE (OUTPATIENT)
Dept: FAMILY MEDICINE | Facility: CLINIC | Age: 60
End: 2020-11-24

## 2020-11-24 NOTE — TELEPHONE ENCOUNTER
Pls notify pt:    Your A1c shows your diabetes is improving!  Keep up the good work!  Let's continue your medicine as is    If your fasting sugars are higher than 140, let me know

## 2020-12-10 ENCOUNTER — OFFICE VISIT (OUTPATIENT)
Dept: FAMILY MEDICINE | Facility: CLINIC | Age: 60
End: 2020-12-10
Payer: COMMERCIAL

## 2020-12-10 VITALS
RESPIRATION RATE: 16 BRPM | SYSTOLIC BLOOD PRESSURE: 140 MMHG | WEIGHT: 206.56 LBS | TEMPERATURE: 98 F | OXYGEN SATURATION: 99 % | HEART RATE: 80 BPM | BODY MASS INDEX: 30.59 KG/M2 | HEIGHT: 69 IN | DIASTOLIC BLOOD PRESSURE: 88 MMHG

## 2020-12-10 DIAGNOSIS — E11.59 HYPERTENSION ASSOCIATED WITH DIABETES: ICD-10-CM

## 2020-12-10 DIAGNOSIS — E78.5 HYPERLIPIDEMIA ASSOCIATED WITH TYPE 2 DIABETES MELLITUS: ICD-10-CM

## 2020-12-10 DIAGNOSIS — E11.69 HYPERLIPIDEMIA ASSOCIATED WITH TYPE 2 DIABETES MELLITUS: ICD-10-CM

## 2020-12-10 DIAGNOSIS — E11.8 DIABETES MELLITUS TYPE 2 WITH COMPLICATIONS: Primary | ICD-10-CM

## 2020-12-10 DIAGNOSIS — I15.2 HYPERTENSION ASSOCIATED WITH DIABETES: ICD-10-CM

## 2020-12-10 PROCEDURE — 99214 PR OFFICE/OUTPT VISIT, EST, LEVL IV, 30-39 MIN: ICD-10-PCS | Mod: S$GLB,,, | Performed by: FAMILY MEDICINE

## 2020-12-10 PROCEDURE — 99214 OFFICE O/P EST MOD 30 MIN: CPT | Mod: S$GLB,,, | Performed by: FAMILY MEDICINE

## 2020-12-10 RX ORDER — CARVEDILOL 25 MG/1
25 TABLET ORAL 2 TIMES DAILY WITH MEALS
Qty: 60 TABLET | Refills: 1 | Status: SHIPPED | OUTPATIENT
Start: 2020-12-10 | End: 2021-01-25

## 2020-12-24 ENCOUNTER — PATIENT OUTREACH (OUTPATIENT)
Dept: ADMINISTRATIVE | Facility: HOSPITAL | Age: 60
End: 2020-12-24

## 2020-12-24 NOTE — PROGRESS NOTES
2020 Care Everywhere updates requested and reviewed.  Immunizations reconciled. Media reports reviewed.  Duplicate HM overrides and  orders removed.  Overdue HM topic chart audit and/or requested.  Overdue lab testing linked to upcoming lab appointments if applies.        There are no preventive care reminders to display for this patient.

## 2020-12-27 ENCOUNTER — TELEPHONE (OUTPATIENT)
Dept: FAMILY MEDICINE | Facility: CLINIC | Age: 60
End: 2020-12-27

## 2020-12-28 NOTE — PROGRESS NOTES
Subjective:       Patient ID: Nohelia Rodriguez is a 60 y.o. female.    Chief Complaint: Follow-up    HPI   The patient is a 60-year-old who is here today for her 3 month follow-up.  Today we discussed the followin)  Diabetes.  Her recent A1c was 7.4 down from a prior value of 7.8.  She is taking Trulicity Glucophage and Lantus which is tolerating well.  Her fasting sugars typically range between .  She has not been as physically active as she has in the past because the cold weather but she plans to increase her exercise levels.  She really would like her weight to get below 200  2) hypertension.  Her blood pressure today is 140/88.  He she is taking the Avalide 300/12.5 mg once a day and Coreg 12.5 mg twice a day    Review of Systems   Constitutional: Negative for appetite change, chills, diaphoresis, fatigue, fever and unexpected weight change.   HENT: Negative for congestion, ear pain, postnasal drip, rhinorrhea, sinus pressure, sneezing, sore throat and trouble swallowing.    Eyes: Negative for pain, discharge and visual disturbance.   Respiratory: Negative for cough, chest tightness, shortness of breath and wheezing.    Cardiovascular: Negative for chest pain, palpitations and leg swelling.   Gastrointestinal: Negative for abdominal distention, abdominal pain, blood in stool, constipation, diarrhea, nausea and vomiting.   Skin: Negative for rash.       Objective:      Physical Exam  Constitutional:       General: She is not in acute distress.     Appearance: Normal appearance. She is well-developed.   HENT:      Head: Normocephalic and atraumatic.      Right Ear: Hearing, tympanic membrane, ear canal and external ear normal.      Left Ear: Hearing, tympanic membrane, ear canal and external ear normal.      Nose: Nose normal.      Mouth/Throat:      Mouth: No oral lesions.      Pharynx: No oropharyngeal exudate or posterior oropharyngeal erythema.   Eyes:      General: Lids are normal. No scleral  "icterus.     Extraocular Movements: Extraocular movements intact.      Conjunctiva/sclera: Conjunctivae normal.      Pupils: Pupils are equal, round, and reactive to light.   Neck:      Musculoskeletal: Normal range of motion and neck supple.      Thyroid: No thyroid mass or thyromegaly.      Vascular: No carotid bruit.   Cardiovascular:      Rate and Rhythm: Normal rate and regular rhythm.  No extrasystoles are present.     Chest Wall: PMI is not displaced.      Heart sounds: Normal heart sounds. No murmur. No gallop.    Pulmonary:      Effort: Pulmonary effort is normal. No accessory muscle usage or respiratory distress.      Breath sounds: Normal breath sounds.   Abdominal:      General: Bowel sounds are normal. There is no abdominal bruit.      Palpations: Abdomen is soft.      Tenderness: There is no abdominal tenderness. There is no rebound.   Lymphadenopathy:      Head:      Right side of head: No submental or submandibular adenopathy.      Left side of head: No submental or submandibular adenopathy.      Cervical:      Right cervical: No superficial, deep or posterior cervical adenopathy.     Left cervical: No superficial, deep or posterior cervical adenopathy.      Upper Body:      Right upper body: No supraclavicular adenopathy.      Left upper body: No supraclavicular adenopathy.   Skin:     General: Skin is warm and dry.   Neurological:      Mental Status: She is alert and oriented to person, place, and time.       Blood pressure (!) 140/88, pulse 80, temperature 98.1 °F (36.7 °C), temperature source Temporal, resp. rate 16, height 5' 9" (1.753 m), weight 93.7 kg (206 lb 9.1 oz), SpO2 99 %.Body mass index is 30.51 kg/m².            A/P:  1) diabetes.  Improved control.  For now, she will continue with her current medication.  If her fasting sugars are consistently above 140, she will let me know.    2)  Hypertension.  Uncontrolled.  We are going to increase her Coreg to 25 mg twice a day.  She will " continue with the current dose of Avalide.  We will see her back in 2 weeks to recheck her blood pressure    3) hyperlipidemia.  Previously well controlled.  Continue with Crestor.  We will check fasting labs prior to her next visit    As long as her blood pressure improves, I will see her back in 4 months with labs prior to that visit

## 2021-01-25 RX ORDER — CARVEDILOL 25 MG/1
TABLET ORAL
Qty: 60 TABLET | Refills: 1 | Status: SHIPPED | OUTPATIENT
Start: 2021-01-25 | End: 2021-03-30

## 2021-01-29 ENCOUNTER — TELEPHONE (OUTPATIENT)
Dept: FAMILY MEDICINE | Facility: CLINIC | Age: 61
End: 2021-01-29

## 2021-01-29 DIAGNOSIS — Z12.31 ENCOUNTER FOR SCREENING MAMMOGRAM FOR MALIGNANT NEOPLASM OF BREAST: Primary | ICD-10-CM

## 2021-03-01 ENCOUNTER — HOSPITAL ENCOUNTER (OUTPATIENT)
Dept: RADIOLOGY | Facility: HOSPITAL | Age: 61
Discharge: HOME | End: 2021-03-01
Attending: FAMILY MEDICINE
Payer: COMMERCIAL

## 2021-03-01 DIAGNOSIS — Z12.31 ENCOUNTER FOR SCREENING MAMMOGRAM FOR MALIGNANT NEOPLASM OF BREAST: ICD-10-CM

## 2021-03-01 DIAGNOSIS — Z12.31 SCREENING MAMMOGRAM, ENCOUNTER FOR: ICD-10-CM

## 2021-03-01 PROCEDURE — 77063 BREAST TOMOSYNTHESIS BI: CPT | Mod: 26,,, | Performed by: RADIOLOGY

## 2021-03-01 PROCEDURE — 77067 MAMMO DIGITAL SCREENING BILAT WITH TOMO: ICD-10-PCS | Mod: 26,,, | Performed by: RADIOLOGY

## 2021-03-01 PROCEDURE — 77067 SCR MAMMO BI INCL CAD: CPT | Mod: TC,PO

## 2021-03-01 PROCEDURE — 77063 MAMMO DIGITAL SCREENING BILAT WITH TOMO: ICD-10-PCS | Mod: 26,,, | Performed by: RADIOLOGY

## 2021-03-01 PROCEDURE — 77067 SCR MAMMO BI INCL CAD: CPT | Mod: 26,,, | Performed by: RADIOLOGY

## 2021-04-01 ENCOUNTER — LAB VISIT (OUTPATIENT)
Dept: LAB | Facility: HOSPITAL | Age: 61
End: 2021-04-01
Attending: FAMILY MEDICINE
Payer: COMMERCIAL

## 2021-04-01 DIAGNOSIS — E11.69 HYPERLIPIDEMIA ASSOCIATED WITH TYPE 2 DIABETES MELLITUS: ICD-10-CM

## 2021-04-01 DIAGNOSIS — E78.5 HYPERLIPIDEMIA ASSOCIATED WITH TYPE 2 DIABETES MELLITUS: ICD-10-CM

## 2021-04-01 DIAGNOSIS — E11.59 HYPERTENSION ASSOCIATED WITH DIABETES: ICD-10-CM

## 2021-04-01 DIAGNOSIS — I15.2 HYPERTENSION ASSOCIATED WITH DIABETES: ICD-10-CM

## 2021-04-01 DIAGNOSIS — E11.8 DIABETES MELLITUS TYPE 2 WITH COMPLICATIONS: ICD-10-CM

## 2021-04-01 LAB
ALBUMIN SERPL BCP-MCNC: 4 G/DL (ref 3.5–5.2)
ALP SERPL-CCNC: 49 U/L (ref 55–135)
ALT SERPL W/O P-5'-P-CCNC: 10 U/L (ref 10–44)
ANION GAP SERPL CALC-SCNC: 10 MMOL/L (ref 8–16)
AST SERPL-CCNC: 18 U/L (ref 10–40)
BASOPHILS # BLD AUTO: 0.04 K/UL (ref 0–0.2)
BASOPHILS NFR BLD: 1.1 % (ref 0–1.9)
BILIRUB SERPL-MCNC: 0.4 MG/DL (ref 0.1–1)
BUN SERPL-MCNC: 23 MG/DL (ref 8–23)
CALCIUM SERPL-MCNC: 9.9 MG/DL (ref 8.7–10.5)
CHLORIDE SERPL-SCNC: 104 MMOL/L (ref 95–110)
CHOLEST SERPL-MCNC: 136 MG/DL (ref 120–199)
CHOLEST/HDLC SERPL: 2.8 {RATIO} (ref 2–5)
CO2 SERPL-SCNC: 28 MMOL/L (ref 23–29)
CREAT SERPL-MCNC: 0.9 MG/DL (ref 0.5–1.4)
DIFFERENTIAL METHOD: ABNORMAL
EOSINOPHIL # BLD AUTO: 0.1 K/UL (ref 0–0.5)
EOSINOPHIL NFR BLD: 3.8 % (ref 0–8)
ERYTHROCYTE [DISTWIDTH] IN BLOOD BY AUTOMATED COUNT: 13.9 % (ref 11.5–14.5)
EST. GFR  (AFRICAN AMERICAN): >60 ML/MIN/1.73 M^2
EST. GFR  (NON AFRICAN AMERICAN): >60 ML/MIN/1.73 M^2
ESTIMATED AVG GLUCOSE: 163 MG/DL (ref 68–131)
GLUCOSE SERPL-MCNC: 110 MG/DL (ref 70–110)
HBA1C MFR BLD: 7.3 % (ref 4–5.6)
HCT VFR BLD AUTO: 33.4 % (ref 37–48.5)
HDLC SERPL-MCNC: 49 MG/DL (ref 40–75)
HDLC SERPL: 36 % (ref 20–50)
HGB BLD-MCNC: 10.2 G/DL (ref 12–16)
IMM GRANULOCYTES # BLD AUTO: 0.01 K/UL (ref 0–0.04)
IMM GRANULOCYTES NFR BLD AUTO: 0.3 % (ref 0–0.5)
LDLC SERPL CALC-MCNC: 75.4 MG/DL (ref 63–159)
LYMPHOCYTES # BLD AUTO: 1.3 K/UL (ref 1–4.8)
LYMPHOCYTES NFR BLD: 34.9 % (ref 18–48)
MCH RBC QN AUTO: 27.6 PG (ref 27–31)
MCHC RBC AUTO-ENTMCNC: 30.5 G/DL (ref 32–36)
MCV RBC AUTO: 91 FL (ref 82–98)
MONOCYTES # BLD AUTO: 0.3 K/UL (ref 0.3–1)
MONOCYTES NFR BLD: 8.7 % (ref 4–15)
NEUTROPHILS # BLD AUTO: 1.9 K/UL (ref 1.8–7.7)
NEUTROPHILS NFR BLD: 51.2 % (ref 38–73)
NONHDLC SERPL-MCNC: 87 MG/DL
NRBC BLD-RTO: 0 /100 WBC
PLATELET # BLD AUTO: 274 K/UL (ref 150–450)
PMV BLD AUTO: 11.1 FL (ref 9.2–12.9)
POTASSIUM SERPL-SCNC: 3.5 MMOL/L (ref 3.5–5.1)
PROT SERPL-MCNC: 8 G/DL (ref 6–8.4)
RBC # BLD AUTO: 3.69 M/UL (ref 4–5.4)
SODIUM SERPL-SCNC: 142 MMOL/L (ref 136–145)
TRIGL SERPL-MCNC: 58 MG/DL (ref 30–150)
TSH SERPL DL<=0.005 MIU/L-ACNC: 0.5 UIU/ML (ref 0.4–4)
WBC # BLD AUTO: 3.67 K/UL (ref 3.9–12.7)

## 2021-04-01 PROCEDURE — 36415 COLL VENOUS BLD VENIPUNCTURE: CPT | Mod: PO | Performed by: FAMILY MEDICINE

## 2021-04-01 PROCEDURE — 85025 COMPLETE CBC W/AUTO DIFF WBC: CPT | Performed by: FAMILY MEDICINE

## 2021-04-01 PROCEDURE — 80061 LIPID PANEL: CPT | Performed by: FAMILY MEDICINE

## 2021-04-01 PROCEDURE — 84443 ASSAY THYROID STIM HORMONE: CPT | Performed by: FAMILY MEDICINE

## 2021-04-01 PROCEDURE — 83036 HEMOGLOBIN GLYCOSYLATED A1C: CPT | Performed by: FAMILY MEDICINE

## 2021-04-01 PROCEDURE — 80053 COMPREHEN METABOLIC PANEL: CPT | Performed by: FAMILY MEDICINE

## 2021-04-02 ENCOUNTER — TELEPHONE (OUTPATIENT)
Dept: FAMILY MEDICINE | Facility: CLINIC | Age: 61
End: 2021-04-02

## 2021-04-09 ENCOUNTER — OFFICE VISIT (OUTPATIENT)
Dept: FAMILY MEDICINE | Facility: CLINIC | Age: 61
End: 2021-04-09
Payer: COMMERCIAL

## 2021-04-09 VITALS
BODY MASS INDEX: 28.65 KG/M2 | HEART RATE: 90 BPM | RESPIRATION RATE: 16 BRPM | SYSTOLIC BLOOD PRESSURE: 116 MMHG | HEIGHT: 69 IN | WEIGHT: 193.44 LBS | DIASTOLIC BLOOD PRESSURE: 68 MMHG | OXYGEN SATURATION: 99 % | TEMPERATURE: 98 F

## 2021-04-09 DIAGNOSIS — E11.8 DIABETES MELLITUS TYPE 2 WITH COMPLICATIONS: ICD-10-CM

## 2021-04-09 DIAGNOSIS — E11.69 HYPERLIPIDEMIA ASSOCIATED WITH TYPE 2 DIABETES MELLITUS: ICD-10-CM

## 2021-04-09 DIAGNOSIS — I15.2 HYPERTENSION ASSOCIATED WITH DIABETES: ICD-10-CM

## 2021-04-09 DIAGNOSIS — E11.59 HYPERTENSION ASSOCIATED WITH DIABETES: ICD-10-CM

## 2021-04-09 DIAGNOSIS — E78.5 HYPERLIPIDEMIA ASSOCIATED WITH TYPE 2 DIABETES MELLITUS: ICD-10-CM

## 2021-04-09 DIAGNOSIS — Z00.00 ANNUAL PHYSICAL EXAM: ICD-10-CM

## 2021-04-09 DIAGNOSIS — L84 CORNS: Primary | ICD-10-CM

## 2021-04-09 DIAGNOSIS — Z12.4 SCREENING FOR CERVICAL CANCER: ICD-10-CM

## 2021-04-09 PROCEDURE — 88175 CYTOPATH C/V AUTO FLUID REDO: CPT | Performed by: FAMILY MEDICINE

## 2021-04-09 PROCEDURE — 99396 PREV VISIT EST AGE 40-64: CPT | Mod: S$GLB,,, | Performed by: FAMILY MEDICINE

## 2021-04-09 PROCEDURE — 99396 PR PREVENTIVE VISIT,EST,40-64: ICD-10-PCS | Mod: S$GLB,,, | Performed by: FAMILY MEDICINE

## 2021-04-15 LAB
FINAL PATHOLOGIC DIAGNOSIS: NORMAL
Lab: NORMAL

## 2021-04-17 ENCOUNTER — TELEPHONE (OUTPATIENT)
Dept: FAMILY MEDICINE | Facility: CLINIC | Age: 61
End: 2021-04-17

## 2021-05-10 ENCOUNTER — OFFICE VISIT (OUTPATIENT)
Dept: PODIATRY | Facility: CLINIC | Age: 61
End: 2021-05-10
Payer: COMMERCIAL

## 2021-05-10 VITALS — DIASTOLIC BLOOD PRESSURE: 88 MMHG | SYSTOLIC BLOOD PRESSURE: 189 MMHG | HEART RATE: 89 BPM

## 2021-05-10 DIAGNOSIS — L84 CORN OR CALLUS: ICD-10-CM

## 2021-05-10 DIAGNOSIS — E11.9 COMPREHENSIVE DIABETIC FOOT EXAMINATION, TYPE 2 DM, ENCOUNTER FOR: Primary | ICD-10-CM

## 2021-05-10 DIAGNOSIS — B35.1 ONYCHOMYCOSIS DUE TO DERMATOPHYTE: ICD-10-CM

## 2021-05-10 PROCEDURE — 99203 PR OFFICE/OUTPT VISIT, NEW, LEVL III, 30-44 MIN: ICD-10-PCS | Mod: S$GLB,,, | Performed by: PODIATRIST

## 2021-05-10 PROCEDURE — 99999 PR PBB SHADOW E&M-EST. PATIENT-LVL III: ICD-10-PCS | Mod: PBBFAC,,, | Performed by: PODIATRIST

## 2021-05-10 PROCEDURE — 99999 PR PBB SHADOW E&M-EST. PATIENT-LVL III: CPT | Mod: PBBFAC,,, | Performed by: PODIATRIST

## 2021-05-10 PROCEDURE — 99203 OFFICE O/P NEW LOW 30 MIN: CPT | Mod: S$GLB,,, | Performed by: PODIATRIST

## 2021-06-07 ENCOUNTER — TELEPHONE (OUTPATIENT)
Dept: FAMILY MEDICINE | Facility: CLINIC | Age: 61
End: 2021-06-07

## 2021-07-19 ENCOUNTER — LAB VISIT (OUTPATIENT)
Dept: LAB | Facility: HOSPITAL | Age: 61
End: 2021-07-19
Attending: FAMILY MEDICINE
Payer: COMMERCIAL

## 2021-07-19 DIAGNOSIS — E11.8 DIABETES MELLITUS TYPE 2 WITH COMPLICATIONS: ICD-10-CM

## 2021-07-19 PROCEDURE — 83036 HEMOGLOBIN GLYCOSYLATED A1C: CPT | Performed by: FAMILY MEDICINE

## 2021-07-19 PROCEDURE — 36415 COLL VENOUS BLD VENIPUNCTURE: CPT | Mod: PO | Performed by: FAMILY MEDICINE

## 2021-07-20 LAB
ESTIMATED AVG GLUCOSE: 192 MG/DL (ref 68–131)
HBA1C MFR BLD: 8.3 % (ref 4–5.6)

## 2021-07-22 ENCOUNTER — TELEPHONE (OUTPATIENT)
Dept: FAMILY MEDICINE | Facility: CLINIC | Age: 61
End: 2021-07-22

## 2021-07-26 RX ORDER — DULAGLUTIDE 3 MG/.5ML
3 INJECTION, SOLUTION SUBCUTANEOUS
Qty: 4 PEN | Refills: 2 | Status: SHIPPED | OUTPATIENT
Start: 2021-07-26 | End: 2021-10-23

## 2021-07-29 ENCOUNTER — TELEPHONE (OUTPATIENT)
Dept: FAMILY MEDICINE | Facility: CLINIC | Age: 61
End: 2021-07-29

## 2021-08-02 ENCOUNTER — OFFICE VISIT (OUTPATIENT)
Dept: FAMILY MEDICINE | Facility: CLINIC | Age: 61
End: 2021-08-02
Payer: COMMERCIAL

## 2021-08-02 VITALS
RESPIRATION RATE: 18 BRPM | HEART RATE: 83 BPM | DIASTOLIC BLOOD PRESSURE: 70 MMHG | SYSTOLIC BLOOD PRESSURE: 136 MMHG | HEIGHT: 69 IN | WEIGHT: 195.69 LBS | TEMPERATURE: 98 F | BODY MASS INDEX: 28.98 KG/M2

## 2021-08-02 DIAGNOSIS — E11.8 DIABETES MELLITUS TYPE 2 WITH COMPLICATIONS: Primary | ICD-10-CM

## 2021-08-02 PROCEDURE — 99213 PR OFFICE/OUTPT VISIT, EST, LEVL III, 20-29 MIN: ICD-10-PCS | Mod: S$GLB,,, | Performed by: FAMILY MEDICINE

## 2021-08-02 PROCEDURE — 99213 OFFICE O/P EST LOW 20 MIN: CPT | Mod: S$GLB,,, | Performed by: FAMILY MEDICINE

## 2021-09-13 ENCOUNTER — OFFICE VISIT (OUTPATIENT)
Dept: FAMILY MEDICINE | Facility: CLINIC | Age: 61
End: 2021-09-13
Payer: COMMERCIAL

## 2021-09-13 VITALS
BODY MASS INDEX: 28.28 KG/M2 | SYSTOLIC BLOOD PRESSURE: 122 MMHG | WEIGHT: 191.5 LBS | DIASTOLIC BLOOD PRESSURE: 78 MMHG | RESPIRATION RATE: 14 BRPM | OXYGEN SATURATION: 100 % | HEART RATE: 87 BPM | TEMPERATURE: 98 F

## 2021-09-13 DIAGNOSIS — E11.69 HYPERLIPIDEMIA ASSOCIATED WITH TYPE 2 DIABETES MELLITUS: ICD-10-CM

## 2021-09-13 DIAGNOSIS — E78.5 HYPERLIPIDEMIA ASSOCIATED WITH TYPE 2 DIABETES MELLITUS: ICD-10-CM

## 2021-09-13 DIAGNOSIS — E11.8 DIABETES MELLITUS TYPE 2 WITH COMPLICATIONS: Primary | ICD-10-CM

## 2021-09-13 DIAGNOSIS — I15.2 HYPERTENSION ASSOCIATED WITH DIABETES: ICD-10-CM

## 2021-09-13 DIAGNOSIS — E11.59 HYPERTENSION ASSOCIATED WITH DIABETES: ICD-10-CM

## 2021-09-13 PROCEDURE — 99214 OFFICE O/P EST MOD 30 MIN: CPT | Mod: S$GLB,,, | Performed by: FAMILY MEDICINE

## 2021-09-13 PROCEDURE — 99214 PR OFFICE/OUTPT VISIT, EST, LEVL IV, 30-39 MIN: ICD-10-PCS | Mod: S$GLB,,, | Performed by: FAMILY MEDICINE

## 2021-12-02 RX ORDER — CARVEDILOL 25 MG/1
TABLET ORAL
Qty: 60 TABLET | Refills: 0 | Status: SHIPPED | OUTPATIENT
Start: 2021-12-02 | End: 2022-01-03

## 2021-12-02 RX ORDER — METFORMIN HYDROCHLORIDE 1000 MG/1
TABLET ORAL
Qty: 60 TABLET | Refills: 0 | Status: SHIPPED | OUTPATIENT
Start: 2021-12-02 | End: 2022-01-10 | Stop reason: SDUPTHER

## 2021-12-06 ENCOUNTER — LAB VISIT (OUTPATIENT)
Dept: LAB | Facility: HOSPITAL | Age: 61
End: 2021-12-06
Attending: FAMILY MEDICINE
Payer: COMMERCIAL

## 2021-12-06 DIAGNOSIS — E11.8 DIABETES MELLITUS TYPE 2 WITH COMPLICATIONS: ICD-10-CM

## 2021-12-06 LAB
ESTIMATED AVG GLUCOSE: 192 MG/DL (ref 68–131)
HBA1C MFR BLD: 8.3 % (ref 4–5.6)

## 2021-12-06 PROCEDURE — 36415 COLL VENOUS BLD VENIPUNCTURE: CPT | Mod: PO | Performed by: FAMILY MEDICINE

## 2021-12-06 PROCEDURE — 83036 HEMOGLOBIN GLYCOSYLATED A1C: CPT | Performed by: FAMILY MEDICINE

## 2021-12-07 ENCOUNTER — TELEPHONE (OUTPATIENT)
Dept: FAMILY MEDICINE | Facility: CLINIC | Age: 61
End: 2021-12-07
Payer: COMMERCIAL

## 2021-12-15 ENCOUNTER — PATIENT OUTREACH (OUTPATIENT)
Dept: ADMINISTRATIVE | Facility: OTHER | Age: 61
End: 2021-12-15
Payer: COMMERCIAL

## 2021-12-20 ENCOUNTER — OFFICE VISIT (OUTPATIENT)
Dept: OPHTHALMOLOGY | Facility: CLINIC | Age: 61
End: 2021-12-20
Payer: COMMERCIAL

## 2021-12-20 ENCOUNTER — OFFICE VISIT (OUTPATIENT)
Dept: FAMILY MEDICINE | Facility: CLINIC | Age: 61
End: 2021-12-20
Payer: COMMERCIAL

## 2021-12-20 VITALS
DIASTOLIC BLOOD PRESSURE: 92 MMHG | BODY MASS INDEX: 28.36 KG/M2 | TEMPERATURE: 99 F | WEIGHT: 191.5 LBS | HEART RATE: 86 BPM | RESPIRATION RATE: 20 BRPM | OXYGEN SATURATION: 98 % | HEIGHT: 69 IN | SYSTOLIC BLOOD PRESSURE: 148 MMHG

## 2021-12-20 DIAGNOSIS — H35.012 RETINAL MACROANEURYSM, LEFT EYE: ICD-10-CM

## 2021-12-20 DIAGNOSIS — E11.8 DIABETES MELLITUS TYPE 2 WITH COMPLICATIONS: Primary | ICD-10-CM

## 2021-12-20 DIAGNOSIS — Z79.4 CONTROLLED TYPE 2 DIABETES MELLITUS WITH BOTH EYES AFFECTED BY PROLIFERATIVE RETINOPATHY AND MACULAR EDEMA, WITH LONG-TERM CURRENT USE OF INSULIN: Primary | ICD-10-CM

## 2021-12-20 DIAGNOSIS — E11.59 HYPERTENSION ASSOCIATED WITH DIABETES: ICD-10-CM

## 2021-12-20 DIAGNOSIS — E11.3513 CONTROLLED TYPE 2 DIABETES MELLITUS WITH BOTH EYES AFFECTED BY PROLIFERATIVE RETINOPATHY AND MACULAR EDEMA, WITH LONG-TERM CURRENT USE OF INSULIN: Primary | ICD-10-CM

## 2021-12-20 DIAGNOSIS — I15.2 HYPERTENSION ASSOCIATED WITH DIABETES: ICD-10-CM

## 2021-12-20 PROCEDURE — 99214 PR OFFICE/OUTPT VISIT, EST, LEVL IV, 30-39 MIN: ICD-10-PCS | Mod: S$GLB,,, | Performed by: FAMILY MEDICINE

## 2021-12-20 PROCEDURE — 99999 PR PBB SHADOW E&M-EST. PATIENT-LVL III: CPT | Mod: PBBFAC,,, | Performed by: OPHTHALMOLOGY

## 2021-12-20 PROCEDURE — 92201 OPSCPY EXTND RTA DRAW UNI/BI: CPT | Mod: S$GLB,,, | Performed by: OPHTHALMOLOGY

## 2021-12-20 PROCEDURE — 92235 FLUORESCEIN ANGIOGRAPHY - OU - BOTH EYES: ICD-10-PCS | Mod: S$GLB,,, | Performed by: OPHTHALMOLOGY

## 2021-12-20 PROCEDURE — 92014 COMPRE OPH EXAM EST PT 1/>: CPT | Mod: S$GLB,,, | Performed by: OPHTHALMOLOGY

## 2021-12-20 PROCEDURE — 92235 FLUORESCEIN ANGRPH MLTIFRAME: CPT | Mod: S$GLB,,, | Performed by: OPHTHALMOLOGY

## 2021-12-20 PROCEDURE — 92134 POSTERIOR SEGMENT OCT RETINA (OCULAR COHERENCE TOMOGRAPHY)-BOTH EYES: ICD-10-PCS | Mod: S$GLB,,, | Performed by: OPHTHALMOLOGY

## 2021-12-20 PROCEDURE — 99214 OFFICE O/P EST MOD 30 MIN: CPT | Mod: S$GLB,,, | Performed by: FAMILY MEDICINE

## 2021-12-20 PROCEDURE — 92134 CPTRZ OPH DX IMG PST SGM RTA: CPT | Mod: S$GLB,,, | Performed by: OPHTHALMOLOGY

## 2021-12-20 PROCEDURE — 92201 PR OPHTHALMOSCOPY, EXT, W/RET DRAW/SCLERAL DEPR, I&R, UNI/BI: ICD-10-PCS | Mod: S$GLB,,, | Performed by: OPHTHALMOLOGY

## 2021-12-20 PROCEDURE — 92014 PR EYE EXAM, EST PATIENT,COMPREHESV: ICD-10-PCS | Mod: S$GLB,,, | Performed by: OPHTHALMOLOGY

## 2021-12-20 PROCEDURE — 99999 PR PBB SHADOW E&M-EST. PATIENT-LVL III: ICD-10-PCS | Mod: PBBFAC,,, | Performed by: OPHTHALMOLOGY

## 2021-12-20 RX ORDER — INSULIN PUMP SYRINGE, 3 ML
EACH MISCELLANEOUS
Qty: 1 EACH | Refills: 0 | Status: SHIPPED | OUTPATIENT
Start: 2021-12-20

## 2021-12-20 RX ORDER — LANCETS
EACH MISCELLANEOUS
Qty: 100 EACH | Refills: 5 | Status: SHIPPED | OUTPATIENT
Start: 2021-12-20

## 2021-12-20 RX ORDER — AMLODIPINE BESYLATE 5 MG/1
5 TABLET ORAL DAILY
Qty: 30 TABLET | Refills: 1 | Status: SHIPPED | OUTPATIENT
Start: 2021-12-20 | End: 2022-01-10 | Stop reason: SDUPTHER

## 2021-12-20 RX ORDER — INSULIN GLARGINE 100 [IU]/ML
10 INJECTION, SOLUTION SUBCUTANEOUS NIGHTLY
Qty: 3 ML | Refills: 1 | Status: SHIPPED | OUTPATIENT
Start: 2021-12-20 | End: 2022-07-11

## 2021-12-20 RX ADMIN — FLUORESCEIN 500 MG: 500 INJECTION INTRAVENOUS at 10:12

## 2021-12-27 ENCOUNTER — TELEPHONE (OUTPATIENT)
Dept: FAMILY MEDICINE | Facility: CLINIC | Age: 61
End: 2021-12-27
Payer: COMMERCIAL

## 2022-01-02 NOTE — TELEPHONE ENCOUNTER
Care Due:                  Date            Visit Type   Department     Provider  --------------------------------------------------------------------------------                                             [facility]    Katelyn Barrett  Last Visit: 12-      None         MEDICINE       Anger  Next Visit: None Scheduled  None         None Found                                                            Last  Test          Frequency    Reason                     Performed    Due Date  --------------------------------------------------------------------------------    CMP.........  12 months..  insulin,                   Not Found    Overdue                             irbesartan-hydrochlorothi                             azide, metFORMIN,                             rosuvastatin.............    Lipid Panel.  12 months..  rosuvastatin.............  Not Found    Overdue    Powered by [employer]. Reference number: [id number].   1/02/2022 1:20:30 PM CST

## 2022-01-03 RX ORDER — CARVEDILOL 25 MG/1
TABLET ORAL
Qty: 60 TABLET | Refills: 0 | Status: SHIPPED | OUTPATIENT
Start: 2022-01-03 | End: 2022-01-10 | Stop reason: SDUPTHER

## 2022-01-07 NOTE — TELEPHONE ENCOUNTER
No new care gaps identified.  Powered by [employer]. Reference number: [id number].   1/07/2022 5:38:17 AM CST

## 2022-01-10 ENCOUNTER — OFFICE VISIT (OUTPATIENT)
Dept: FAMILY MEDICINE | Facility: CLINIC | Age: 62
End: 2022-01-10
Payer: COMMERCIAL

## 2022-01-10 VITALS
DIASTOLIC BLOOD PRESSURE: 80 MMHG | BODY MASS INDEX: 28.67 KG/M2 | WEIGHT: 193.56 LBS | HEIGHT: 69 IN | RESPIRATION RATE: 20 BRPM | OXYGEN SATURATION: 100 % | SYSTOLIC BLOOD PRESSURE: 125 MMHG | TEMPERATURE: 98 F | HEART RATE: 81 BPM

## 2022-01-10 DIAGNOSIS — E11.59 HYPERTENSION ASSOCIATED WITH DIABETES: ICD-10-CM

## 2022-01-10 DIAGNOSIS — E11.8 DIABETES MELLITUS TYPE 2 WITH COMPLICATIONS: Primary | ICD-10-CM

## 2022-01-10 DIAGNOSIS — E78.5 HYPERLIPIDEMIA ASSOCIATED WITH TYPE 2 DIABETES MELLITUS: ICD-10-CM

## 2022-01-10 DIAGNOSIS — I15.2 HYPERTENSION ASSOCIATED WITH DIABETES: ICD-10-CM

## 2022-01-10 DIAGNOSIS — E11.69 HYPERLIPIDEMIA ASSOCIATED WITH TYPE 2 DIABETES MELLITUS: ICD-10-CM

## 2022-01-10 PROCEDURE — 99213 PR OFFICE/OUTPT VISIT, EST, LEVL III, 20-29 MIN: ICD-10-PCS | Mod: S$GLB,,, | Performed by: FAMILY MEDICINE

## 2022-01-10 PROCEDURE — 99213 OFFICE O/P EST LOW 20 MIN: CPT | Mod: S$GLB,,, | Performed by: FAMILY MEDICINE

## 2022-01-10 RX ORDER — CARVEDILOL 25 MG/1
25 TABLET ORAL 2 TIMES DAILY WITH MEALS
Qty: 60 TABLET | Refills: 3 | Status: SHIPPED | OUTPATIENT
Start: 2022-01-10 | End: 2022-02-03

## 2022-01-10 RX ORDER — AMLODIPINE BESYLATE 5 MG/1
5 TABLET ORAL DAILY
Qty: 30 TABLET | Refills: 3 | Status: SHIPPED | OUTPATIENT
Start: 2022-01-10 | End: 2022-03-14

## 2022-01-10 RX ORDER — METFORMIN HYDROCHLORIDE 1000 MG/1
TABLET ORAL
Qty: 60 TABLET | Refills: 0 | OUTPATIENT
Start: 2022-01-10

## 2022-01-10 RX ORDER — METFORMIN HYDROCHLORIDE 1000 MG/1
1000 TABLET ORAL 2 TIMES DAILY WITH MEALS
Qty: 60 TABLET | Refills: 5 | Status: SHIPPED | OUTPATIENT
Start: 2022-01-10 | End: 2022-07-07

## 2022-01-10 NOTE — TELEPHONE ENCOUNTER
Ce DC. Request already responded to by other means (e.g. phone or fax)   Refill Authorization Note   Nohelia Rodriguez  is requesting a refill authorization.  Brief Assessment and Rationale for Refill:  Quick Discontinue  Medication Therapy Plan:       Medication Reconciliation Completed:  No      Comments:   Pended Medication(s)       Requested Prescriptions     Refused Prescriptions Disp Refills    metFORMIN (GLUCOPHAGE) 1000 MG tablet [Pharmacy Med Name: metFORMIN HCl 1000 MG Oral Tablet] 60 tablet 0     Sig: TAKE 1 TABLET BY MOUTH TWICE DAILY WITH MEALS     Refused By: SHIRLEY GTZ     Reason for Refusal: Request already responded to by other means (e.g. phone or fax)        Duplicate Pended Encounter(s)/ Last Prescribed Details: (includes pharmacy & prescriber details)   Providers    Authorizing Provider:    Katelyn Barrett MD   [address] [place] [address]   Phone:  [phone]   Fax:  [phone]   MAGDALENO #:  [id number]   NPI:  [id number]        Ordering User:  Katelyn Barrett MD          [place] [address]   Phone:  [phone]  Fax:  [phone]   MAGDALENO #:  --   ALYSE Reason: --       Outpatient Medication Detail     Disp Refills Start End ALYSE   metFORMIN (GLUCOPHAGE) 1000 MG tablet 60 tablet 5 1/10/2022  No   Sig - Route: Take 1 tablet (1,000 mg total) by mouth 2 (two) times daily with meals. - Oral   Sent to pharmacy as: metFORMIN (GLUCOPHAGE) 1000 MG tablet   Class: Normal   Order: [id number]   Date/Time Signed: 1/10/2022 08:02       E-Prescribing Status: Receipt confirmed by pharmacy (1/10/2022  8:03 AM CST)     Ordering Encounter Report    Associated Reports   View Encounter                Note composed:8:30 AM 01/10/2022

## 2022-01-10 NOTE — PROGRESS NOTES
Subjective:       Patient ID: Nohelia Rodriguez is a 61 y.o. female.    Chief Complaint: Follow-up    HPI   The patient is a 61-year-old who is here today for short for follow-up.  Overall, she is doing well.  She is staying healthy although half of [employer] is out sick.  Today we discussed the followin)   Hypertension.  Today her blood pressure is 138/86.  She is surprised about that reading but wonders if it is from all the running around she has been doing this morning.  She has been checking her blood pressure at home and her readings are usually in the 119-120 range.  She does find that the addition of the Norvasc has helped her blood pressure.  In addition to Norvasc, she is taking Avalide and Coreg.  She has tolerated all her medication well with no side effects   2)  Diabetes.  She is currently taking Lantus 10 units and Glucophage 1000 mg twice a day.  Due to cost, she is not taking the Ozempic (which was going to cost 300 dollars) or Trulicity (which was going to cost 200 dollars).  She is excited for her next A1c as she expects it to be in the 6 range.  She continues to watch her diet carefully and is staying physically active    Review of Systems   Constitutional: Negative for appetite change, chills, diaphoresis, fatigue, fever and unexpected weight change.   HENT: Negative for congestion, ear pain, postnasal drip, rhinorrhea, sinus pressure, sneezing, sore throat and trouble swallowing.    Eyes: Negative for pain, discharge and visual disturbance.   Respiratory: Negative for cough, chest tightness, shortness of breath and wheezing.    Cardiovascular: Negative for chest pain, palpitations and leg swelling.   Gastrointestinal: Negative for abdominal distention, abdominal pain, blood in stool, constipation, diarrhea, nausea and vomiting.   Skin: Negative for rash.       Objective:      Physical Exam  Constitutional:       General: She is not in acute distress.     Appearance: Normal appearance. She is  well-developed.   HENT:      Head: Normocephalic and atraumatic.      Right Ear: Hearing, tympanic membrane, ear canal and external ear normal.      Left Ear: Hearing, tympanic membrane, ear canal and external ear normal.      Nose: Nose normal.      Mouth/Throat:      Mouth: No oral lesions.      Pharynx: No oropharyngeal exudate or posterior oropharyngeal erythema.   Eyes:      General: Lids are normal. No scleral icterus.     Extraocular Movements: Extraocular movements intact.      Conjunctiva/sclera: Conjunctivae normal.      Pupils: Pupils are equal, round, and reactive to light.   Neck:      Thyroid: No thyroid mass or thyromegaly.      Vascular: No carotid bruit.   Cardiovascular:      Rate and Rhythm: Normal rate and regular rhythm.  No extrasystoles are present.     Chest Wall: PMI is not displaced.      Heart sounds: Normal heart sounds. No murmur heard.  No gallop.    Pulmonary:      Effort: Pulmonary effort is normal. No accessory muscle usage or respiratory distress.      Breath sounds: Normal breath sounds.   Chest:   Breasts:      Right: No supraclavicular adenopathy.      Left: No supraclavicular adenopathy.       Abdominal:      General: Bowel sounds are normal. There is no abdominal bruit.      Palpations: Abdomen is soft.      Tenderness: There is no abdominal tenderness. There is no rebound.   Musculoskeletal:      Cervical back: Normal range of motion and neck supple.   Lymphadenopathy:      Head:      Right side of head: No submental or submandibular adenopathy.      Left side of head: No submental or submandibular adenopathy.      Cervical:      Right cervical: No superficial, deep or posterior cervical adenopathy.     Left cervical: No superficial, deep or posterior cervical adenopathy.      Upper Body:      Right upper body: No supraclavicular adenopathy.      Left upper body: No supraclavicular adenopathy.   Skin:     General: Skin is warm and dry.   Neurological:      Mental Status: She  "is alert and oriented to person, place, and time.       Blood pressure 125/80, pulse 81, temperature 97.5 °F (36.4 °C), resp. rate 20, height 5' 9" (1.753 m), weight 87.8 kg (193 lb 9 oz), SpO2 100 %.Body mass index is 28.58 kg/m².            A/P:  1) hypertension.  Well controlled.  Continue with Norvasc, Avalide and Coreg.  We will re-evaluate her blood pressure at her next visit.  If her home readings are consistently higher than 135/85, she will let me know  2)  Diabetes.  Well controlled based on home readings.  For now she will continue with the Lantus and the Glucophage.  We will check an A1c in March.  She will continue her efforts with diet exercise weight loss   3)  Hyperlipidemia.  Previously well controlled.  Continue with Crestor.  We are checking a fasting lipid profile prior to her next visit      As long as she does well, I will see her back in March or sooner if needed  "

## 2022-02-01 NOTE — TELEPHONE ENCOUNTER
No new care gaps identified.  Powered by [employer]. Reference number: [id number].   2/01/2022 5:23:51 AM CST

## 2022-02-03 RX ORDER — CARVEDILOL 25 MG/1
TABLET ORAL
Qty: 180 TABLET | Refills: 3 | Status: SHIPPED | OUTPATIENT
Start: 2022-02-03 | End: 2022-08-30

## 2022-02-03 NOTE — TELEPHONE ENCOUNTER
Refill Authorization Note   Nohelia Rodriguez  is requesting a refill authorization.  Brief Assessment and Rationale for Refill:  Approve     Medication Therapy Plan:       Medication Reconciliation Completed: No   Comments:   --->Care Gap information included below if applicable.       Requested Prescriptions   Pending Prescriptions Disp Refills    carvediloL (COREG) 25 MG tablet [Pharmacy Med Name: Carvedilol 25 MG Oral Tablet] 180 tablet 3     Sig: TAKE 1 TABLET BY MOUTH TWICE DAILY WITH MEALS       Cardiovascular:  Beta Blockers Passed - 2/1/2022  5:23 AM        Passed - Patient is at least 18 years old        Passed - Last BP in normal range within 360 days     BP Readings from Last 1 Encounters:   01/10/22 125/80               Passed - Last Heart Rate in normal range within 360 days     Pulse Readings from Last 1 Encounters:   01/10/22 81              Passed - Valid encounter within last 15 months     Recent Visits  Date Type Provider Dept   01/10/22 Office Visit Katelyn Barrett MD [facility]   12/20/21 Office Visit Katelyn Barrett MD [facility]   09/13/21 Office Visit Katelyn Barrett MD [facility]   08/02/21 Office Visit Katelyn Barrett MD [facility]   04/09/21 Office Visit Katelyn Barrett MD [facility]   12/10/20 Office Visit Katelyn Barrett MD [facility]   09/24/20 Office Visit Katelyn Barrett MD [facility]   08/18/20 Office Visit Katelyn Barertt MD [facility]   05/15/20 Office Visit Katelyn Barrett MD [facility]   Showing recent visits within past 720 days and meeting all other requirements  Future Appointments  No visits were found meeting these conditions.  Showing future appointments within next 150 days and meeting all other requirements      Future Appointments              In 1 month [facility], [facility]    In 1 month Katelyn Barrett MD [facility] [place]                     Appointments  past 12m or future 3m with PCP    Date Provider   Last Visit   1/10/2022 Katelyn Barrett MD   Next Visit   3/14/2022 Katelyn Barrett MD   ED visits in past 90 days: 0     Note composed:7:41 AM 02/03/2022

## 2022-02-09 ENCOUNTER — PATIENT OUTREACH (OUTPATIENT)
Dept: ADMINISTRATIVE | Facility: HOSPITAL | Age: 62
End: 2022-02-09
Payer: COMMERCIAL

## 2022-02-09 NOTE — LETTER
February 9, 2022    Nohelia Rodriguez  [address] [address] [address]             [facility]  [address]  [place] [address]  Phone: [phone] Dear Sharon, [facility] is committed to your overall health and would like to ensure that you are up to date on your recommended test and/or procedures.   Katelyn Barrett MD has found that your chart shows you may be due for the following:      Mammogram  Urine test for your kidneys (Urine Microalbumin)    Future Appointments   Date Time Provider Department Center   3/7/2022  8:50 AM LAB, [facility] [place]   3/14/2022  9:10 AM Katelyn Barrett MD [facility]     If you have had any of the above done at another facility, please let us know so that we may obtain copies from that facility.  If you have a copy of these records, please provide a copy so that we may update your records.  Also, You are welcome to request that the report be faxed to us at  ([phone]).      If you have an upcoming scheduled appointment for the above test and/or procedures, please disregard this letter.  Otherwise, please contact us through your [facility] portal or by calling [phone] and we will assist in scheduling these appointments for you.      Thank you for letting us care for you,    Sincerely,    Katelyn Barrett MD and your [facility] Team

## 2022-02-09 NOTE — PROGRESS NOTES
Non-compliant GAP report chart review - Chart review completed for the following HM test if overdue  (Mammogram, Colonoscopy, Cervical Cancer Screening,  Diabetic lab testing, and/or Dilated EYE EXAM)  02/09/2022    Care Everywhere and Media reports - updates requested and reviewed.        Uncontrolled A1C >8    LABS, OFFICE VISIT SCHEDULED REMINDER SENT,  DUE FOR MAMMO    Hemoglobin A1C   Date Value Ref Range Status   12/06/2021 8.3 (H) 4.0 - 5.6 % Final     Comment:     ADA Screening Guidelines:  5.7-6.4%  Consistent with prediabetes  >or=6.5%  Consistent with diabetes    High levels of fetal hemoglobin interfere with the HbA1C  assay. Heterozygous hemoglobin variants (HbS, HgC, etc)do  not significantly interfere with this assay.   However, presence of multiple variants may affect accuracy.     07/19/2021 8.3 (H) 4.0 - 5.6 % Final     Comment:     ADA Screening Guidelines:  5.7-6.4%  Consistent with prediabetes  >or=6.5%  Consistent with diabetes    High levels of fetal hemoglobin interfere with the HbA1C  assay. Heterozygous hemoglobin variants (HbS, HgC, etc)do  not significantly interfere with this assay.   However, presence of multiple variants may affect accuracy.     04/01/2021 7.3 (H) 4.0 - 5.6 % Final     Comment:     ADA Screening Guidelines:  5.7-6.4%  Consistent with prediabetes  >or=6.5%  Consistent with diabetes    High levels of fetal hemoglobin interfere with the HbA1C  assay. Heterozygous hemoglobin variants (HbS, HgC, etc)do  not significantly interfere with this assay.   However, presence of multiple variants may affect accuracy.                Health Maintenance Due   Topic Date Due    Mammogram  03/01/2022    Diabetes Urine Screening  04/01/2022

## 2022-02-14 ENCOUNTER — TELEPHONE (OUTPATIENT)
Dept: FAMILY MEDICINE | Facility: CLINIC | Age: 62
End: 2022-02-14
Payer: COMMERCIAL

## 2022-02-14 DIAGNOSIS — E11.8 DIABETES MELLITUS TYPE 2 WITH COMPLICATIONS: Primary | ICD-10-CM

## 2022-02-15 NOTE — TELEPHONE ENCOUNTER
LM for patient. Mammo scheduled. Patient already has labs scheduled for 3/7 to be done prior to 3/14 OV

## 2022-02-18 ENCOUNTER — TELEPHONE (OUTPATIENT)
Dept: FAMILY MEDICINE | Facility: CLINIC | Age: 62
End: 2022-02-18
Payer: COMMERCIAL

## 2022-02-18 NOTE — TELEPHONE ENCOUNTER
----- Message from Ghada Rangel sent at 2/18/2022  9:25 AM CST -----  Contact: Pt.  Type: Needs Medical Advice    Who Called: pt  Best Call Back Number: [phone]   Requesting a call back regarding - pt is asking for a call back please wants to be testes for covid . Many of her co workers have it please call   Please Advise- Thank you

## 2022-02-28 ENCOUNTER — TELEPHONE (OUTPATIENT)
Dept: FAMILY MEDICINE | Facility: CLINIC | Age: 62
End: 2022-02-28
Payer: COMMERCIAL

## 2022-02-28 NOTE — TELEPHONE ENCOUNTER
----- Message from Che Dorantes sent at 2/28/2022  8:57 AM CST -----  Regarding: pt called  Name of Who is Calling: TINO ALEXANDER [[id number]]      What is the request in detail: pt needs a lab order put in for a urine for kidney. Please advise       Can the clinic reply by [facility]: No      What Number to Call Back if not [facility]: [phone]

## 2022-03-07 ENCOUNTER — HOSPITAL ENCOUNTER (OUTPATIENT)
Dept: RADIOLOGY | Facility: HOSPITAL | Age: 62
Discharge: HOME | End: 2022-03-07
Attending: FAMILY MEDICINE
Payer: COMMERCIAL

## 2022-03-07 DIAGNOSIS — E11.8 DIABETES MELLITUS TYPE 2 WITH COMPLICATIONS: ICD-10-CM

## 2022-03-07 PROCEDURE — 77067 SCR MAMMO BI INCL CAD: CPT | Mod: 26,,, | Performed by: RADIOLOGY

## 2022-03-07 PROCEDURE — 77067 MAMMO DIGITAL SCREENING BILAT WITH TOMO: ICD-10-PCS | Mod: 26,,, | Performed by: RADIOLOGY

## 2022-03-07 PROCEDURE — 77063 BREAST TOMOSYNTHESIS BI: CPT | Mod: 26,,, | Performed by: RADIOLOGY

## 2022-03-07 PROCEDURE — 77063 BREAST TOMOSYNTHESIS BI: CPT | Mod: TC,PO

## 2022-03-07 PROCEDURE — 77063 MAMMO DIGITAL SCREENING BILAT WITH TOMO: ICD-10-PCS | Mod: 26,,, | Performed by: RADIOLOGY

## 2022-03-07 PROCEDURE — 77067 SCR MAMMO BI INCL CAD: CPT | Mod: TC,PO

## 2022-03-09 ENCOUNTER — TELEPHONE (OUTPATIENT)
Dept: FAMILY MEDICINE | Facility: CLINIC | Age: 62
End: 2022-03-09
Payer: COMMERCIAL

## 2022-03-09 ENCOUNTER — TELEPHONE (OUTPATIENT)
Dept: HEMATOLOGY/ONCOLOGY | Facility: CLINIC | Age: 62
End: 2022-03-09
Payer: COMMERCIAL

## 2022-03-09 DIAGNOSIS — E11.8 DIABETES MELLITUS TYPE 2 WITH COMPLICATIONS: Primary | ICD-10-CM

## 2022-03-09 DIAGNOSIS — D64.9 ANEMIA, UNSPECIFIED TYPE: ICD-10-CM

## 2022-03-09 NOTE — TELEPHONE ENCOUNTER
Pls notify pt:    Mammogram was normal!  Let's recheck this again in 1 year or sooner if needed.      Overall, labs look good except for diabetes and anemia  Your diabetes is worse than the last time and is the highest we've seen it with an a1c of 9.0.  For this, I want you to see dm ed and and the endo.  Keep appt with me as planned  Your anemia continues to be present.  I would like you to see the hematologist again for this.  They will contact you for an appt    Keep upcoming appt as planned

## 2022-03-14 ENCOUNTER — CLINICAL SUPPORT (OUTPATIENT)
Dept: DIABETES | Facility: CLINIC | Age: 62
End: 2022-03-14
Payer: COMMERCIAL

## 2022-03-14 ENCOUNTER — OFFICE VISIT (OUTPATIENT)
Dept: FAMILY MEDICINE | Facility: CLINIC | Age: 62
End: 2022-03-14
Payer: COMMERCIAL

## 2022-03-14 VITALS
TEMPERATURE: 98 F | SYSTOLIC BLOOD PRESSURE: 139 MMHG | RESPIRATION RATE: 20 BRPM | DIASTOLIC BLOOD PRESSURE: 84 MMHG | HEART RATE: 84 BPM | HEIGHT: 69 IN | OXYGEN SATURATION: 96 % | BODY MASS INDEX: 29.54 KG/M2 | WEIGHT: 199.44 LBS

## 2022-03-14 VITALS — WEIGHT: 199.5 LBS | HEIGHT: 69 IN | BODY MASS INDEX: 29.55 KG/M2

## 2022-03-14 DIAGNOSIS — I15.2 HYPERTENSION ASSOCIATED WITH DIABETES: Primary | ICD-10-CM

## 2022-03-14 DIAGNOSIS — R79.89 ABNORMAL CBC: ICD-10-CM

## 2022-03-14 DIAGNOSIS — E11.8 DIABETES MELLITUS TYPE 2 WITH COMPLICATIONS: ICD-10-CM

## 2022-03-14 DIAGNOSIS — E11.69 HYPERLIPIDEMIA ASSOCIATED WITH TYPE 2 DIABETES MELLITUS: ICD-10-CM

## 2022-03-14 DIAGNOSIS — E11.59 HYPERTENSION ASSOCIATED WITH DIABETES: Primary | ICD-10-CM

## 2022-03-14 DIAGNOSIS — E78.5 HYPERLIPIDEMIA ASSOCIATED WITH TYPE 2 DIABETES MELLITUS: ICD-10-CM

## 2022-03-14 PROCEDURE — 99214 OFFICE O/P EST MOD 30 MIN: CPT | Mod: S$GLB,,, | Performed by: FAMILY MEDICINE

## 2022-03-14 PROCEDURE — 99214 PR OFFICE/OUTPT VISIT, EST, LEVL IV, 30-39 MIN: ICD-10-PCS | Mod: S$GLB,,, | Performed by: FAMILY MEDICINE

## 2022-03-14 PROCEDURE — 99999 PR PBB SHADOW E&M-EST. PATIENT-LVL III: ICD-10-PCS | Mod: PBBFAC,,,

## 2022-03-14 PROCEDURE — G0108 PR DIAB MANAGE TRN  PER INDIV: ICD-10-PCS | Mod: S$GLB,,, | Performed by: INTERNAL MEDICINE

## 2022-03-14 PROCEDURE — G0108 DIAB MANAGE TRN  PER INDIV: HCPCS | Mod: S$GLB,,, | Performed by: INTERNAL MEDICINE

## 2022-03-14 PROCEDURE — 99999 PR PBB SHADOW E&M-EST. PATIENT-LVL III: CPT | Mod: PBBFAC,,,

## 2022-03-14 RX ORDER — AMLODIPINE BESYLATE 10 MG/1
10 TABLET ORAL DAILY
Qty: 30 TABLET | Refills: 1 | Status: SHIPPED | OUTPATIENT
Start: 2022-03-14 | End: 2022-05-12

## 2022-03-14 RX ORDER — LANCETS 33 GAUGE
EACH MISCELLANEOUS 3 TIMES DAILY
COMMUNITY
Start: 2021-12-21

## 2022-03-14 NOTE — PROGRESS NOTES
Subjective:       Patient ID: Nohelia Rodriguez is a 62 y.o. female.    Chief Complaint: Follow-up    HPI   The patient is a 62-year-old who is here today for follow-up.  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  Things have been a bit chaotic at work given the staff shortages and with being in inventory season but things are starting to settle down.  She has worked from over for 25 years and is thinking about retiring.    She is disappointed about her weight gain and attributes this to eating out a lot with her birthday.  She is going to get back on making progress with weight loss    Today we discussed the followin)  Diabetes.  Her recent A1c was 9.0 up from prior value of 8.3.  She is currently taking Lantus 10 units and Glucophage 1000 mg twice a day.  She was previously on Trulicity and Ozempic but they were  too expensive for her to continue.  Her fasting sugars are usually less than 90. Her last 3 readings have been 77, 88 and 97. She occasionally checks her sugars at other times during the day and they are usually in the higher 100s.  She denies any hypoglycemic episodes.  She is scheduled to meet with diabetic Education later today and with the endocrinologist in 2 weeks  2)  Hyperlipidemia.  She is doing well with her Crestor.  Her recent total cholesterol was 127 and her LDL was 52.    3) hypertension.  Today blood pressure is 139/84.  She is taking Coreg 25 mg twice a day, Norvasc 5 mg once a day and Avalide 300/12.5 mg once a day.  She does not check her blood pressure at home  4) abnormal CBC.  We did discuss her abnormal CBC today.  She previously saw Hematology and there was discussion of a bone marrow biopsy but that never happened.  She does have an appointment with a new hematologist next week      Review of Systems   Constitutional: Negative for appetite change, chills, diaphoresis, fatigue, fever and unexpected weight change.   HENT: Negative for  congestion, ear pain, postnasal drip, rhinorrhea, sinus pressure, sneezing, sore throat and trouble swallowing.    Eyes: Negative for pain, discharge and visual disturbance.   Respiratory: Negative for cough, chest tightness, shortness of breath and wheezing.    Cardiovascular: Negative for chest pain, palpitations and leg swelling.   Gastrointestinal: Negative for abdominal distention, abdominal pain, blood in stool, constipation, diarrhea, nausea and vomiting.   Skin: Negative for rash.       Objective:      Physical Exam  Constitutional:       General: She is not in acute distress.     Appearance: Normal appearance. She is well-developed.   HENT:      Head: Normocephalic and atraumatic.      Right Ear: Hearing, tympanic membrane, ear canal and external ear normal.      Left Ear: Hearing, tympanic membrane, ear canal and external ear normal.      Nose: Nose normal.      Mouth/Throat:      Mouth: No oral lesions.      Pharynx: No oropharyngeal exudate or posterior oropharyngeal erythema.   Eyes:      General: Lids are normal. No scleral icterus.     Extraocular Movements: Extraocular movements intact.      Conjunctiva/sclera: Conjunctivae normal.      Pupils: Pupils are equal, round, and reactive to light.   Neck:      Thyroid: No thyroid mass or thyromegaly.      Vascular: No carotid bruit.   Cardiovascular:      Rate and Rhythm: Normal rate and regular rhythm.  No extrasystoles are present.     Chest Wall: PMI is not displaced.      Heart sounds: Normal heart sounds. No murmur heard.    No gallop.   Pulmonary:      Effort: Pulmonary effort is normal. No accessory muscle usage or respiratory distress.      Breath sounds: Normal breath sounds.   Chest:   Breasts:      Right: No supraclavicular adenopathy.      Left: No supraclavicular adenopathy.       Abdominal:      General: Bowel sounds are normal. There is no abdominal bruit.      Palpations: Abdomen is soft.      Tenderness: There is no abdominal tenderness.  "There is no rebound.   Musculoskeletal:      Cervical back: Normal range of motion and neck supple.   Lymphadenopathy:      Head:      Right side of head: No submental or submandibular adenopathy.      Left side of head: No submental or submandibular adenopathy.      Cervical:      Right cervical: No superficial, deep or posterior cervical adenopathy.     Left cervical: No superficial, deep or posterior cervical adenopathy.      Upper Body:      Right upper body: No supraclavicular adenopathy.      Left upper body: No supraclavicular adenopathy.   Skin:     General: Skin is warm and dry.   Neurological:      Mental Status: She is alert and oriented to person, place, and time.       Blood pressure 139/84, pulse 84, temperature 98.2 °F (36.8 °C), resp. rate 20, height 5' 9" (1.753 m), weight 90.5 kg (199 lb 6.5 oz), SpO2 96 %.Body mass index is 29.45 kg/m².            A/P:  1) diabetes.  Uncontrolled.  She will meet with diabetic education later today.  She will meet with the endocrinologist in 2 weeks.  I did give her diabetic log so that she can start checking her sugars before meals which will hopefully help us pinpoint where she is having higher sugar readings since her fasting readings sound good.  For now she will continue with the Lantus and the metformin but we will make adjustments after review her diabetic log.  She will work on eating healthy and exercising regularly.  Of note, she has been very successful with diet and weight loss efforts as she used to almost over 400 lb  2)  Hyperlipidemia.  Well controlled.  Continue with Crestor.  Recheck labs in 1 year  3) hypertension.  Suboptimally controlled.  We are going to increase her Norvasc to 10 mg once a day.  She will continue with the Coreg and the Avalide.  I am going to do a renal artery ultrasound to rule out renal artery stenosis.   4)  Abnormal CBC.  Persistent.  Follow-up with Hematology as planned for next week    I will see her back in 3 weeks for " follow-up or sooner if needed

## 2022-03-15 NOTE — PROGRESS NOTES
Diabetes Care Specialist Progress Note  Author: Sherrie Francis RD, CDE  Date: 3/15/2022    Program Intake  Reason for Diabetes Program Visit:: Initial Diabetes Assessment- Patient is in clinic for basic diabetes education.  She states it has been several years since she last had education.  She attributes the recent increase in her blood sugars to eating out more and not being as active in her birthday month.    Current diabetes risk level:: moderate  In the last 12 months, have you:: none    Lab Results   Component Value Date    HGBA1C 9.0 (H) 03/07/2022     Clinical    Problem Review  Reviewed Problem List with Patient: yes  Active comorbidities affecting diabetes self-care.: no  Reviewed health maintenance: yes    Clinical Assessment  Current Diabetes Treatment: Metformin, Lantus 10 every morning  Have you ever experienced hypoglycemia?: no  Have you ever experienced hyperglycemia?: no (denies any recent symptoms)    Medication Information  How many days a week do you miss your medications?: Never  Do you sometimes have difficulty refilling your medications?: No  Medication adherence impacting ability to self-manage diabetes?: No    Labs  Do you have regular lab work to monitor your medications?: Yes  Type of Regular Lab Work: A1c, Cholesterol, BMP  Where do you get your labs drawn?: [facility]  Lab Compliance Barriers: No    Nutritional Status  Diet: Diabetic diet-Admits that she had not been doing good with her diet in the month of February as it was her birthday and she was eating out more often and not going to the gym.  Normally eats low to moderate carbs with meals and limits sweets and desserts.  Drinks only water.  Sometimes skipping lunch or dinner if not hungry  Change in appetite?: No  Dentation:: Intact  Recent Changes in Weight: No Recent Weight Change (Had lost about 40#'s more recently but gained some back last month)  Current nutritional status an area of need that is impacting patient's ability to  self-manage diabetes?: No    Additional Social History    Support  Who takes you to your medical appointments?: self  Is Support an area impacting ability to self-manage diabetes?: No    Access to Mass Media & Technology  Does the patient have access to any of the following devices or technologies?: Smart phone  Media or technology needs impacting ability to self-manage diabetes?: No    Cognitive/Behavioral Health  Alert and Oriented: Yes  Difficulty Thinking: No  Requires Prompting: No  Requires assistance for routine expression?: No  Cognitive or behavioral barriers impacting ability to self-manage diabetes?: No    Culture/Quaker  Culture or Taoist beliefs that may impact ability to access healthcare: No    Communication  Language preference: English  Hearing Problems: No  Vision Problems: No  Communication needs impacting ability to self-manage diabetes?: No    Health Literacy  Preferred Learning Method: Face to Face  How often do you need to have someone help you read instructions, pamphlets, or written material from your doctor or pharmacy?: Never  Health literacy needs impacting ability to self-manage diabetes?: No    Diabetes Self-Management Skills Assessment    Diabetes Disease Process/Treatment Options  Patient/caregiver able to state what happens when someone has diabetes.: yes  Patient/caregiver knows what type of diabetes they have.: yes  Diabetes Type : Type II  Patient/caregiver able to identify at least three signs and symptoms of diabetes.: yes  Identified signs and symptoms:: increased thirst, frequent urination  Patient able to identify at least three risk factors for diabetes.: yes  Diabetes Disease Process/Treatment Options: Skills Assessment Completed: Yes  Assessment indicates:: Adequate understanding  Area of need?: No    Nutrition/Healthy Eating  Challenges to healthy eating:: portion control, snacking between meals and at night, eating out, going to parties  Method of carbohydrate  measurement:: no method  Patient can identify foods that impact blood sugar.: yes  Patient-identified foods:: starches (bread, pasta, rice, cereal), sweets, fruit/fruit juice  Nutrition/Healthy Eating Skills Assessment Completed:: Yes  Assessment indicates:: Instruction Needed  Area of need?: Yes    Physical Activity/Exercise  Patient's daily activity level:: moderately active-Usually goes to the gym 3 times weekly to work with a  and has been away for a month but plans to get back with usual routine  Physical Activity/Exercise Skills Assessment Completed: : Yes  Assessment indicates:: Adequate understanding  Area of need?: Yes    Medications  Patient is able to describe current diabetes management routine.: yes  Diabetes management routine:: oral medications, insulin  Patient is able to identify current diabetes medications, dosages, and appropriate timing of medications.: yes  Patient understands the purpose of the medications taken for diabetes.: yes  Patient reports problems or concerns with current medication regimen.: no  Medication Skills Assessment Completed:: Yes  Assessment indicates:: Adequate understanding  Area of need?: No    Home Blood Glucose Monitoring  Patient states that blood sugar is checked at home daily.: yes  Monitoring Method:: home glucometer  How often do you check your blood sugar?: Once a day-Has been asked to increase her monitoring to 3 times daily before meals.  When do you check your blood sugar?: Before breakfast  When you check what is your typical blood sugar range? :  Patient reports fasting numbers are between  on average.  She was not checking in afternoon and evening to see when elevations are occuring.  Reports meter is about 2-3 years old  Blood glucose logs:: no, encouraged to keep logs, encouraged to bring logs to provider visits  Blood glucose logs reviewed today?: no  Home Blood Glucose Monitoring Skills Assessment Completed: : Yes  Assessment indicates::  Instruction Needed  Area of need?: Yes    Acute Complications  Patient is able to identify types of acute complications: No (reviewed symptoms, prevention and treatment of hypoglycemia today)  Acute Complications Skills Assessment Completed: : Yes  Assessment indicates:: Adequate understanding  Area of need?: No    Chronic Complications  Patient can identify major chronic complications of diabetes.: yes  Stated chronic complications:: heart disease/heart attack, kidney disease  Patient can identify ways to prevent or delay diabetes complications.: yes  Patient is aware that having diabetes increases risk of heart disease?: Yes  Patient is taking statin?: Yes  Chronic Complications Skills Assessment Completed: : Yes  Assessment indicates:: Adequate understanding  Area of need?: No    Psychosocial/Coping  Patient can identify ways of coping with chronic disease.: yes  Patient-stated ways of coping with chronic disease:: support from loved ones  Psychosocial/Coping Skills Assessment Completed: : Yes  Assessment indicates:: Adequate understanding  Area of need?: No    Assessment Summary and Plan    Based on today's diabetes care assessment, the following areas of need were identified:      Social 3/14/2022   Support No   Access to Mass Media/Tech No   Cognitive/Behavioral Health No   Culture/Confucianism No   Communication No   Health Literacy No      Clinical 3/14/2022   Medication Adherence No   Lab Compliance No   Nutritional Status No      Diabetes Self-Management Skills 3/14/2022   Diabetes Disease Process/Treatment Options No   Nutrition/Healthy Eating Yes- care planning created today   Physical Activity/Exercise Yes- encouraged regular activity to help enhance weight loss and improved blood sugar control   Medication No   Home Blood Glucose Monitoring Yes- care planning created today   Acute Complications No   Chronic Complications No   Psychosocial/Coping No      Today's interventions were provided through  individual discussion, instruction, and written materials were provided.      Patient verbalized understanding of instruction and written materials.  Pt was able to return back demonstration of instructions today. Patient understood key points, needs reinforcement and further instruction.     Diabetes Self-Management Care Plan:    Today's Diabetes Self-Management Care Plan was developed with Nohelia's input. Nohelia has agreed to work toward the following goal(s) to improve his/her overall diabetes control.      Care Plan: Diabetes Management   Updates made since 2/13/2022 12:00 AM      Problem: Healthy Eating       Goal: Eat 3 meals daily with <45g of Carbohydrate per meal.  Limit snacks to less than 15g of carb.  Spread out carbs more evenly throughout the day.    Start Date: 3/15/2022   Priority: Medium   Barriers: No Barriers Identified      Task: Reviewed the sources and role of Carbohydrate, Protein, and Fat and how each nutrient impacts blood sugar. Completed 3/15/2022      Task: Explained how to count carbohydrates using the food label and the use of dry measuring cups for accurate carb counting. Completed 3/15/2022      Task: Review the importance of balancing carbohydrates with each meal using portion control techniques to count servings of carbohydrate and label reading to identify serving size and amount of total carbs per serving. Completed 3/15/2022      Task: Provided Sample plate method and reviewed the use of the plate to estimate amounts of carbohydrate per meal. Completed 3/15/2022      Problem: Blood Glucose Self-Monitoring       Goal: Patient agrees to check and record blood sugars 2-3 times per day before meals.  Keep logs to bring to clinic visits for review.    Start Date: 3/15/2022   Priority: High   Barriers: No Barriers Identified      Task: Reviewed the importance of self-monitoring blood glucose and keeping logs. Completed 3/15/2022      Task: Provided patient with blood glucose logs,  reviewed appropriate timing and frequency to SMBG, education on parameters on when to notify provider and advised patient to bring logs to all appts with PCP/Endocrinologist/Diabetes Care Specialist. Completed 3/15/2022        Follow Up Plan     Follow up in about 3 months (around 6/14/2022).   Patient seems very motivated to get back to her usual routine with diet and exercise.  Good compliance expected.  She will increase her frequency of monitoring and bring logs to 3/28 appointment with Olya Fernandez for review.  Encouraged to call in interim with questions/concerns.      Today's care plan and follow up schedule was discussed with patient.  Nohelia verbalized understanding of the care plan, goals, and agrees to follow up plan.        The patient was encouraged to communicate with his/her health care provider/physician and care team regarding his/her condition(s) and treatment.  I provided the patient with my contact information today and encouraged to contact me via phone or [facility] Patient Portal as needed.     Length of Visit   Total Time: 45 Minutes

## 2022-03-21 ENCOUNTER — OFFICE VISIT (OUTPATIENT)
Dept: HEMATOLOGY/ONCOLOGY | Facility: CLINIC | Age: 62
End: 2022-03-21
Payer: COMMERCIAL

## 2022-03-21 ENCOUNTER — LAB VISIT (OUTPATIENT)
Dept: LAB | Facility: HOSPITAL | Age: 62
End: 2022-03-21
Payer: COMMERCIAL

## 2022-03-21 VITALS
DIASTOLIC BLOOD PRESSURE: 74 MMHG | SYSTOLIC BLOOD PRESSURE: 146 MMHG | RESPIRATION RATE: 16 BRPM | TEMPERATURE: 97 F | OXYGEN SATURATION: 100 % | HEART RATE: 74 BPM | HEIGHT: 69 IN | BODY MASS INDEX: 29.27 KG/M2 | WEIGHT: 197.63 LBS

## 2022-03-21 DIAGNOSIS — I10 PRIMARY HYPERTENSION: ICD-10-CM

## 2022-03-21 DIAGNOSIS — E11.3513 CONTROLLED TYPE 2 DIABETES MELLITUS WITH BOTH EYES AFFECTED BY PROLIFERATIVE RETINOPATHY AND MACULAR EDEMA, WITH LONG-TERM CURRENT USE OF INSULIN: ICD-10-CM

## 2022-03-21 DIAGNOSIS — D72.819 LEUKOPENIA, UNSPECIFIED TYPE: ICD-10-CM

## 2022-03-21 DIAGNOSIS — D64.9 NORMOCYTIC ANEMIA: Primary | ICD-10-CM

## 2022-03-21 DIAGNOSIS — D50.8 IRON DEFICIENCY ANEMIA SECONDARY TO INADEQUATE DIETARY IRON INTAKE: ICD-10-CM

## 2022-03-21 DIAGNOSIS — D64.9 NORMOCYTIC ANEMIA: ICD-10-CM

## 2022-03-21 DIAGNOSIS — D64.9 ANEMIA, UNSPECIFIED TYPE: ICD-10-CM

## 2022-03-21 DIAGNOSIS — Z79.4 CONTROLLED TYPE 2 DIABETES MELLITUS WITH BOTH EYES AFFECTED BY PROLIFERATIVE RETINOPATHY AND MACULAR EDEMA, WITH LONG-TERM CURRENT USE OF INSULIN: ICD-10-CM

## 2022-03-21 LAB
BASOPHILS # BLD AUTO: 0.04 K/UL (ref 0–0.2)
BASOPHILS NFR BLD: 1.3 % (ref 0–1.9)
DIFFERENTIAL METHOD: ABNORMAL
EOSINOPHIL # BLD AUTO: 0.1 K/UL (ref 0–0.5)
EOSINOPHIL NFR BLD: 3.8 % (ref 0–8)
ERYTHROCYTE [DISTWIDTH] IN BLOOD BY AUTOMATED COUNT: 14.5 % (ref 11.5–14.5)
FERRITIN SERPL-MCNC: 68 NG/ML (ref 20–300)
FOLATE SERPL-MCNC: 14 NG/ML (ref 4–24)
HCT VFR BLD AUTO: 33.5 % (ref 37–48.5)
HGB BLD-MCNC: 10.8 G/DL (ref 12–16)
IGA SERPL-MCNC: 268 MG/DL (ref 40–350)
IGG SERPL-MCNC: 1599 MG/DL (ref 650–1600)
IGM SERPL-MCNC: 59 MG/DL (ref 50–300)
IMM GRANULOCYTES # BLD AUTO: 0.01 K/UL (ref 0–0.04)
IMM GRANULOCYTES NFR BLD AUTO: 0.3 % (ref 0–0.5)
IRON SERPL-MCNC: 71 UG/DL (ref 30–160)
LYMPHOCYTES # BLD AUTO: 1.3 K/UL (ref 1–4.8)
LYMPHOCYTES NFR BLD: 42.7 % (ref 18–48)
MCH RBC QN AUTO: 27.6 PG (ref 27–31)
MCHC RBC AUTO-ENTMCNC: 32.2 G/DL (ref 32–36)
MCV RBC AUTO: 86 FL (ref 82–98)
MONOCYTES # BLD AUTO: 0.3 K/UL (ref 0.3–1)
MONOCYTES NFR BLD: 8.3 % (ref 4–15)
NEUTROPHILS # BLD AUTO: 1.4 K/UL (ref 1.8–7.7)
NEUTROPHILS NFR BLD: 43.6 % (ref 38–73)
NRBC BLD-RTO: 0 /100 WBC
PATH REV BLD -IMP: NORMAL
PLATELET # BLD AUTO: 251 K/UL (ref 150–450)
PMV BLD AUTO: 10.6 FL (ref 9.2–12.9)
RBC # BLD AUTO: 3.91 M/UL (ref 4–5.4)
SATURATED IRON: 16 % (ref 20–50)
TOTAL IRON BINDING CAPACITY: 450 UG/DL (ref 250–450)
TRANSFERRIN SERPL-MCNC: 304 MG/DL (ref 200–375)
VIT B12 SERPL-MCNC: >2000 PG/ML (ref 210–950)
WBC # BLD AUTO: 3.14 K/UL (ref 3.9–12.7)

## 2022-03-21 PROCEDURE — 36415 COLL VENOUS BLD VENIPUNCTURE: CPT | Mod: PN

## 2022-03-21 PROCEDURE — 86334 IMMUNOFIX E-PHORESIS SERUM: CPT | Mod: 26,,, | Performed by: PATHOLOGY

## 2022-03-21 PROCEDURE — 82525 ASSAY OF COPPER: CPT

## 2022-03-21 PROCEDURE — 84165 PATHOLOGIST INTERPRETATION SPE: ICD-10-PCS | Mod: 26,,, | Performed by: PATHOLOGY

## 2022-03-21 PROCEDURE — 82728 ASSAY OF FERRITIN: CPT

## 2022-03-21 PROCEDURE — 86334 IMMUNOFIX E-PHORESIS SERUM: CPT

## 2022-03-21 PROCEDURE — 82746 ASSAY OF FOLIC ACID SERUM: CPT

## 2022-03-21 PROCEDURE — 85060 BLOOD SMEAR INTERPRETATION: CPT | Mod: ,,, | Performed by: PATHOLOGY

## 2022-03-21 PROCEDURE — 84630 ASSAY OF ZINC: CPT

## 2022-03-21 PROCEDURE — 84165 PROTEIN E-PHORESIS SERUM: CPT | Mod: 26,,, | Performed by: PATHOLOGY

## 2022-03-21 PROCEDURE — 82607 VITAMIN B-12: CPT

## 2022-03-21 PROCEDURE — 83520 IMMUNOASSAY QUANT NOS NONAB: CPT | Mod: 59

## 2022-03-21 PROCEDURE — 99999 PR PBB SHADOW E&M-EST. PATIENT-LVL IV: CPT | Mod: PBBFAC,,,

## 2022-03-21 PROCEDURE — 85060 PATHOLOGIST REVIEW: ICD-10-PCS | Mod: ,,, | Performed by: PATHOLOGY

## 2022-03-21 PROCEDURE — 84466 ASSAY OF TRANSFERRIN: CPT

## 2022-03-21 PROCEDURE — 99215 OFFICE O/P EST HI 40 MIN: CPT | Mod: S$GLB,,,

## 2022-03-21 PROCEDURE — 99215 PR OFFICE/OUTPT VISIT, EST, LEVL V, 40-54 MIN: ICD-10-PCS | Mod: S$GLB,,,

## 2022-03-21 PROCEDURE — 85025 COMPLETE CBC W/AUTO DIFF WBC: CPT | Mod: PN

## 2022-03-21 PROCEDURE — 82784 ASSAY IGA/IGD/IGG/IGM EACH: CPT

## 2022-03-21 PROCEDURE — 86334 PATHOLOGIST INTERPRETATION IFE: ICD-10-PCS | Mod: 26,,, | Performed by: PATHOLOGY

## 2022-03-21 PROCEDURE — 99999 PR PBB SHADOW E&M-EST. PATIENT-LVL IV: ICD-10-PCS | Mod: PBBFAC,,,

## 2022-03-21 PROCEDURE — 84165 PROTEIN E-PHORESIS SERUM: CPT

## 2022-03-21 NOTE — PROGRESS NOTES
PATIENT: Nohelia Rodriguez  MRN: [id number]  DATE: 3/21/2022    Diagnosis:   1. Normocytic anemia    2. Leukopenia, unspecified type    3. 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 Primary hypertension    5. Anemia, unspecified type    6. Iron deficiency anemia secondary to inadequate dietary iron intake      Chief Complaint: Consult (Anemia, Dr. Barrett)    Subjective:     Initial History: Ms. Rodriguez is a 62 y.o. female with DM type 2, HTN, who is seen today at the request of Dr. Barrett for evaluation of normocytic anemia and leukopenia.   Patient was previously seen by Dr. Riley in 2019 for the same diagnosis. After work-up, he recommended a BMBX, but patient was lost to follow-up due to COVID-19 pandemic.  After review of labs today there has been no major change in blood counts, Hgb remains stable between 10.2-10.8 g/dL , WBC ranges from 2.81-3.83k. SPEP/PACHECO in 8/2019 showed no monoclonal proteins. She was found to be iron deficient in the past and continues to take PO iron supplement daily.  Last colonoscopy was 3/22/2019 with normal findings and recommendation for 10 year follow-up.   No family history of blood disorders or anemia. No reported surgical history aside from tubal ligation.  HTN is managed by Dr. Barrett. Currently on Carvedilol, Amlodipine; doses were recently adjusted.   Patient reports that she is feeling more tired in recent weeks but attributes this to working long hours. No history of recurrent infections. She denies HA, CP, SOB, abd pain, changes in bowel habits, hematochezia, melena, dysuria, hematuria, rashes, swelling, easy bruising or bleeding. No fevers, chills, new lumps or bumps. No PICA.     Past Medical History:   Past Medical History:   Diagnosis Date    Diabetes mellitus     Diabetic retinopathy     s/p laser treatment    Glaucoma     Hypertension     S/P colonoscopy     3/19  next due 3/29       Past Surgical History:   Past Surgical History:   Procedure Laterality Date    COLONOSCOPY N/A 3/22/2019    Procedure: COLONOSCOPY;  Surgeon: Kishor Membreno MD;  Location: [facility];  Service: Endoscopy;  Laterality: N/A;    diabetic retinopathy laser         Family History:   Family History   Problem Relation Age of Onset    Diabetes Mother     Cataracts Mother     Hypertension Mother     Diabetes Sister     Hypertension Sister     Hyperlipidemia Sister         a fib    Diabetes Brother     Hypertension Brother     Hydrocephalus Son     Diabetes Brother     Diabetes Sister     Diabetes Sister        Social History:  reports that she has never smoked. She has never used smokeless tobacco. She reports that she does not drink alcohol and does not use drugs.    Allergies:  Review of patient's allergies indicates:  No Known Allergies    Medications:  Current Outpatient Medications   Medication Sig Dispense Refill    amLODIPine (NORVASC) 10 MG tablet Take 1 tablet (10 mg total) by mouth once daily. 30 tablet 1    blood sugar diagnostic Strp To check BG QD times daily, to use with insurance preferred meter 100 each 5    blood-glucose meter kit As directed 1 each 0    carvediloL (COREG) 25 MG tablet TAKE 1 TABLET BY MOUTH TWICE DAILY WITH MEALS 180 tablet 3    insulin (LANTUS SOLOSTAR U-100 INSULIN) glargine 100 units/mL (3mL) SubQ pen Inject 10 Units into the skin every evening. 3 mL 1    irbesartan-hydrochlorothiazide (AVALIDE) 300-12.5 mg per tablet Take 1 tablet by mouth once daily 90 tablet 2    lancets Misc To check BG QD times daily, to use with insurance preferred meter 100 each 5    latanoprost 0.005 % ophthalmic solution Place 1 drop into both eyes every evening.       metFORMIN (GLUCOPHAGE) 1000 MG tablet Take 1 tablet (1,000 mg total) by mouth 2 (two) times daily with meals. 60 tablet 5    ONETOUCH DELICA PLUS LANCET 33 gauge Misc Apply topically 3 (three) times  "daily.      rosuvastatin (CRESTOR) 10 MG tablet Take 1 tablet by mouth once daily for 90 days 90 tablet 3     No current facility-administered medications for this visit.       Review of Systems   Constitutional: Negative for activity change, chills, fatigue and fever.   HENT: Negative for nosebleeds.    Respiratory: Negative for cough and shortness of breath.    Cardiovascular: Negative for chest pain and palpitations.   Gastrointestinal: Negative for abdominal pain, blood in stool, constipation, diarrhea and nausea.   Genitourinary: Negative for dysuria and hematuria.   Skin: Negative for pallor and rash.   Neurological: Negative for light-headedness and headaches.   Hematological: Negative for adenopathy. Does not bruise/bleed easily.       ECOG Performance Status:   ECOG SCORE           Objective:      Vitals:   Vitals:    03/21/22 1302   BP: (!) 146/74   BP Location: Left arm   Patient Position: Sitting   Pulse: 74   Resp: 16   Temp: 97.4 °F (36.3 °C)   TempSrc: Temporal   SpO2: 100%   Weight: 89.7 kg (197 lb 10.3 oz)   Height: 5' 9" (1.753 m)     BMI: Body mass index is 29.19 kg/m².    Physical Exam  Vitals reviewed.   Constitutional:       Appearance: She is not diaphoretic.   HENT:      Head: Normocephalic and atraumatic.   Eyes:      General: No scleral icterus.  Cardiovascular:      Rate and Rhythm: Normal rate and regular rhythm.      Heart sounds: Normal heart sounds. No murmur heard.    No gallop.   Pulmonary:      Effort: Pulmonary effort is normal. No respiratory distress.      Breath sounds: Normal breath sounds. No wheezing or rhonchi.   Abdominal:      General: Bowel sounds are normal. There is no distension.      Palpations: Abdomen is soft. There is no mass.      Tenderness: There is no abdominal tenderness. There is no guarding.   Musculoskeletal:      Cervical back: No tenderness.      Right lower leg: No edema.      Left lower leg: No edema.   Lymphadenopathy:      Cervical: No cervical " adenopathy.   Skin:     General: Skin is warm and dry.      Findings: No bruising or rash.   Neurological:      Mental Status: She is alert and oriented to person, place, and time.      Gait: Gait normal.   Psychiatric:         Mood and Affect: Mood normal.         Behavior: Behavior normal.         Laboratory Data:  Lab Results   Component Value Date    WBC 3.14 (L) 03/21/2022    HGB 10.8 (L) 03/21/2022    HCT 33.5 (L) 03/21/2022    MCV 86 03/21/2022     03/21/2022        Assessment:       1. Normocytic anemia    2. Leukopenia, unspecified type    3. 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 Primary hypertension    5. Anemia, unspecified type    6. Iron deficiency anemia secondary to inadequate dietary iron intake      Plan:   Normocytic anemia  -Prior work-up showed no monoclonal protein; will recheck today  -Hgb on 3/7/22 was 10.5 g/dL; stable since 6/2019  -History of iron deficiency with decreased iron saturation, taking PO iron; will check iron indices today  -Will check CBC, SPEP, PACHECO, FLC, Immunoglobulins, Iron/TIBC, ferritin, zinc, copper, B12 and folate levels. Will request peripheral smear for pathologist review.   -Discussed potential for BMBX, patient agreeable to this after we review lab results    Leukopenia  -WBC 3.14 on 3/7/22; stable from 6/2019  -Labs as above, will monitor  -Discussed BMBX with patient    Diabetes Mellitus type 2  -Recently met with diabetic educator  -Taking Metformin  -Managed per PCP    HTN  -Amlodipine recently increased to 10 mg daily  -Taking Irbesartan-HCTZ, carvedilol, amlodipine  -Managed per PCP      Labs today: CBC, SPEP, PACHECO, FLC, Immunoglobulins, Iron/TIBC, ferritin, zinc, copper, B12 and folate levels. Will request peripheral smear for pathologist review.   Follow-up: One week to review lab results. May move forward with scheduling BMBX at that  time    Patient queried and all questions were answered.

## 2022-03-21 NOTE — Clinical Note
Labs today: CBC, SPEP, PACHECO, FLC, Immunoglobulins, copper, zinc, ferritin, iron/TIBC, B12, folate, peripheral smear for path review  Follow-up in one week to review results.

## 2022-03-22 LAB
ALBUMIN SERPL ELPH-MCNC: 4.17 G/DL (ref 3.35–5.55)
ALPHA1 GLOB SERPL ELPH-MCNC: 0.27 G/DL (ref 0.17–0.41)
ALPHA2 GLOB SERPL ELPH-MCNC: 0.81 G/DL (ref 0.43–0.99)
B-GLOBULIN SERPL ELPH-MCNC: 0.92 G/DL (ref 0.5–1.1)
GAMMA GLOB SERPL ELPH-MCNC: 1.43 G/DL (ref 0.67–1.58)
INTERPRETATION SERPL IFE-IMP: NORMAL
KAPPA LC SER QL IA: 3.9 MG/DL (ref 0.33–1.94)
KAPPA LC/LAMBDA SER IA: 1.95 (ref 0.26–1.65)
LAMBDA LC SER QL IA: 2 MG/DL (ref 0.57–2.63)
PATH REV BLD -IMP: NORMAL
PATHOLOGIST INTERPRETATION IFE: NORMAL
PATHOLOGIST INTERPRETATION SPE: NORMAL
PROT SERPL-MCNC: 7.6 G/DL (ref 6–8.4)

## 2022-03-23 ENCOUNTER — PATIENT OUTREACH (OUTPATIENT)
Dept: ADMINISTRATIVE | Facility: HOSPITAL | Age: 62
End: 2022-03-23
Payer: COMMERCIAL

## 2022-03-23 NOTE — PROGRESS NOTES
Uncontrolled A1C >8    Hemoglobin A1C   Date Value Ref Range Status   03/07/2022 9.0 (H) 4.0 - 5.6 % Final     Comment:     ADA Screening Guidelines:  5.7-6.4%  Consistent with prediabetes  >or=6.5%  Consistent with diabetes    High levels of fetal hemoglobin interfere with the HbA1C  assay. Heterozygous hemoglobin variants (HbS, HgC, etc)do  not significantly interfere with this assay.   However, presence of multiple variants may affect accuracy.     12/06/2021 8.3 (H) 4.0 - 5.6 % Final     Comment:     ADA Screening Guidelines:  5.7-6.4%  Consistent with prediabetes  >or=6.5%  Consistent with diabetes    High levels of fetal hemoglobin interfere with the HbA1C  assay. Heterozygous hemoglobin variants (HbS, HgC, etc)do  not significantly interfere with this assay.   However, presence of multiple variants may affect accuracy.     07/19/2021 8.3 (H) 4.0 - 5.6 % Final     Comment:     ADA Screening Guidelines:  5.7-6.4%  Consistent with prediabetes  >or=6.5%  Consistent with diabetes    High levels of fetal hemoglobin interfere with the HbA1C  assay. Heterozygous hemoglobin variants (HbS, HgC, etc)do  not significantly interfere with this assay.   However, presence of multiple variants may affect accuracy.

## 2022-03-24 LAB
COPPER SERPL-MCNC: 1257 UG/L (ref 810–1990)
ZINC SERPL-MCNC: 69 UG/DL (ref 60–130)

## 2022-03-28 ENCOUNTER — OFFICE VISIT (OUTPATIENT)
Dept: HEMATOLOGY/ONCOLOGY | Facility: CLINIC | Age: 62
End: 2022-03-28
Payer: COMMERCIAL

## 2022-03-28 ENCOUNTER — OFFICE VISIT (OUTPATIENT)
Dept: ENDOCRINOLOGY | Facility: CLINIC | Age: 62
End: 2022-03-28
Payer: COMMERCIAL

## 2022-03-28 VITALS
SYSTOLIC BLOOD PRESSURE: 130 MMHG | BODY MASS INDEX: 28.88 KG/M2 | DIASTOLIC BLOOD PRESSURE: 80 MMHG | HEIGHT: 69 IN | TEMPERATURE: 97 F | WEIGHT: 195 LBS | HEART RATE: 76 BPM | OXYGEN SATURATION: 99 %

## 2022-03-28 VITALS
DIASTOLIC BLOOD PRESSURE: 70 MMHG | HEIGHT: 69 IN | BODY MASS INDEX: 29.01 KG/M2 | WEIGHT: 195.88 LBS | SYSTOLIC BLOOD PRESSURE: 130 MMHG | HEART RATE: 76 BPM

## 2022-03-28 DIAGNOSIS — D64.9 ANEMIA, UNSPECIFIED TYPE: ICD-10-CM

## 2022-03-28 DIAGNOSIS — I10 PRIMARY HYPERTENSION: ICD-10-CM

## 2022-03-28 DIAGNOSIS — Z79.4 CONTROLLED TYPE 2 DIABETES MELLITUS WITH BOTH EYES AFFECTED BY PROLIFERATIVE RETINOPATHY AND MACULAR EDEMA, WITH LONG-TERM CURRENT USE OF INSULIN: ICD-10-CM

## 2022-03-28 DIAGNOSIS — D64.9 NORMOCYTIC ANEMIA: Primary | ICD-10-CM

## 2022-03-28 DIAGNOSIS — Z79.4 TYPE 2 DIABETES MELLITUS WITH BOTH EYES AFFECTED BY PROLIFERATIVE RETINOPATHY AND MACULAR EDEMA, WITH LONG-TERM CURRENT USE OF INSULIN: Primary | ICD-10-CM

## 2022-03-28 DIAGNOSIS — D72.819 LEUKOPENIA, UNSPECIFIED TYPE: ICD-10-CM

## 2022-03-28 DIAGNOSIS — E11.3513 CONTROLLED TYPE 2 DIABETES MELLITUS WITH BOTH EYES AFFECTED BY PROLIFERATIVE RETINOPATHY AND MACULAR EDEMA, WITH LONG-TERM CURRENT USE OF INSULIN: ICD-10-CM

## 2022-03-28 DIAGNOSIS — E11.3513 TYPE 2 DIABETES MELLITUS WITH BOTH EYES AFFECTED BY PROLIFERATIVE RETINOPATHY AND MACULAR EDEMA, WITH LONG-TERM CURRENT USE OF INSULIN: Primary | ICD-10-CM

## 2022-03-28 PROCEDURE — 99999 PR PBB SHADOW E&M-EST. PATIENT-LVL IV: ICD-10-PCS | Mod: PBBFAC,,, | Performed by: NURSE PRACTITIONER

## 2022-03-28 PROCEDURE — 99999 PR PBB SHADOW E&M-EST. PATIENT-LVL IV: ICD-10-PCS | Mod: PBBFAC,,,

## 2022-03-28 PROCEDURE — 99204 PR OFFICE/OUTPT VISIT, NEW, LEVL IV, 45-59 MIN: ICD-10-PCS | Mod: S$GLB,,, | Performed by: NURSE PRACTITIONER

## 2022-03-28 PROCEDURE — 99213 OFFICE O/P EST LOW 20 MIN: CPT | Mod: S$GLB,,,

## 2022-03-28 PROCEDURE — 99213 PR OFFICE/OUTPT VISIT, EST, LEVL III, 20-29 MIN: ICD-10-PCS | Mod: S$GLB,,,

## 2022-03-28 PROCEDURE — 99999 PR PBB SHADOW E&M-EST. PATIENT-LVL IV: CPT | Mod: PBBFAC,,, | Performed by: NURSE PRACTITIONER

## 2022-03-28 PROCEDURE — 99999 PR PBB SHADOW E&M-EST. PATIENT-LVL IV: CPT | Mod: PBBFAC,,,

## 2022-03-28 PROCEDURE — 99204 OFFICE O/P NEW MOD 45 MIN: CPT | Mod: S$GLB,,, | Performed by: NURSE PRACTITIONER

## 2022-03-28 RX ORDER — DULAGLUTIDE 1.5 MG/.5ML
1.5 INJECTION, SOLUTION SUBCUTANEOUS
Qty: 4 PEN | Refills: 1 | Status: SHIPPED | OUTPATIENT
Start: 2022-03-28 | End: 2022-06-01

## 2022-03-28 NOTE — PATIENT INSTRUCTIONS
Blood sugar targets:  Fasting/before meal:   2 hours after meal: less than 180    Resume Trulicity 1.5 mg weekly. Activate coupon.  Stop Lantus if able to resume Trulicity.  Continue metformin.   Will consider Steglatro if Trulicity too expensive.     Notify me if blood sugars remain >150 before meals after resuming Trulicity.

## 2022-03-28 NOTE — PROGRESS NOTES
Subjective:     Name: Nohelia Rodriguez  : 1960  MRN: [id number]  CC: anemia, leukopenia     HPI:   Nohelia Rodriguez is a 62 y.o. female with diabetes type 2, HTN who presents for follow-up of Anemia (1 week follow up)  Patient is here today to go over lab results and discuss potential need for BMBX.     She denies HA, CP, SOB, abd pain, changes in bowel habits, hematochezia, melena, dysuria, hematuria, rashes, swelling, easy bruising or bleeding. No fevers, chills, new lumps or bumps. No PICA.     Prior history:  Patient was previously seen by Dr. Riley in 2019 for the same diagnosis. After work-up, he recommended a BMBX, but patient was lost to follow-up due to COVID-19 pandemic.  After review of labs today there has been no major change in blood counts, Hgb remains stable between 10.2-10.8 g/dL , WBC ranges from 2.81-3.83k. SPEP/PACHECO in 2019 showed no monoclonal proteins. She was found to be iron deficient in the past and continues to take PO iron supplement daily.  Last colonoscopy was 3/22/2019 with normal findings and recommendation for 10 year follow-up.   No family history of blood disorders or anemia. No reported surgical history aside from tubal ligation.  HTN is managed by Dr. Barrett. Currently on Carvedilol, Amlodipine; doses were recently adjusted.     Past Medical History:   Diagnosis Date    Diabetes mellitus     Diabetic retinopathy     s/p laser treatment    Glaucoma     Hypertension     S/P colonoscopy     3/19 next due 3/29       Past Surgical History:   Procedure Laterality Date    COLONOSCOPY N/A 3/22/2019    Procedure: COLONOSCOPY;  Surgeon: Kishor Membreno MD;  Location: [facility];  Service: Endoscopy;  Laterality: N/A;    diabetic retinopathy laser         Family History   Problem Relation Age of Onset    Diabetes Mother     Cataracts Mother     Hypertension Mother     Diabetes Sister     Hypertension Sister     Hyperlipidemia Sister         a fib    Diabetes Brother      Hypertension Brother     Hydrocephalus Son     Diabetes Brother     Diabetes Sister     Diabetes Sister        Social History     Socioeconomic History    Marital status:    Tobacco Use    Smoking status: Never Smoker    Smokeless tobacco: Never Used   Substance and Sexual Activity    Alcohol use: No    Drug use: No    Sexual activity: Not Currently   Social History Narrative    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       Review of patient's allergies indicates:  No Known Allergies    ROS         Objective:   There were no vitals filed for this visit.     Physical Exam        Current Outpatient Medications on File Prior to Visit   Medication Sig    amLODIPine (NORVASC) 10 MG tablet Take 1 tablet (10 mg total) by mouth once daily.    blood sugar diagnostic Strp To check BG QD times daily, to use with insurance preferred meter    blood-glucose meter kit As directed    carvediloL (COREG) 25 MG tablet TAKE 1 TABLET BY MOUTH TWICE DAILY WITH MEALS    dulaglutide (TRULICITY) 1.5 mg/0.5 mL pen injector Inject 1.5 mg into the skin every 7 days.    insulin (LANTUS SOLOSTAR U-100 INSULIN) glargine 100 units/mL (3mL) SubQ pen Inject 10 Units into the skin every evening. (Patient taking differently: Inject 10 Units into the skin once daily.)    irbesartan-hydrochlorothiazide (AVALIDE) 300-12.5 mg per tablet Take 1 tablet by mouth once daily    lancets Misc To check BG QD times daily, to use with insurance preferred meter    latanoprost 0.005 % ophthalmic solution Place 1 drop into both eyes every evening.     metFORMIN (GLUCOPHAGE) 1000 MG tablet Take 1 tablet (1,000 mg total) by mouth 2 (two) times daily with meals.    ONETOUCH DELICA PLUS LANCET 33 gauge Misc Apply topically 3 (three) times daily.    rosuvastatin (CRESTOR) 10 MG tablet Take 1 tablet by mouth once daily for 90 days     No current facility-administered medications on file prior to visit.       CBC:  Lab Results    Component Value Date    WBC 3.14 (L) 03/21/2022    HGB 10.8 (L) 03/21/2022    HCT 33.5 (L) 03/21/2022    MCV 86 03/21/2022     03/21/2022         CMP:  Sodium   Date Value Ref Range Status   03/07/2022 143 136 - 145 mmol/L Final     Potassium   Date Value Ref Range Status   03/07/2022 4.4 3.5 - 5.1 mmol/L Final     Chloride   Date Value Ref Range Status   03/07/2022 105 95 - 110 mmol/L Final     CO2   Date Value Ref Range Status   03/07/2022 26 23 - 29 mmol/L Final     Glucose   Date Value Ref Range Status   03/07/2022 70 70 - 110 mg/dL Final     BUN   Date Value Ref Range Status   03/07/2022 19 8 - 23 mg/dL Final     Creatinine   Date Value Ref Range Status   03/07/2022 0.9 0.5 - 1.4 mg/dL Final     Calcium   Date Value Ref Range Status   03/07/2022 10.4 8.7 - 10.5 mg/dL Final     Total Protein   Date Value Ref Range Status   03/07/2022 7.9 6.0 - 8.4 g/dL Final     Albumin   Date Value Ref Range Status   03/07/2022 4.0 3.5 - 5.2 g/dL Final     Total Bilirubin   Date Value Ref Range Status   03/07/2022 0.4 0.1 - 1.0 mg/dL Final     Comment:     For infants and newborns, interpretation of results should be based  on gestational age, weight and in agreement with clinical  observations.    Premature Infant recommended reference ranges:  Up to 24 hours.............<8.0 mg/dL  Up to 48 hours............<12.0 mg/dL  3-5 days..................<15.0 mg/dL  6-29 days.................<15.0 mg/dL       Alkaline Phosphatase   Date Value Ref Range Status   03/07/2022 51 (L) 55 - 135 U/L Final     AST   Date Value Ref Range Status   03/07/2022 19 10 - 40 U/L Final     ALT   Date Value Ref Range Status   03/07/2022 12 10 - 44 U/L Final     Anion Gap   Date Value Ref Range Status   03/07/2022 12 8 - 16 mmol/L Final     eGFR if    Date Value Ref Range Status   03/07/2022 >60.0 >60 mL/min/1.73 m^2 Final     eGFR if non    Date Value Ref Range Status   03/07/2022 >60.0 >60 mL/min/1.73 m^2 Final      Comment:     Calculation used to obtain the estimated glomerular filtration  rate (eGFR) is the CKD-EPI equation.           All pertinent labs and imaging reviewed.    Assessment:       1. Normocytic anemia    2. Leukopenia, unspecified type    3. 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 Anemia, unspecified type         Plan:   Normocytic anemia  -Labs on 3/21/22 showed no monoclonal protein  -Elevated Kappa Free Light Chains, slightly elevated Kappa/Lambda ratio  -Hgb on 3/21/22 was 10.8 g/dL; stable since 6/2019  -Iron indices are wnl with the exception of iron saturation; patient to continue PO iron supplement  -B12 level elevated, ok to stop po supplements  -Zinc, copper wnl, continue zinc supplement for the time being   -Peripheral smear for pathology review: Normocytic anemia with mild anisocytosis.  No significant   morphologic abnormalities.   -Discussed BMBX with the patient, she is agreeable. Consent signed.   -Follow up with Dr. Weldon after BMBX to review results      Leukopenia  -WBC 3.14 on 3/7/22; stable from 6/2019  -Peripheral smear for path review: WBC- Leukopenia with absolute neutropenia.  No dysplasia or   circulating blasts  -Discussed BMBX with patient; consent signed      Diabetes Mellitus type 2  -Recently met with diabetic educator  -Taking Metformin  -Managed per PCP     HTN  -Amlodipine 10 mg daily  -Taking Irbesartan-HCTZ, carvedilol, amlodipine  -Managed per PCP       Route Chart for Scheduling    Med Onc Chart Routing      Follow up with physician 6 weeks. Follow-up with Dr. Weldon 10-12 days after BMBX   Follow up with [facility]    Labs    Imaging    BMBX with Dr Weldon in approx one month   Pharmacy appointment    Other referrals Additional referrals needed       Patient queried and all questions were answered.

## 2022-03-28 NOTE — Clinical Note
Dr. Nena Ambrose. Ms. Rodriguez's glucoses sound to be improving with the exception of pre-dinner readings. Likely having prandial excursions. I suggested trying Steglatro since coupon covers nearly entire cost of medication. She would rather resume Trulicity and apply the coupon. I'm starting her at 1.5 mg weekly since she had been using 3 mg. Will plan to work back up to that. I explained that with Fasting BG tightly controlled, she could stop the Lantus if she does resume Trulicity. Just wanted to keep you in the loop. Thank you!

## 2022-03-28 NOTE — PROGRESS NOTES
CC: Ms. Nohelia Rodriguez arrives today for management of Type 2 DM and review of chronic medical conditions, as listed in the Visit Diagnosis section of this encounter.       HPI: Ms. Nohelia Rodriguez was diagnosed with Type 2 DM in her 40s. She did have GDM ~ 10 years prior to Type 2 DM diagnosis. She was diagnosed based on lab work. Initial treatment consisted of orals. + FH of DM in mother, 3 sisters, 2 brothers. Denies hospitalizations due to DM.     This is her first time being seen in endocrine.      She states that January and February were very difficult months for her and she feels that her blood sugar control suffered. Since then, she has seen her blood sugars decrease.     She previously tried Trulicity but this was too expensive. She stopped this earlier this year. In , she had coupon which brought copay down to a reasonable level. Her PCP placed her back on basal insulin when she came off of the Trulicity.     BG readings are checked 3x/day. Forgot logs at home. Reports the following:   Fastin-110  2 hours after breakfast: 118-125  Dinner: 150-160    Hypoglycemia: No, but does report some 70s in the morning.    Missing Insulin/PO medication doses: No  Timing prandial insulin 5-15 minutes before meals: n/a    Exercise: Yes - goes to gym 3 days/week.     Dietary Habits: Eats 2 meals/day. May skip lunch or dinner. May snack on fruit if skipping meal. Avoids suary beverages. .    Last DM education appointment: 3/14/2022      CURRENT DIABETIC MEDS: metformin 1000 mg BID, Lantus 10 units QAM  Vial or pen: pen  Glucometer type:     Previous DM treatments:  Glyburide  Trulicity - cost    Last Eye Exam: 2021, + DR. Jessica Herrera  Last Podiatry Exam: 2021    REVIEW OF SYSTEMS  Constitutional: no c/o fatigue, weakness, or weight loss. + weight gain but is losing since resuming exercise.   Eyes: denies visual disturbances.  Cardiac: no palpitations or chest pain.  Respiratory: no cough or dyspnea.  GI: no  "c/o abdominal pain or nausea. Denies h/o pancreatitis   : denies urinary frequency, burning, discharge, frequent UTIs  Skin: no lesions or rashes.  Neuro: no numbness, tingling, or parasthesias.  Endocrine: denies polyphagia, polydipsia, polyuria      Personally reviewed Past Medical, Surgical, Social History.    Vital Signs  /70   Pulse 76   Ht 5' 9" (1.753 m)   Wt 88.9 kg (195 lb 14.1 oz)   BMI 28.93 kg/m²     Personally reviewed the below labs:    Hemoglobin A1C   Date Value Ref Range Status   03/07/2022 9.0 (H) 4.0 - 5.6 % Final     Comment:     ADA Screening Guidelines:  5.7-6.4%  Consistent with prediabetes  >or=6.5%  Consistent with diabetes    High levels of fetal hemoglobin interfere with the HbA1C  assay. Heterozygous hemoglobin variants (HbS, HgC, etc)do  not significantly interfere with this assay.   However, presence of multiple variants may affect accuracy.     12/06/2021 8.3 (H) 4.0 - 5.6 % Final     Comment:     ADA Screening Guidelines:  5.7-6.4%  Consistent with prediabetes  >or=6.5%  Consistent with diabetes    High levels of fetal hemoglobin interfere with the HbA1C  assay. Heterozygous hemoglobin variants (HbS, HgC, etc)do  not significantly interfere with this assay.   However, presence of multiple variants may affect accuracy.     07/19/2021 8.3 (H) 4.0 - 5.6 % Final     Comment:     ADA Screening Guidelines:  5.7-6.4%  Consistent with prediabetes  >or=6.5%  Consistent with diabetes    High levels of fetal hemoglobin interfere with the HbA1C  assay. Heterozygous hemoglobin variants (HbS, HgC, etc)do  not significantly interfere with this assay.   However, presence of multiple variants may affect accuracy.         Chemistry        Component Value Date/Time     03/07/2022 0907    K 4.4 03/07/2022 0907     03/07/2022 0907    CO2 26 03/07/2022 0907    BUN 19 03/07/2022 0907    CREATININE 0.9 03/07/2022 0907    GLU 70 03/07/2022 0907        Component Value Date/Time    " CALCIUM 10.4 03/07/2022 0907    ALKPHOS 51 (L) 03/07/2022 0907    AST 19 03/07/2022 0907    ALT 12 03/07/2022 0907    BILITOT 0.4 03/07/2022 0907    ESTGFRAFRICA >60.0 03/07/2022 0907    EGFRNONAA >60.0 03/07/2022 0907          Lab Results   Component Value Date    CHOL 127 03/07/2022    CHOL 136 04/01/2021    CHOL 112 (L) 05/15/2020     Lab Results   Component Value Date    HDL 60 03/07/2022    HDL 49 04/01/2021    HDL 43 05/15/2020     Lab Results   Component Value Date    LDLCALC 52.8 (L) 03/07/2022    LDLCALC 75.4 04/01/2021    LDLCALC 54.2 (L) 05/15/2020     Lab Results   Component Value Date    TRIG 71 03/07/2022    TRIG 58 04/01/2021    TRIG 74 05/15/2020     Lab Results   Component Value Date    CHOLHDL 47.2 03/07/2022    CHOLHDL 36.0 04/01/2021    CHOLHDL 38.4 05/15/2020       Lab Results   Component Value Date    MICALBCREAT 16.7 03/07/2022     Lab Results   Component Value Date    TSH 0.504 04/01/2021       CrCl cannot be calculated (Patient's most recent lab result is older than the maximum 7 days allowed.).    Vit D, 25-Hydroxy   Date Value Ref Range Status   05/25/2019 36 30 - 96 ng/mL Final     Comment:     Vitamin D deficiency.........<10 ng/mL                              Vitamin D insufficiency......10-29 ng/mL       Vitamin D sufficiency........> or equal to 30 ng/mL  Vitamin D toxicity............>100 ng/mL           PHYSICAL EXAMINATION  Constitutional: Appears well, no distress  Neck: Supple, trachea midline.  Respiratory: CTA, even and unlabored.  Cardiovascular: RRR, no murmurs, no carotid bruits.    GI: no hernia noted  Skin: warm and dry; no visible wounds  Neuro: oriented to person, place, time  Feet: appropriate footwear.       Goals      HEMOGLOBIN A1C < 7               Assessment/Plan  1. Type 2 diabetes mellitus with both eyes affected by proliferative retinopathy and macular edema, with long-term current use of insulin  -- Uncontrolled but glucoses sound to be improving with the  exception of pre-dinner readings. Likely having prandial excursions. I suggested trying Steglatro since coupon covers nearly entire cost of medication. She would rather resume Trulicity and apply the coupon. I explained that with FBG tightly controlled, she could stop the Lantus if she does resume Trulicity.   -- prior Trulicity dose was 3 mg weekly. Will resume at 1.5 mg weekly to start with the plan to increase back to 3 mg.   -- continue metformin  -- if able to resume Trulicity, hold Lantus.   -- BG monitoring 3x/day. Notify me if blood sugars remain >150 before meals after resuming Trulicity.    -- Discussed diagnosis of DM, A1c goals, progression of disease, long term complications and tx options.   2. Primary hypertension  -- acceptable  -- continue current meds and monitor       FOLLOW UP  Follow up in about 3 months (around 6/28/2022).   Patient instructed to bring BG logs to each follow up   Patient encouraged to call for any BG/medication issues, concerns, or questions.    Orders Placed This Encounter   Procedures    Hemoglobin A1C    Basic Metabolic Panel

## 2022-03-29 ENCOUNTER — TELEPHONE (OUTPATIENT)
Dept: HEMATOLOGY/ONCOLOGY | Facility: CLINIC | Age: 62
End: 2022-03-29
Payer: COMMERCIAL

## 2022-03-31 ENCOUNTER — TELEPHONE (OUTPATIENT)
Dept: HEMATOLOGY/ONCOLOGY | Facility: CLINIC | Age: 62
End: 2022-03-31
Payer: COMMERCIAL

## 2022-03-31 NOTE — TELEPHONE ENCOUNTER
Patient returned call about bone marrow scheduling. She stated she will call back when ready to set this up after her daughter has her baby. Provided my contact information

## 2022-04-11 ENCOUNTER — OFFICE VISIT (OUTPATIENT)
Dept: FAMILY MEDICINE | Facility: CLINIC | Age: 62
End: 2022-04-11
Payer: COMMERCIAL

## 2022-04-11 VITALS
DIASTOLIC BLOOD PRESSURE: 78 MMHG | WEIGHT: 193.69 LBS | OXYGEN SATURATION: 97 % | HEIGHT: 69 IN | RESPIRATION RATE: 20 BRPM | SYSTOLIC BLOOD PRESSURE: 131 MMHG | BODY MASS INDEX: 28.69 KG/M2 | TEMPERATURE: 99 F | HEART RATE: 86 BPM

## 2022-04-11 DIAGNOSIS — E11.8 DIABETES MELLITUS TYPE 2 WITH COMPLICATIONS: Primary | ICD-10-CM

## 2022-04-11 DIAGNOSIS — E11.59 HYPERTENSION ASSOCIATED WITH DIABETES: ICD-10-CM

## 2022-04-11 DIAGNOSIS — D72.819 LEUKOPENIA, UNSPECIFIED TYPE: ICD-10-CM

## 2022-04-11 DIAGNOSIS — I15.2 HYPERTENSION ASSOCIATED WITH DIABETES: ICD-10-CM

## 2022-04-11 DIAGNOSIS — D64.9 ANEMIA, UNSPECIFIED TYPE: ICD-10-CM

## 2022-04-11 PROCEDURE — 99214 PR OFFICE/OUTPT VISIT, EST, LEVL IV, 30-39 MIN: ICD-10-PCS | Mod: S$GLB,,, | Performed by: FAMILY MEDICINE

## 2022-04-11 PROCEDURE — 99214 OFFICE O/P EST MOD 30 MIN: CPT | Mod: S$GLB,,, | Performed by: FAMILY MEDICINE

## 2022-04-11 NOTE — PROGRESS NOTES
Subjective:       Patient ID: Nohelia Rodriguez is a 62 y.o. female.    Chief Complaint: Follow-up    HPI     The patient is a 62-year-old who is here today for short-term follow-up on her diabetes and hypertension.  Since I have seen her last, she has increased her gym time to 4 times a week up from her prior 3 times a week.  She is particularly enjoying going to the gym as she is bringing her oldest granddaughter with her.  Since our last visit, she has lost 6 lb.  She is still thinking about care home but has not made a decision yet.    Today we discussed the followin)  Diabetes.  She did meet with the diabetic educator and with the diabetic specialist.  She was started back on Trulicity and found a coupon which me this more affordable for her.  She is currently taking Trulicity 1.5 mg once a week, Lantus 10 units at night and Glucophage 1000 mg twice a day.  Her morning sugars have been consistently less than 140s.  Her lunch readings have been consistently less than 150s except for 1 reading that was elevated at 198.  Her dinner readings have been consistently less than 156 except for 1 reading that was 200. Her bedtime sugar readings have been consistently less than 150s.  She has not had any hypoglycemic episodes.   She has believes that her numbers have improved  2)  Hypertension.  Today her blood pressure is 131/78.  She is taking Norvasc, Coreg and Avalide.  She does not check her blood pressure outside the office  3) anemia and leukopenia.  Persistent.  We did review her workup with the Hematology Department.  She is going to be having a bone marrow biopsy once her daughter has her baby.      Review of Systems   Constitutional: Negative for appetite change, chills, diaphoresis, fatigue, fever and unexpected weight change.   HENT: Negative for congestion, ear pain, postnasal drip, rhinorrhea, sinus pressure, sneezing, sore throat and trouble swallowing.    Eyes: Negative for pain, discharge and visual  disturbance.   Respiratory: Negative for cough, chest tightness, shortness of breath and wheezing.    Cardiovascular: Negative for chest pain, palpitations and leg swelling.   Gastrointestinal: Negative for abdominal distention, abdominal pain, blood in stool, constipation, diarrhea, nausea and vomiting.   Skin: Negative for rash.       Objective:      Physical Exam  Constitutional:       General: She is not in acute distress.     Appearance: Normal appearance. She is well-developed.   HENT:      Head: Normocephalic and atraumatic.      Right Ear: Hearing, tympanic membrane, ear canal and external ear normal.      Left Ear: Hearing, tympanic membrane, ear canal and external ear normal.      Nose: Nose normal.      Mouth/Throat:      Mouth: No oral lesions.      Pharynx: No oropharyngeal exudate or posterior oropharyngeal erythema.   Eyes:      General: Lids are normal. No scleral icterus.     Extraocular Movements: Extraocular movements intact.      Conjunctiva/sclera: Conjunctivae normal.      Pupils: Pupils are equal, round, and reactive to light.   Neck:      Thyroid: No thyroid mass or thyromegaly.      Vascular: No carotid bruit.   Cardiovascular:      Rate and Rhythm: Normal rate and regular rhythm.  No extrasystoles are present.     Chest Wall: PMI is not displaced.      Heart sounds: Normal heart sounds. No murmur heard.    No gallop.   Pulmonary:      Effort: Pulmonary effort is normal. No accessory muscle usage or respiratory distress.      Breath sounds: Normal breath sounds.   Chest:   Breasts:      Right: No supraclavicular adenopathy.      Left: No supraclavicular adenopathy.       Abdominal:      General: Bowel sounds are normal. There is no abdominal bruit.      Palpations: Abdomen is soft.      Tenderness: There is no abdominal tenderness. There is no rebound.   Musculoskeletal:      Cervical back: Normal range of motion and neck supple.   Lymphadenopathy:      Head:      Right side of head: No  "submental or submandibular adenopathy.      Left side of head: No submental or submandibular adenopathy.      Cervical:      Right cervical: No superficial, deep or posterior cervical adenopathy.     Left cervical: No superficial, deep or posterior cervical adenopathy.      Upper Body:      Right upper body: No supraclavicular adenopathy.      Left upper body: No supraclavicular adenopathy.   Skin:     General: Skin is warm and dry.   Neurological:      Mental Status: She is alert and oriented to person, place, and time.       Blood pressure 131/78, pulse 86, temperature 98.7 °F (37.1 °C), resp. rate 20, height 5' 9" (1.753 m), weight 87.9 kg (193 lb 10.8 oz), SpO2 97 %.Body mass index is 28.6 kg/m².            A/P:  1)  Diabetes.  Improved control.  Continue with Trulicity 1.5 mg once a week, Lantus 10 units at night and Glucophage XR 1000 mg twice a day.  Continue with physical activity and healthy diet.  She will have a repeat A1c the and of June and see endocrinology shortly thereafter    2)  Hypertension.  Well controlled.  Continue with Norvasc, Coreg and Avalide.    3) anemia and leukopenia.  Persistent.  Follow-up with Hematology for bone marrow biopsy as planned    As long as she does well, I will see her back in 3 months or sooner if needed  "

## 2022-05-12 RX ORDER — AMLODIPINE BESYLATE 10 MG/1
TABLET ORAL
Qty: 30 TABLET | Refills: 2 | Status: SHIPPED | OUTPATIENT
Start: 2022-05-12 | End: 2022-08-25

## 2022-05-12 NOTE — TELEPHONE ENCOUNTER
Refill Routing Note   Medication(s) are not appropriate for processing by [facility] for the following reason(s):      - Medication requested has undergone a recent dosage adjustment (<3 months)    [facility] action(s):  Defer Medication-related problems identified: Dose adjustment        Medication reconciliation completed: No     Appointments  past 12m or future 3m with PCP    Date Provider   Last Visit   4/11/2022 Katelyn Barrett MD   Next Visit   7/11/2022 Katelyn Barrett MD   ED visits in past 90 days: 0        Note composed:7:09 PM 05/11/2022

## 2022-05-12 NOTE — TELEPHONE ENCOUNTER
No new care gaps identified.  [facility] Embedded Care Gaps. Reference number: [id number]. 5/11/2022   7:03:51 PM CDT

## 2022-06-27 ENCOUNTER — LAB VISIT (OUTPATIENT)
Dept: LAB | Facility: HOSPITAL | Age: 62
End: 2022-06-27
Attending: FAMILY MEDICINE
Payer: COMMERCIAL

## 2022-06-27 DIAGNOSIS — Z79.4 TYPE 2 DIABETES MELLITUS WITH BOTH EYES AFFECTED BY PROLIFERATIVE RETINOPATHY AND MACULAR EDEMA, WITH LONG-TERM CURRENT USE OF INSULIN: ICD-10-CM

## 2022-06-27 DIAGNOSIS — E11.3513 TYPE 2 DIABETES MELLITUS WITH BOTH EYES AFFECTED BY PROLIFERATIVE RETINOPATHY AND MACULAR EDEMA, WITH LONG-TERM CURRENT USE OF INSULIN: ICD-10-CM

## 2022-06-27 LAB
ANION GAP SERPL CALC-SCNC: 8 MMOL/L (ref 8–16)
BUN SERPL-MCNC: 22 MG/DL (ref 8–23)
CALCIUM SERPL-MCNC: 10.2 MG/DL (ref 8.7–10.5)
CHLORIDE SERPL-SCNC: 105 MMOL/L (ref 95–110)
CO2 SERPL-SCNC: 29 MMOL/L (ref 23–29)
CREAT SERPL-MCNC: 1 MG/DL (ref 0.5–1.4)
EST. GFR  (AFRICAN AMERICAN): >60 ML/MIN/1.73 M^2
EST. GFR  (NON AFRICAN AMERICAN): >60 ML/MIN/1.73 M^2
ESTIMATED AVG GLUCOSE: 169 MG/DL (ref 68–131)
GLUCOSE SERPL-MCNC: 88 MG/DL (ref 70–110)
HBA1C MFR BLD: 7.5 % (ref 4–5.6)
POTASSIUM SERPL-SCNC: 4.3 MMOL/L (ref 3.5–5.1)
SODIUM SERPL-SCNC: 142 MMOL/L (ref 136–145)

## 2022-06-27 PROCEDURE — 80048 BASIC METABOLIC PNL TOTAL CA: CPT | Performed by: NURSE PRACTITIONER

## 2022-06-27 PROCEDURE — 36415 COLL VENOUS BLD VENIPUNCTURE: CPT | Mod: PO | Performed by: NURSE PRACTITIONER

## 2022-06-27 PROCEDURE — 83036 HEMOGLOBIN GLYCOSYLATED A1C: CPT | Performed by: NURSE PRACTITIONER

## 2022-07-08 RX ORDER — METFORMIN HYDROCHLORIDE 1000 MG/1
1000 TABLET ORAL 2 TIMES DAILY WITH MEALS
Qty: 180 TABLET | Refills: 0 | Status: SHIPPED | OUTPATIENT
Start: 2022-07-08 | End: 2023-01-06

## 2022-07-08 RX ORDER — METFORMIN HYDROCHLORIDE 1000 MG/1
1000 TABLET ORAL 2 TIMES DAILY WITH MEALS
Qty: 180 TABLET | Refills: 0 | OUTPATIENT
Start: 2022-07-08

## 2022-07-08 RX ORDER — ROSUVASTATIN CALCIUM 10 MG/1
TABLET, COATED ORAL
Qty: 90 TABLET | Refills: 2 | Status: SHIPPED | OUTPATIENT
Start: 2022-07-08 | End: 2023-04-06

## 2022-07-08 NOTE — TELEPHONE ENCOUNTER
Refill Decision Note   Nohelia Rodriguez  is requesting a refill authorization.  Brief Assessment and Rationale for Refill:  Quick Discontinue  Approve     Medication Therapy Plan:  QDC metformin - duplicate. Approve crestor.    Medication Reconciliation Completed: No   Comments:     No Care Gaps recommended.     Note composed:12:59 PM 07/08/2022

## 2022-07-08 NOTE — TELEPHONE ENCOUNTER
No new care gaps identified.  [facility] Embedded Care Gaps. Reference number: [id number]. 7/08/2022   5:54:35 AM CDT

## 2022-07-11 ENCOUNTER — OFFICE VISIT (OUTPATIENT)
Dept: ENDOCRINOLOGY | Facility: CLINIC | Age: 62
End: 2022-07-11
Payer: COMMERCIAL

## 2022-07-11 ENCOUNTER — OFFICE VISIT (OUTPATIENT)
Dept: FAMILY MEDICINE | Facility: CLINIC | Age: 62
End: 2022-07-11
Payer: COMMERCIAL

## 2022-07-11 ENCOUNTER — TELEPHONE (OUTPATIENT)
Dept: FAMILY MEDICINE | Facility: CLINIC | Age: 62
End: 2022-07-11
Payer: COMMERCIAL

## 2022-07-11 VITALS
BODY MASS INDEX: 29.36 KG/M2 | SYSTOLIC BLOOD PRESSURE: 100 MMHG | HEIGHT: 69 IN | HEART RATE: 85 BPM | DIASTOLIC BLOOD PRESSURE: 60 MMHG | WEIGHT: 198.19 LBS

## 2022-07-11 VITALS
HEIGHT: 69 IN | BODY MASS INDEX: 29.47 KG/M2 | OXYGEN SATURATION: 97 % | SYSTOLIC BLOOD PRESSURE: 102 MMHG | TEMPERATURE: 99 F | RESPIRATION RATE: 20 BRPM | DIASTOLIC BLOOD PRESSURE: 68 MMHG | WEIGHT: 198.94 LBS | HEART RATE: 92 BPM

## 2022-07-11 DIAGNOSIS — E11.8 DIABETES MELLITUS TYPE 2 WITH COMPLICATIONS: ICD-10-CM

## 2022-07-11 DIAGNOSIS — E11.59 HYPERTENSION ASSOCIATED WITH DIABETES: Primary | ICD-10-CM

## 2022-07-11 DIAGNOSIS — Z79.4 TYPE 2 DIABETES MELLITUS WITH BOTH EYES AFFECTED BY PROLIFERATIVE RETINOPATHY AND MACULAR EDEMA, WITH LONG-TERM CURRENT USE OF INSULIN: Primary | ICD-10-CM

## 2022-07-11 DIAGNOSIS — I10 PRIMARY HYPERTENSION: ICD-10-CM

## 2022-07-11 DIAGNOSIS — D72.819 LEUKOPENIA, UNSPECIFIED TYPE: ICD-10-CM

## 2022-07-11 DIAGNOSIS — D64.9 ANEMIA, UNSPECIFIED TYPE: ICD-10-CM

## 2022-07-11 DIAGNOSIS — E11.3513 TYPE 2 DIABETES MELLITUS WITH BOTH EYES AFFECTED BY PROLIFERATIVE RETINOPATHY AND MACULAR EDEMA, WITH LONG-TERM CURRENT USE OF INSULIN: Primary | ICD-10-CM

## 2022-07-11 DIAGNOSIS — I15.2 HYPERTENSION ASSOCIATED WITH DIABETES: Primary | ICD-10-CM

## 2022-07-11 PROCEDURE — 99214 OFFICE O/P EST MOD 30 MIN: CPT | Mod: S$GLB,,, | Performed by: FAMILY MEDICINE

## 2022-07-11 PROCEDURE — 93010 ELECTROCARDIOGRAM REPORT: CPT | Mod: S$GLB,,, | Performed by: INTERNAL MEDICINE

## 2022-07-11 PROCEDURE — 99214 PR OFFICE/OUTPT VISIT, EST, LEVL IV, 30-39 MIN: ICD-10-PCS | Mod: S$GLB,,, | Performed by: FAMILY MEDICINE

## 2022-07-11 PROCEDURE — 99999 PR PBB SHADOW E&M-EST. PATIENT-LVL IV: ICD-10-PCS | Mod: PBBFAC,,, | Performed by: NURSE PRACTITIONER

## 2022-07-11 PROCEDURE — 99999 PR PBB SHADOW E&M-EST. PATIENT-LVL IV: CPT | Mod: PBBFAC,,, | Performed by: NURSE PRACTITIONER

## 2022-07-11 PROCEDURE — 93005 ELECTROCARDIOGRAM TRACING: CPT | Mod: S$GLB,,, | Performed by: FAMILY MEDICINE

## 2022-07-11 PROCEDURE — 93010 EKG 12-LEAD: ICD-10-PCS | Mod: S$GLB,,, | Performed by: INTERNAL MEDICINE

## 2022-07-11 PROCEDURE — 99214 PR OFFICE/OUTPT VISIT, EST, LEVL IV, 30-39 MIN: ICD-10-PCS | Mod: S$GLB,,, | Performed by: NURSE PRACTITIONER

## 2022-07-11 PROCEDURE — 93005 EKG 12-LEAD: ICD-10-PCS | Mod: S$GLB,,, | Performed by: FAMILY MEDICINE

## 2022-07-11 PROCEDURE — 99214 OFFICE O/P EST MOD 30 MIN: CPT | Mod: S$GLB,,, | Performed by: NURSE PRACTITIONER

## 2022-07-11 RX ORDER — DULAGLUTIDE 3 MG/.5ML
3 INJECTION, SOLUTION SUBCUTANEOUS
Qty: 4 PEN | Refills: 11 | Status: SHIPPED | OUTPATIENT
Start: 2022-07-11 | End: 2022-10-10

## 2022-07-11 NOTE — TELEPHONE ENCOUNTER
----- Message from Darlyn Sebastian sent at 7/11/2022  7:19 AM CDT -----  Name Of Caller: Nohelia     Provider Name: Katelyn Schallen,    Does patient feel the need to be seen today? Has one at 1:20pm     Relationship to the Pt?: pt    Contact Preference?: [phone]    What is the nature of the call?:Pt calling and would like medical advice regarding she being around her daughter. She said she came back home last Monday from visiting her daughter and she called her last Thursday telling her she tested positive. She does not have any symptoms at all but wants to know if she should get tested and she has an appt with you today      Please call her back as soon as possible

## 2022-07-11 NOTE — PATIENT INSTRUCTIONS
Your provider has requested that you make an appointment with Dermatology, to  schedule an appointment, please contact one of the following providers:    Dr. Wero Ye & MD Dr. Bela Travis, PA  [address] [place]  [address]  Phone:  [phone]    Dr. Jacqueline Leavitt & Dr. Ana Sung   [address] [place]  [address]  Phone: [phone]    Dr. Wero Martínez & Lani Edmondson, PA  [address], [address]  [place]   [address]  Phone: [phone]    Dr. Corina Fortune  [address] [place]   [address]  Phone: [phone]    [facility]  [id number]  [place] [address]  [phone]    Leeann Funez, MD Rosendo Redding MD Thomas Orgeron, MD Joel Perdomo, MD Steven Shapiro, MD Thuy Tran, PA  Kelly Villalobos, MARILEE Prakash, FNP  Angelina Anglin, FNP  Reji Mccord III, FNP  Drew Potter, FNP  More Reed, FNP  Savannah Faria, FNP  Bela Booker, FNP  Che Kent, FNP  Netta Shine, FNP

## 2022-07-11 NOTE — PROGRESS NOTES
"CC: Ms. Nohelia Rodriguez arrives today for management of Type 2 DM and review of chronic medical conditions, as listed in the Visit Diagnosis section of this encounter.       HPI: Ms. Nohelia Rodriguez was diagnosed with Type 2 DM in her 40s. She did have GDM ~ 10 years prior to Type 2 DM diagnosis. She was diagnosed based on lab work. Initial treatment consisted of orals. + FH of DM in mother, 3 sisters, 2 brothers. Denies hospitalizations due to DM.     Patient was initially seen by me in March. At this time, Trulicity was resumed. We had discussed discontinuing Lantus but she is still taking.    BG readings are checked 3x/day.  Reports the following:   Fastin-115  Bedtime (2 hours after dinner): 140-150    Hypoglycemia: No, but feels symptomatic with BG < 90.    Missing Insulin/PO medication doses: No    Exercise: Yes - goes to gym 3 days/week.     Dietary Habits: Eats 2-3 meals/day. May skip lunch or dinner. Avoids suary beverages.     Last DM education appointment: 3/14/2022      CURRENT DIABETIC MEDS: metformin 1000 mg BID, Trulicity 1.5 mg weekly, Lantus 10 units QAM  Vial or pen: pen  Glucometer type:     Previous DM treatments:  Glyburide    Last Eye Exam: 2021, + DR. Jessica Herrera  Last Podiatry Exam: 2021    REVIEW OF SYSTEMS  Constitutional: no c/o fatigue, weakness, or weight loss.   Eyes: denies visual disturbances.  Cardiac: no palpitations or chest pain.  Respiratory: no cough or dyspnea.  GI: no c/o abdominal pain or nausea. Denies h/o pancreatitis   Skin: no lesions or rashes.  Neuro: no numbness, tingling, or parasthesias.  Endocrine: denies polyphagia, polydipsia, polyuria      Personally reviewed Past Medical, Surgical, Social History.    Vital Signs  /60   Pulse 85   Ht 5' 9" (1.753 m)   Wt 89.9 kg (198 lb 3.1 oz)   BMI 29.27 kg/m²     Personally reviewed the below labs:    Hemoglobin A1C   Date Value Ref Range Status   2022 7.5 (H) 4.0 - 5.6 % Final     Comment:     ADA " Screening Guidelines:  5.7-6.4%  Consistent with prediabetes  >or=6.5%  Consistent with diabetes    High levels of fetal hemoglobin interfere with the HbA1C  assay. Heterozygous hemoglobin variants (HbS, HgC, etc)do  not significantly interfere with this assay.   However, presence of multiple variants may affect accuracy.     03/07/2022 9.0 (H) 4.0 - 5.6 % Final     Comment:     ADA Screening Guidelines:  5.7-6.4%  Consistent with prediabetes  >or=6.5%  Consistent with diabetes    High levels of fetal hemoglobin interfere with the HbA1C  assay. Heterozygous hemoglobin variants (HbS, HgC, etc)do  not significantly interfere with this assay.   However, presence of multiple variants may affect accuracy.     12/06/2021 8.3 (H) 4.0 - 5.6 % Final     Comment:     ADA Screening Guidelines:  5.7-6.4%  Consistent with prediabetes  >or=6.5%  Consistent with diabetes    High levels of fetal hemoglobin interfere with the HbA1C  assay. Heterozygous hemoglobin variants (HbS, HgC, etc)do  not significantly interfere with this assay.   However, presence of multiple variants may affect accuracy.         Chemistry        Component Value Date/Time     06/27/2022 0954    K 4.3 06/27/2022 0954     06/27/2022 0954    CO2 29 06/27/2022 0954    BUN 22 06/27/2022 0954    CREATININE 1.0 06/27/2022 0954    GLU 88 06/27/2022 0954        Component Value Date/Time    CALCIUM 10.2 06/27/2022 0954    ALKPHOS 51 (L) 03/07/2022 0907    AST 19 03/07/2022 0907    ALT 12 03/07/2022 0907    BILITOT 0.4 03/07/2022 0907    ESTGFRAFRICA >60.0 06/27/2022 0954    EGFRNONAA >60.0 06/27/2022 0954          Lab Results   Component Value Date    CHOL 127 03/07/2022    CHOL 136 04/01/2021    CHOL 112 (L) 05/15/2020     Lab Results   Component Value Date    HDL 60 03/07/2022    HDL 49 04/01/2021    HDL 43 05/15/2020     Lab Results   Component Value Date    LDLCALC 52.8 (L) 03/07/2022    LDLCALC 75.4 04/01/2021    LDLCALC 54.2 (L) 05/15/2020     Lab  Results   Component Value Date    TRIG 71 03/07/2022    TRIG 58 04/01/2021    TRIG 74 05/15/2020     Lab Results   Component Value Date    CHOLHDL 47.2 03/07/2022    CHOLHDL 36.0 04/01/2021    CHOLHDL 38.4 05/15/2020       Lab Results   Component Value Date    MICALBCREAT 16.7 03/07/2022     Lab Results   Component Value Date    TSH 0.504 04/01/2021       CrCl cannot be calculated (Patient's most recent lab result is older than the maximum 7 days allowed.).    Vit D, 25-Hydroxy   Date Value Ref Range Status   05/25/2019 36 30 - 96 ng/mL Final     Comment:     Vitamin D deficiency.........<10 ng/mL                              Vitamin D insufficiency......10-29 ng/mL       Vitamin D sufficiency........> or equal to 30 ng/mL  Vitamin D toxicity............>100 ng/mL           PHYSICAL EXAMINATION  Constitutional: Appears well, no distress  Neck: Supple, trachea midline.  Respiratory: CTA, even and unlabored.  Cardiovascular: RRR, no murmurs, no carotid bruits.    GI: no hernia noted  Skin: warm and dry; no visible wounds  Neuro: oriented to person, place, time  Feet: appropriate footwear.       Goals      HEMOGLOBIN A1C < 7               Assessment/Plan  1. Type 2 diabetes mellitus with both eyes affected by proliferative retinopathy and macular edema, with long-term current use of insulin  -- Improving. Will discontinue insulin and increase Trulicity since she previously tolerated 3 mg weekly.   -- increase Trulicity to 3 mg weekly.  -- stop Lantus  -- continue metformin  -- BG monitoring 1x/day.     -- Discussed diagnosis of DM, A1c goals, progression of disease, long term complications and tx options.   2. Primary hypertension  -- controlled  -- continue current meds       FOLLOW UP  Follow up in about 3 months (around 10/11/2022).   Patient instructed to bring BG logs to each follow up   Patient encouraged to call for any BG/medication issues, concerns, or questions.    Orders Placed This Encounter   Procedures     Hemoglobin A1C    Comprehensive Metabolic Panel

## 2022-07-17 ENCOUNTER — PATIENT MESSAGE (OUTPATIENT)
Dept: FAMILY MEDICINE | Facility: CLINIC | Age: 62
End: 2022-07-17
Payer: COMMERCIAL

## 2022-07-17 NOTE — PROGRESS NOTES
Subjective:       Patient ID: Nohelia Rodriguez is a 62 y.o. female.    Chief Complaint: Follow-up    HPI   The patient is a 62-year-old who is here today for her 3 month follow-up.  Overall, she is doing well.  Today we discussed the followin)  Diabetes.  Her recent A1c was 7.5 down from a prior A1c of 9.0.  Her morning sugars have ranged from  and her evening sugars have been in the 120s to 145. She is following with the endocrinology team and saw them just prior to our visit.  The plan is to stop her Lantus, continue with Glucophage and increase Trulicity.  (Of note, she is now able to afford the Trulicity.)  2) hypertension.  Today her blood pressure with me is 102/68  and her blood pressure earlier this morning with the endocrinology team was 100/60.  Her blood pressures at home have been low as well.  She is currently taking Atacand, Coreg and Norvasc.    Review of Systems   Constitutional: Negative for appetite change, chills, diaphoresis, fatigue, fever and unexpected weight change.   HENT: Negative for congestion, ear pain, postnasal drip, rhinorrhea, sinus pressure, sneezing, sore throat and trouble swallowing.    Eyes: Negative for pain, discharge and visual disturbance.   Respiratory: Negative for cough, chest tightness, shortness of breath and wheezing.    Cardiovascular: Negative for chest pain, palpitations and leg swelling.   Gastrointestinal: Negative for abdominal distention, abdominal pain, blood in stool, constipation, diarrhea, nausea and vomiting.   Skin: Negative for rash.       Objective:      Physical Exam  Constitutional:       General: She is not in acute distress.     Appearance: Normal appearance. She is well-developed.   HENT:      Head: Normocephalic and atraumatic.      Right Ear: Hearing, tympanic membrane, ear canal and external ear normal.      Left Ear: Hearing, tympanic membrane, ear canal and external ear normal.      Nose: Nose normal.      Mouth/Throat:      Mouth: No  oral lesions.      Pharynx: No oropharyngeal exudate or posterior oropharyngeal erythema.   Eyes:      General: Lids are normal. No scleral icterus.     Extraocular Movements: Extraocular movements intact.      Conjunctiva/sclera: Conjunctivae normal.      Pupils: Pupils are equal, round, and reactive to light.   Neck:      Thyroid: No thyroid mass or thyromegaly.      Vascular: No carotid bruit.   Cardiovascular:      Rate and Rhythm: Normal rate and regular rhythm.  No extrasystoles are present.     Chest Wall: PMI is not displaced.      Pulses:           Dorsalis pedis pulses are 2+ on the right side and 2+ on the left side.        Posterior tibial pulses are 2+ on the right side and 2+ on the left side.      Heart sounds: Normal heart sounds. No murmur heard.    No gallop.   Pulmonary:      Effort: Pulmonary effort is normal. No accessory muscle usage or respiratory distress.      Breath sounds: Normal breath sounds.   Chest:   Breasts:      Right: No supraclavicular adenopathy.      Left: No supraclavicular adenopathy.       Abdominal:      General: Bowel sounds are normal. There is no abdominal bruit.      Palpations: Abdomen is soft.      Tenderness: There is no abdominal tenderness. There is no rebound.   Musculoskeletal:      Cervical back: Normal range of motion and neck supple.      Right foot: No deformity.      Left foot: No deformity.   Feet:      Right foot:      Protective Sensation: 10 sites tested. 10 sites sensed.      Skin integrity: Warmth present. No ulcer or callus.      Left foot:      Protective Sensation: 10 sites tested. 10 sites sensed.      Skin integrity: Warmth present. No ulcer or callus.   Lymphadenopathy:      Head:      Right side of head: No submental or submandibular adenopathy.      Left side of head: No submental or submandibular adenopathy.      Cervical:      Right cervical: No superficial, deep or posterior cervical adenopathy.     Left cervical: No superficial, deep or  "posterior cervical adenopathy.      Upper Body:      Right upper body: No supraclavicular adenopathy.      Left upper body: No supraclavicular adenopathy.   Skin:     General: Skin is warm and dry.   Neurological:      Mental Status: She is alert and oriented to person, place, and time.       Blood pressure 102/68, pulse 92, temperature 99.2 °F (37.3 °C), resp. rate 20, height 5' 9" (1.753 m), weight 90.2 kg (198 lb 15.4 oz), SpO2 97 %.Body mass index is 29.38 kg/m².          A/P:  1) diabetes.  Well controlled.  Follow-up endocrinology as planned.  Continue with Trulicity and Glucophage  2) hypertension.  Over controlled.  We are going to decrease the Norvasc from 10 mg to 5 mg once a day.  She will continue with the Atacand and Coreg.  We will check a baseline EKG as we have not yet done that  3) leukopenia and anemia.  We did discuss her recent hematologic evaluation and the recommendations for a bone marrow biopsy.  She will schedule the bone marrow biopsy with the hematology team soon       As long as she does well, she will follow-up with the endocrinology team as planned and I will see her back in 6 months or sooner if needed       "

## 2022-08-02 NOTE — TELEPHONE ENCOUNTER
----- Message from Shellie Hernandez sent at 2/14/2022  5:04 PM CST -----  Contact: Patient  Patient phone in stated received a letter due for mammogram and urine microalbumin     No order on active request     Please assist    Patient can be reach at [phone]     You can access the FollowMyHealth Patient Portal offered by [facility] by registering at the following website: [email or web address]. By joining [facility] [employer]’s FollowMyHealth portal, you will also be able to view your health information using other applications (apps) compatible with our system.

## 2022-08-25 RX ORDER — AMLODIPINE BESYLATE 10 MG/1
TABLET ORAL
Qty: 90 TABLET | Refills: 3 | Status: SHIPPED | OUTPATIENT
Start: 2022-08-25 | End: 2023-12-06

## 2022-08-25 NOTE — TELEPHONE ENCOUNTER
Refill Decision Note   Nohelia Rodriguez  is requesting a refill authorization.  Brief Assessment and Rationale for Refill:  Approve     Medication Therapy Plan:       Medication Reconciliation Completed: No   Comments:     No Care Gaps recommended.     Note composed:9:10 AM 08/25/2022

## 2022-08-25 NOTE — TELEPHONE ENCOUNTER
No new care gaps identified.  [facility] Embedded Care Gaps. Reference number: [id number]. 8/24/2022   8:19:29 PM CDT

## 2022-10-03 ENCOUNTER — LAB VISIT (OUTPATIENT)
Dept: LAB | Facility: HOSPITAL | Age: 62
End: 2022-10-03
Attending: NURSE PRACTITIONER
Payer: COMMERCIAL

## 2022-10-03 DIAGNOSIS — E11.3513 TYPE 2 DIABETES MELLITUS WITH BOTH EYES AFFECTED BY PROLIFERATIVE RETINOPATHY AND MACULAR EDEMA, WITH LONG-TERM CURRENT USE OF INSULIN: ICD-10-CM

## 2022-10-03 DIAGNOSIS — Z79.4 TYPE 2 DIABETES MELLITUS WITH BOTH EYES AFFECTED BY PROLIFERATIVE RETINOPATHY AND MACULAR EDEMA, WITH LONG-TERM CURRENT USE OF INSULIN: ICD-10-CM

## 2022-10-03 LAB
ALBUMIN SERPL BCP-MCNC: 3.9 G/DL (ref 3.5–5.2)
ALP SERPL-CCNC: 42 U/L (ref 55–135)
ALT SERPL W/O P-5'-P-CCNC: 12 U/L (ref 10–44)
ANION GAP SERPL CALC-SCNC: 10 MMOL/L (ref 8–16)
AST SERPL-CCNC: 16 U/L (ref 10–40)
BILIRUB SERPL-MCNC: 0.4 MG/DL (ref 0.1–1)
BUN SERPL-MCNC: 13 MG/DL (ref 8–23)
CALCIUM SERPL-MCNC: 10.3 MG/DL (ref 8.7–10.5)
CHLORIDE SERPL-SCNC: 103 MMOL/L (ref 95–110)
CO2 SERPL-SCNC: 25 MMOL/L (ref 23–29)
CREAT SERPL-MCNC: 0.9 MG/DL (ref 0.5–1.4)
EST. GFR  (NO RACE VARIABLE): >60 ML/MIN/1.73 M^2
ESTIMATED AVG GLUCOSE: 169 MG/DL (ref 68–131)
GLUCOSE SERPL-MCNC: 116 MG/DL (ref 70–110)
HBA1C MFR BLD: 7.5 % (ref 4–5.6)
POTASSIUM SERPL-SCNC: 4.1 MMOL/L (ref 3.5–5.1)
PROT SERPL-MCNC: 7.4 G/DL (ref 6–8.4)
SODIUM SERPL-SCNC: 138 MMOL/L (ref 136–145)

## 2022-10-03 PROCEDURE — 36415 COLL VENOUS BLD VENIPUNCTURE: CPT | Mod: PO | Performed by: NURSE PRACTITIONER

## 2022-10-03 PROCEDURE — 83036 HEMOGLOBIN GLYCOSYLATED A1C: CPT | Performed by: NURSE PRACTITIONER

## 2022-10-03 PROCEDURE — 80053 COMPREHEN METABOLIC PANEL: CPT | Performed by: NURSE PRACTITIONER

## 2022-10-10 ENCOUNTER — OFFICE VISIT (OUTPATIENT)
Dept: ENDOCRINOLOGY | Facility: CLINIC | Age: 62
End: 2022-10-10
Payer: COMMERCIAL

## 2022-10-10 ENCOUNTER — TELEPHONE (OUTPATIENT)
Dept: ENDOCRINOLOGY | Facility: CLINIC | Age: 62
End: 2022-10-10

## 2022-10-10 VITALS
HEIGHT: 69 IN | BODY MASS INDEX: 30.08 KG/M2 | SYSTOLIC BLOOD PRESSURE: 110 MMHG | DIASTOLIC BLOOD PRESSURE: 60 MMHG | HEART RATE: 84 BPM | WEIGHT: 203.06 LBS

## 2022-10-10 DIAGNOSIS — I10 PRIMARY HYPERTENSION: ICD-10-CM

## 2022-10-10 DIAGNOSIS — Z79.4 TYPE 2 DIABETES MELLITUS WITH BOTH EYES AFFECTED BY PROLIFERATIVE RETINOPATHY AND MACULAR EDEMA, WITH LONG-TERM CURRENT USE OF INSULIN: Primary | ICD-10-CM

## 2022-10-10 DIAGNOSIS — E11.3513 TYPE 2 DIABETES MELLITUS WITH BOTH EYES AFFECTED BY PROLIFERATIVE RETINOPATHY AND MACULAR EDEMA, WITH LONG-TERM CURRENT USE OF INSULIN: Primary | ICD-10-CM

## 2022-10-10 PROCEDURE — 99214 PR OFFICE/OUTPT VISIT, EST, LEVL IV, 30-39 MIN: ICD-10-PCS | Mod: S$GLB,,, | Performed by: NURSE PRACTITIONER

## 2022-10-10 PROCEDURE — 99999 PR PBB SHADOW E&M-EST. PATIENT-LVL IV: ICD-10-PCS | Mod: PBBFAC,,, | Performed by: NURSE PRACTITIONER

## 2022-10-10 PROCEDURE — 99999 PR PBB SHADOW E&M-EST. PATIENT-LVL IV: CPT | Mod: PBBFAC,,, | Performed by: NURSE PRACTITIONER

## 2022-10-10 PROCEDURE — 99214 OFFICE O/P EST MOD 30 MIN: CPT | Mod: S$GLB,,, | Performed by: NURSE PRACTITIONER

## 2022-10-10 RX ORDER — DULAGLUTIDE 4.5 MG/.5ML
4.5 INJECTION, SOLUTION SUBCUTANEOUS
Qty: 4 PEN | Refills: 11 | Status: SHIPPED | OUTPATIENT
Start: 2022-10-10 | End: 2022-12-05 | Stop reason: SDUPTHER

## 2022-10-10 NOTE — PROGRESS NOTES
"CC: Ms. Nohelia Rodriguez arrives today for management of Type 2 DM and review of chronic medical conditions, as listed in the Visit Diagnosis section of this encounter.       HPI: Ms. Nohelia Rodriguez was diagnosed with Type 2 DM in her 40s. She did have GDM ~ 10 years prior to Type 2 DM diagnosis. She was diagnosed based on lab work. Initial treatment consisted of orals. + FH of DM in mother, 3 sisters, 2 brothers. Denies hospitalizations due to DM.     Patient was initially seen by me in July. At this time, Lantus was discontinued and Trulicity dose was increased.     BG readings are checked 1x/day.  Reports the following:   Fastin-130    Hypoglycemia: No    Missing Insulin/PO medication doses: No    Exercise: Yes - goes to gym 3 days/week.     Dietary Habits: Eats 3 meals/day. Some meals may be more of a snack if she is not hungry. Avoids sugary beverages.     Last DM education appointment: 3/14/2022    She recently returned from vacation for 10 days.       CURRENT DIABETIC MEDS: metformin 1000 mg BID, Trulicity 3 mg weekly  Vial or pen: pen  Glucometer type:     Previous DM treatments:  Glyburide  Lantus    Last Eye Exam: 2021, + DR. Jessica Herrera  Last Podiatry Exam: 2021    REVIEW OF SYSTEMS  Constitutional: no c/o fatigue, weakness, or weight loss. However, reports that she has lost inches.   Eyes: denies visual disturbances.  Cardiac: no palpitations or chest pain.  Respiratory: no cough or dyspnea.  GI: no c/o abdominal pain or nausea. Denies h/o pancreatitis   Skin: no lesions or rashes.  Neuro: no numbness, tingling, or parasthesias.  Endocrine: denies polyphagia, polydipsia, polyuria      Personally reviewed Past Medical, Surgical, Social History.    Vital Signs  /60   Pulse 84   Ht 5' 9" (1.753 m)   Wt 92.1 kg (203 lb 0.7 oz)   BMI 29.98 kg/m²     Personally reviewed the below labs:    Hemoglobin A1C   Date Value Ref Range Status   10/03/2022 7.5 (H) 4.0 - 5.6 % Final     Comment:     " ADA Screening Guidelines:  5.7-6.4%  Consistent with prediabetes  >or=6.5%  Consistent with diabetes    High levels of fetal hemoglobin interfere with the HbA1C  assay. Heterozygous hemoglobin variants (HbS, HgC, etc)do  not significantly interfere with this assay.   However, presence of multiple variants may affect accuracy.     06/27/2022 7.5 (H) 4.0 - 5.6 % Final     Comment:     ADA Screening Guidelines:  5.7-6.4%  Consistent with prediabetes  >or=6.5%  Consistent with diabetes    High levels of fetal hemoglobin interfere with the HbA1C  assay. Heterozygous hemoglobin variants (HbS, HgC, etc)do  not significantly interfere with this assay.   However, presence of multiple variants may affect accuracy.     03/07/2022 9.0 (H) 4.0 - 5.6 % Final     Comment:     ADA Screening Guidelines:  5.7-6.4%  Consistent with prediabetes  >or=6.5%  Consistent with diabetes    High levels of fetal hemoglobin interfere with the HbA1C  assay. Heterozygous hemoglobin variants (HbS, HgC, etc)do  not significantly interfere with this assay.   However, presence of multiple variants may affect accuracy.         Chemistry        Component Value Date/Time     10/03/2022 1106    K 4.1 10/03/2022 1106     10/03/2022 1106    CO2 25 10/03/2022 1106    BUN 13 10/03/2022 1106    CREATININE 0.9 10/03/2022 1106     (H) 10/03/2022 1106        Component Value Date/Time    CALCIUM 10.3 10/03/2022 1106    ALKPHOS 42 (L) 10/03/2022 1106    AST 16 10/03/2022 1106    ALT 12 10/03/2022 1106    BILITOT 0.4 10/03/2022 1106    ESTGFRAFRICA >60.0 06/27/2022 0954    EGFRNONAA >60.0 06/27/2022 0954          Lab Results   Component Value Date    CHOL 127 03/07/2022    CHOL 136 04/01/2021    CHOL 112 (L) 05/15/2020     Lab Results   Component Value Date    HDL 60 03/07/2022    HDL 49 04/01/2021    HDL 43 05/15/2020     Lab Results   Component Value Date    LDLCALC 52.8 (L) 03/07/2022    LDLCALC 75.4 04/01/2021    LDLCALC 54.2 (L) 05/15/2020      Lab Results   Component Value Date    TRIG 71 03/07/2022    TRIG 58 04/01/2021    TRIG 74 05/15/2020     Lab Results   Component Value Date    CHOLHDL 47.2 03/07/2022    CHOLHDL 36.0 04/01/2021    CHOLHDL 38.4 05/15/2020       Lab Results   Component Value Date    MICALBCREAT 16.7 03/07/2022     Lab Results   Component Value Date    TSH 0.504 04/01/2021       CrCl cannot be calculated (Unknown ideal weight.).    Vit D, 25-Hydroxy   Date Value Ref Range Status   05/25/2019 36 30 - 96 ng/mL Final     Comment:     Vitamin D deficiency.........<10 ng/mL                              Vitamin D insufficiency......10-29 ng/mL       Vitamin D sufficiency........> or equal to 30 ng/mL  Vitamin D toxicity............>100 ng/mL           PHYSICAL EXAMINATION  Constitutional: Appears well, no distress  Neck: Supple, trachea midline.  Respiratory: CTA, even and unlabored.  Cardiovascular: RRR, no murmurs, no carotid bruits.    GI: no hernia noted  Skin: warm and dry; no visible wounds  Neuro: oriented to person, place, time  Feet: appropriate footwear.       Goals        HEMOGLOBIN A1C < 7                 Assessment/Plan  1. Type 2 diabetes mellitus with both eyes affected by proliferative retinopathy and macular edema, with long-term current use of insulin  -- Stable but above goal. We discussed increasing Trulicity or changing to either Ozempic or Mounjaro. She would rather increase Trulicity.  -- increase Trulicity to 4.5 mg weekly.  -- continue metformin  -- BG monitoring 1x/day.     -- Discussed diagnosis of DM, A1c goals, progression of disease, long term complications and tx options.   2. Primary hypertension  -- controlled  -- continue current meds       FOLLOW UP  Follow up in about 3 months (around 1/10/2023).   Patient instructed to bring BG logs to each follow up   Patient encouraged to call for any BG/medication issues, concerns, or questions.    Orders Placed This Encounter   Procedures    Hemoglobin A1C

## 2022-10-10 NOTE — TELEPHONE ENCOUNTER
S/w [facility]. Confirmed pt was seen by GIL Hargrove today and was instructed to increase Trulicity to 4.5 mg weekly and that this change is correct. Verbalized understanding.

## 2022-10-10 NOTE — TELEPHONE ENCOUNTER
----- Message from Tamia Butler sent at 10/10/2022  1:17 PM CDT -----  Contact: [facility]  Type:  Patient Call          Who Called:Sharri warren         Does the patient know what this is regarding?: requesting a call back to confirm the mg on the pt Rx dulaglutide (TRULICITY) 4.5 mg/0.5 mL pen injector ;Pt normally get 1.5 mg               Additional Information:  [facility] - [place], [place] - [address]  [address]  [facility] [address]  Phone: [phone] Fax: [phone]

## 2022-11-29 ENCOUNTER — TELEPHONE (OUTPATIENT)
Dept: OPHTHALMOLOGY | Facility: CLINIC | Age: 62
End: 2022-11-29
Payer: COMMERCIAL

## 2022-11-29 NOTE — TELEPHONE ENCOUNTER
----- Message from Nohelia Rice sent at 11/29/2022 10:19 AM CST -----  Contact: self  Type:  Needs Medical Advice    Who Called: Patient  Would the patient rather a call back or a response via [facility]? Call  Best Call Back Number: [phone]  Additional Information: Would like to schedule an appt.

## 2022-12-02 ENCOUNTER — PATIENT OUTREACH (OUTPATIENT)
Dept: ADMINISTRATIVE | Facility: HOSPITAL | Age: 62
End: 2022-12-02
Payer: COMMERCIAL

## 2022-12-02 NOTE — PROGRESS NOTES
Non-compliant report chart audits. Chart review completed for HM test overdue (Mammogram, Colon Cancer Screening, Pap smear, DM labs, BP Check and/or Eye Exam)      Care Everywhere and media, updates requested and reviewed.        Pt is scheduled for eye exm on 1/30.

## 2022-12-05 ENCOUNTER — TELEPHONE (OUTPATIENT)
Dept: ENDOCRINOLOGY | Facility: CLINIC | Age: 62
End: 2022-12-05
Payer: COMMERCIAL

## 2022-12-05 DIAGNOSIS — E11.3513 TYPE 2 DIABETES MELLITUS WITH BOTH EYES AFFECTED BY PROLIFERATIVE RETINOPATHY AND MACULAR EDEMA, WITH LONG-TERM CURRENT USE OF INSULIN: Primary | ICD-10-CM

## 2022-12-05 DIAGNOSIS — Z79.4 TYPE 2 DIABETES MELLITUS WITH BOTH EYES AFFECTED BY PROLIFERATIVE RETINOPATHY AND MACULAR EDEMA, WITH LONG-TERM CURRENT USE OF INSULIN: ICD-10-CM

## 2022-12-05 DIAGNOSIS — Z79.4 TYPE 2 DIABETES MELLITUS WITH BOTH EYES AFFECTED BY PROLIFERATIVE RETINOPATHY AND MACULAR EDEMA, WITH LONG-TERM CURRENT USE OF INSULIN: Primary | ICD-10-CM

## 2022-12-05 DIAGNOSIS — E11.3513 TYPE 2 DIABETES MELLITUS WITH BOTH EYES AFFECTED BY PROLIFERATIVE RETINOPATHY AND MACULAR EDEMA, WITH LONG-TERM CURRENT USE OF INSULIN: ICD-10-CM

## 2022-12-05 RX ORDER — SEMAGLUTIDE 1.34 MG/ML
0.5 INJECTION, SOLUTION SUBCUTANEOUS
Qty: 1 PEN | Refills: 2 | Status: SHIPPED | OUTPATIENT
Start: 2022-12-05 | End: 2022-12-14

## 2022-12-05 RX ORDER — DULAGLUTIDE 4.5 MG/.5ML
4.5 INJECTION, SOLUTION SUBCUTANEOUS
Qty: 4 PEN | Refills: 11 | Status: SHIPPED | OUTPATIENT
Start: 2022-12-05 | End: 2022-12-05

## 2022-12-05 NOTE — TELEPHONE ENCOUNTER
Pt states trulicity has been out of stock for weeks. She wants to know if you can send something else to her pharmacy. Not sure if they have ozempic

## 2022-12-05 NOTE — TELEPHONE ENCOUNTER
----- Message from Pati Wallace sent at 12/5/2022  9:16 AM CST -----  Type: Needs Medical Advice  Who Called: Pt   Symptoms (please be specific):   How long has patient had these symptoms:    Pharmacy name and phone #:  [facility] - [place] - [address]   Phone:  [phone]  Fax:  [phone]  Best Call Back Number: [phone]  Additional Information: Pt requesting a call back concerning being off meds for 3 weeks due to the pharmacy does not have the medication.Rx dulaglutide (TRULICITY) 4.5 mg/0.5 mL pen injector 4 pen Pt requesting advise.

## 2022-12-05 NOTE — TELEPHONE ENCOUNTER
Please disregard my previous message. [facility] doesn't have the Trulicity either.     I will try sending in the Ozempic 0.5 mg pen to her [facility]. Ask her to contact me if they don't have this in stock. Her dose will be 0.5 mg once weekly. There is a higher dose we can increase to with future refill but I'd like her to start with the 0.5 mg dose.

## 2022-12-14 ENCOUNTER — TELEPHONE (OUTPATIENT)
Dept: ENDOCRINOLOGY | Facility: CLINIC | Age: 62
End: 2022-12-14
Payer: COMMERCIAL

## 2022-12-14 DIAGNOSIS — E11.3513 TYPE 2 DIABETES MELLITUS WITH BOTH EYES AFFECTED BY PROLIFERATIVE RETINOPATHY AND MACULAR EDEMA, WITH LONG-TERM CURRENT USE OF INSULIN: Primary | ICD-10-CM

## 2022-12-14 DIAGNOSIS — Z79.4 TYPE 2 DIABETES MELLITUS WITH BOTH EYES AFFECTED BY PROLIFERATIVE RETINOPATHY AND MACULAR EDEMA, WITH LONG-TERM CURRENT USE OF INSULIN: Primary | ICD-10-CM

## 2022-12-14 NOTE — TELEPHONE ENCOUNTER
Please call pt. With any change in therapy, it can take >1 week to see results. However, I started her at the lower Ozempic dose to make sure she didn't have any nausea, constipation, diarrhea. If she is not, then she can increase the dose to 1 mg once weekly. I will send in the 1 mg pen to her pharmacy. With the doses she has remaining in her CURRENT pen, she can take two 0.5 mg injections (to equal 1 mg) until she uses up that pen.

## 2022-12-14 NOTE — TELEPHONE ENCOUNTER
----- Message from Robyn Erazo sent at 12/14/2022  1:02 PM CST -----  Contact: [phone]  Type: Needs Medical Advice  Who Called:  Pt     Best Call Back Number: [phone]    Additional Information: Pt requesting call back from office. Pt did not provide any other information.

## 2022-12-14 NOTE — TELEPHONE ENCOUNTER
Pt states she does not think the Ozempic is helping her blood sugars. Says they are consistently in the 200s. (Has only taken once- advised she will need to continue dose for several weeks to start seeing effects of medication). Pt wanted your feedback. Please advise.

## 2022-12-19 ENCOUNTER — TELEPHONE (OUTPATIENT)
Dept: ENDOCRINOLOGY | Facility: CLINIC | Age: 62
End: 2022-12-19
Payer: COMMERCIAL

## 2022-12-19 NOTE — TELEPHONE ENCOUNTER
----- Message from Malia Randall sent at 12/19/2022  9:17 AM CST -----  Contact: Self  Type:  Patient Returning Call    Who Called:  Patient  Who Left Message for Patient:  N/A-needs to speak to the nurse  Does the patient know what this is regarding?:  new medication she started   Best Call Back Number:  [phone]  Additional Information:  Thank You

## 2022-12-19 NOTE — TELEPHONE ENCOUNTER
Pt states that it seemed her ozempic 0.5mg was not working. She stated that the numbers never changed when she tried to put it on 0.5. she has spoken with the pharmacist and they said to have her bring it to them to see if they can figured out what is going on.    Notified her Olya [facility]    Pt verb understanding

## 2022-12-20 ENCOUNTER — TELEPHONE (OUTPATIENT)
Dept: ENDOCRINOLOGY | Facility: CLINIC | Age: 62
End: 2022-12-20
Payer: COMMERCIAL

## 2022-12-20 NOTE — TELEPHONE ENCOUNTER
S/w pt states she got an e-mail form the manufacture of ozempic to show to the pharmacy so they can dispense it for  her. I advised her to go to the [facility] pharmacy and show the e-mail to  them. They have the ozempic 1 mg in stock  Pt verb understanding

## 2022-12-20 NOTE — TELEPHONE ENCOUNTER
----- Message from Tamia Butler sent at 12/20/2022  8:58 AM CST -----  Contact: patient  Type:  Patient Call          Who Called: patient         Does the patient know what this is regarding?: Requesting a call back about Rx semaglutide (OZEMPIC) 1 mg/dose (4 mg/3 mL ; pt said she contacted the manufacture and need to explain what she was told ; please advise            Would the patient rather a call back or a response via [facility]?call           Best Call Back Number: [phone]             Additional Information:

## 2023-01-06 RX ORDER — METFORMIN HYDROCHLORIDE 1000 MG/1
TABLET ORAL
Qty: 180 TABLET | Refills: 0 | Status: SHIPPED | OUTPATIENT
Start: 2023-01-06 | End: 2023-04-06

## 2023-01-07 NOTE — TELEPHONE ENCOUNTER
Care Due:                  Date            Visit Type   Department     Provider  --------------------------------------------------------------------------------                                EP -                              PRIMARY      [facility] Katelyn Barrett  Last Visit: 07-      CARE ([facility])   MEDICINE       Anger                              EP -                              PRIMARY      [facility]    Katelyn Barrett  Next Visit: 01-      CARE ([facility])   MEDICINE       Anger                                                            Last  Test          Frequency    Reason                     Performed    Due Date  --------------------------------------------------------------------------------    HBA1C.......  6 months...  metFORMIN................  10-   04-    Lipid Panel.  12 months..  rosuvastatin.............  03- 03-    [employer] Embedded Care Gaps. Reference number: [id number]. 1/06/2023   7:33:59 PM CST

## 2023-01-07 NOTE — TELEPHONE ENCOUNTER
Refill Decision Note   Nohelia Rodriguez  is requesting a refill authorization.  Brief Assessment and Rationale for Refill:  Approve    -Medication-Related Problems Identified: Requires labs  Medication Therapy Plan:       Medication Reconciliation Completed: No   Comments:     Provider Staff:     Action is required for this patient.   Please see care gap opportunities below in Care Due Message.     Thanks!  [facility]     Appointments      Date Provider   Last Visit   7/11/2022 Katelyn Barrett MD   Next Visit   1/23/2023 Katelyn Barrett MD     Note composed:8:10 PM 01/06/2023           Note composed:8:10 PM 01/06/2023

## 2023-01-10 ENCOUNTER — PATIENT OUTREACH (OUTPATIENT)
Dept: ADMINISTRATIVE | Facility: HOSPITAL | Age: 63
End: 2023-01-10
Payer: COMMERCIAL

## 2023-01-10 NOTE — PROGRESS NOTES
01/10/2023 Care Everywhere updates requested and reviewed.  Immunizations reconciled. Media reports reviewed.  Duplicate HM overrides and  orders removed.  Overdue HM topic chart audit and/or requested.  Overdue lab testing linked to upcoming lab appointments if applies.  EYE EXAM SCHEDULED    The following record(s)  below were uploaded for Health Maintenance .    IMMUNIZATIONS  Future Appointments   Date Time Provider Department Center   2023  9:10 AM Katelyn Barrett MD [facility] [place]   2023  9:10 AM RUFINO Herrera MD [facility] OPHTHAL [place]   2023 10:10 AM LAB, [place] [facility] LAB [place]   2023  2:00 PM GIL Avila,FNP-C [facility] ENDOCRN [place]

## 2023-01-18 ENCOUNTER — TELEPHONE (OUTPATIENT)
Dept: FAMILY MEDICINE | Facility: CLINIC | Age: 63
End: 2023-01-18
Payer: COMMERCIAL

## 2023-01-18 DIAGNOSIS — E11.8 DIABETES MELLITUS TYPE 2 WITH COMPLICATIONS: Primary | ICD-10-CM

## 2023-01-18 NOTE — TELEPHONE ENCOUNTER
Pt has appt scheduled for 1/23 and was wondering if she need any labs done prior to appt.  Please advise

## 2023-01-18 NOTE — TELEPHONE ENCOUNTER
----- Message from Robyn Erazo sent at 1/18/2023  1:37 PM CST -----  Contact: [phone]  Type: Needs Medical Advice  Who Called:  Pt     Best Call Back Number: [phone] (home)     Additional Information: Pt is calling to ask if she needs labs done before appt on Jan 23rd. Pls call back and advise

## 2023-01-20 NOTE — TELEPHONE ENCOUNTER
Lvm with call back number advising that lab orders are in if they open any appts for lab tomorrow.

## 2023-01-23 ENCOUNTER — OFFICE VISIT (OUTPATIENT)
Dept: FAMILY MEDICINE | Facility: CLINIC | Age: 63
End: 2023-01-23
Payer: COMMERCIAL

## 2023-01-23 VITALS
HEIGHT: 69 IN | OXYGEN SATURATION: 97 % | BODY MASS INDEX: 28.33 KG/M2 | DIASTOLIC BLOOD PRESSURE: 80 MMHG | TEMPERATURE: 98 F | SYSTOLIC BLOOD PRESSURE: 132 MMHG | HEART RATE: 94 BPM | WEIGHT: 191.25 LBS | RESPIRATION RATE: 16 BRPM

## 2023-01-23 DIAGNOSIS — E78.5 HYPERLIPIDEMIA ASSOCIATED WITH TYPE 2 DIABETES MELLITUS: ICD-10-CM

## 2023-01-23 DIAGNOSIS — E11.59 HYPERTENSION ASSOCIATED WITH DIABETES: ICD-10-CM

## 2023-01-23 DIAGNOSIS — E11.69 HYPERLIPIDEMIA ASSOCIATED WITH TYPE 2 DIABETES MELLITUS: ICD-10-CM

## 2023-01-23 DIAGNOSIS — I15.2 HYPERTENSION ASSOCIATED WITH DIABETES: ICD-10-CM

## 2023-01-23 DIAGNOSIS — E11.8 DIABETES MELLITUS TYPE 2 WITH COMPLICATIONS: ICD-10-CM

## 2023-01-23 DIAGNOSIS — D64.9 ANEMIA, UNSPECIFIED TYPE: ICD-10-CM

## 2023-01-23 DIAGNOSIS — Z00.00 ANNUAL PHYSICAL EXAM: Primary | ICD-10-CM

## 2023-01-23 DIAGNOSIS — D72.819 LEUKOPENIA, UNSPECIFIED TYPE: ICD-10-CM

## 2023-01-23 PROCEDURE — 99396 PR PREVENTIVE VISIT,EST,40-64: ICD-10-PCS | Mod: S$GLB,,, | Performed by: FAMILY MEDICINE

## 2023-01-23 PROCEDURE — 99396 PREV VISIT EST AGE 40-64: CPT | Mod: S$GLB,,, | Performed by: FAMILY MEDICINE

## 2023-01-23 PROCEDURE — 1159F MED LIST DOCD IN RCRD: CPT | Mod: CPTII,S$GLB,, | Performed by: FAMILY MEDICINE

## 2023-01-23 PROCEDURE — 1160F PR REVIEW ALL MEDS BY PRESCRIBER/CLIN PHARMACIST DOCUMENTED: ICD-10-PCS | Mod: CPTII,S$GLB,, | Performed by: FAMILY MEDICINE

## 2023-01-23 PROCEDURE — 3008F PR BODY MASS INDEX (BMI) DOCUMENTED: ICD-10-PCS | Mod: CPTII,S$GLB,, | Performed by: FAMILY MEDICINE

## 2023-01-23 PROCEDURE — 4010F ACE/ARB THERAPY RXD/TAKEN: CPT | Mod: CPTII,S$GLB,, | Performed by: FAMILY MEDICINE

## 2023-01-23 PROCEDURE — 1159F PR MEDICATION LIST DOCUMENTED IN MEDICAL RECORD: ICD-10-PCS | Mod: CPTII,S$GLB,, | Performed by: FAMILY MEDICINE

## 2023-01-23 PROCEDURE — 3079F PR MOST RECENT DIASTOLIC BLOOD PRESSURE 80-89 MM HG: ICD-10-PCS | Mod: CPTII,S$GLB,, | Performed by: FAMILY MEDICINE

## 2023-01-23 PROCEDURE — 3075F PR MOST RECENT SYSTOLIC BLOOD PRESS GE 130-139MM HG: ICD-10-PCS | Mod: CPTII,S$GLB,, | Performed by: FAMILY MEDICINE

## 2023-01-23 PROCEDURE — 4010F PR ACE/ARB THEARPY RXD/TAKEN: ICD-10-PCS | Mod: CPTII,S$GLB,, | Performed by: FAMILY MEDICINE

## 2023-01-23 PROCEDURE — 3079F DIAST BP 80-89 MM HG: CPT | Mod: CPTII,S$GLB,, | Performed by: FAMILY MEDICINE

## 2023-01-23 PROCEDURE — 1160F RVW MEDS BY RX/DR IN RCRD: CPT | Mod: CPTII,S$GLB,, | Performed by: FAMILY MEDICINE

## 2023-01-23 PROCEDURE — 3008F BODY MASS INDEX DOCD: CPT | Mod: CPTII,S$GLB,, | Performed by: FAMILY MEDICINE

## 2023-01-23 PROCEDURE — 3075F SYST BP GE 130 - 139MM HG: CPT | Mod: CPTII,S$GLB,, | Performed by: FAMILY MEDICINE

## 2023-01-23 RX ORDER — LISINOPRIL 5 MG/1
5 TABLET ORAL DAILY
Qty: 90 TABLET | Refills: 0 | Status: SHIPPED | OUTPATIENT
Start: 2023-01-23 | End: 2023-03-24

## 2023-01-23 NOTE — PROGRESS NOTES
Subjective:       Patient ID: Nohelia Rodriguez is a 62 y.o. female.    Chief Complaint: Follow-up    HPI  The patient is a 62-year-old who is here today for her annual visit and for follow-up.  Overall, she has been doing well.  She has been working at [employer] now for 26 years.  At [facility], they have changed work roles and now she is over 3 different departments.  She does get a lot of walking in at work and walks around the building at lunch.  She also goes to a gym and works with a .  She is watching her diet.  Since her last visit in July, her weight is down from 198 lb to 191 lb.  She tells me that she has had a recent pneumonia and COVID vaccine despite these not showing up in the chart.  Her mammogram and colon cancer screening are up-to-date    Today we discussed the followin)  Diabetes.  She is currently taking metformin and Ozempic.  Her diabetes is doing better with the addition of the Ozempic.  Previously, her diabetes got off track when she could not get the Trulicity she was taking before starting the Ozempic.  She was started on Ozempic in November and had the dose adjusted in December.  She has the Ozempic well.  Her sugars are now in the 130s to 140s but had been in the 200s when she was out of the Trulicity and before she started the Ozempic.  She did have a diabetic eye exam at [facility]  2)  Hypertension.  Today blood pressure is 132/80.  She is taking Norvasc and Coreg consistently.  We do have Avalide on her medicine card but she is not been taking this.  At home, her blood pressures are usually consistently in the 110s  3) history of leukopenia and anemia.  She only had the 1 visit with Hematology Department.  They had recommended a bone marrow biopsy but she had a lot going on with her family and could not proceed with that.  She would be willing to follow-up with them now if needed.      Review of Systems   Constitutional:  Negative for appetite change, chills, diaphoresis,  fatigue, fever and unexpected weight change.   HENT:  Negative for congestion, dental problem, ear pain, hearing loss, postnasal drip, rhinorrhea, sneezing, sore throat and trouble swallowing.    Eyes:  Negative for photophobia, pain, discharge and visual disturbance.   Respiratory:  Negative for cough, chest tightness, shortness of breath and wheezing.    Cardiovascular:  Negative for chest pain, palpitations and leg swelling.   Gastrointestinal:  Negative for abdominal distention, abdominal pain, blood in stool, constipation, diarrhea, nausea and vomiting.   Endocrine: Negative for cold intolerance, heat intolerance, polydipsia and polyuria.   Genitourinary:  Negative for dysuria, flank pain, frequency, genital sores, hematuria, menstrual problem and vaginal discharge.   Musculoskeletal:  Negative for arthralgias, joint swelling and myalgias.   Skin:  Negative for rash.   Neurological:  Negative for dizziness, syncope, light-headedness and headaches.   Hematological:  Negative for adenopathy. Does not bruise/bleed easily.   Psychiatric/Behavioral:  Negative for dysphoric mood, self-injury, sleep disturbance and suicidal ideas. The patient is not nervous/anxious.        Objective:      Physical Exam  Constitutional:       General: She is not in acute distress.     Appearance: Normal appearance. She is well-developed.   HENT:      Head: Normocephalic and atraumatic.      Right Ear: Hearing, tympanic membrane, ear canal and external ear normal.      Left Ear: Hearing, tympanic membrane, ear canal and external ear normal.      Nose: Nose normal.      Mouth/Throat:      Mouth: No oral lesions.      Pharynx: No oropharyngeal exudate or posterior oropharyngeal erythema.   Eyes:      General: Lids are normal. No scleral icterus.     Extraocular Movements: Extraocular movements intact.      Conjunctiva/sclera: Conjunctivae normal.      Pupils: Pupils are equal, round, and reactive to light.   Neck:      Thyroid: No thyroid  "mass or thyromegaly.      Vascular: No carotid bruit.   Cardiovascular:      Rate and Rhythm: Normal rate and regular rhythm. No extrasystoles are present.     Chest Wall: PMI is not displaced.      Heart sounds: Normal heart sounds. No murmur heard.    No gallop.   Pulmonary:      Effort: Pulmonary effort is normal. No accessory muscle usage or respiratory distress.      Breath sounds: Normal breath sounds.   Abdominal:      General: Bowel sounds are normal. There is no abdominal bruit.      Palpations: Abdomen is soft.      Tenderness: There is no abdominal tenderness. There is no rebound.   Musculoskeletal:      Cervical back: Normal range of motion and neck supple.   Lymphadenopathy:      Head:      Right side of head: No submental or submandibular adenopathy.      Left side of head: No submental or submandibular adenopathy.      Cervical:      Right cervical: No superficial, deep or posterior cervical adenopathy.     Left cervical: No superficial, deep or posterior cervical adenopathy.      Upper Body:      Right upper body: No supraclavicular adenopathy.      Left upper body: No supraclavicular adenopathy.   Skin:     General: Skin is warm and dry.   Neurological:      Mental Status: She is alert and oriented to person, place, and time.      Cranial Nerves: No cranial nerve deficit.      Sensory: No sensory deficit.   Psychiatric:         Speech: Speech normal.         Behavior: Behavior normal.         Thought Content: Thought content normal.     Blood pressure 132/80, pulse 94, temperature 98.2 °F (36.8 °C), temperature source Temporal, resp. rate 16, height 5' 9" (1.753 m), weight 86.7 kg (191 lb 4 oz), SpO2 97 %.Body mass index is 28.24 kg/m².            A/P:  1) annual exam.  Health maintenance issues and anticipatory guidance issues were discussed.  She will continue to stay physically active and consume a healthy diet.  She will keep up-to-date with her annual mammograms.  She will verify her " immunization status at work and we will revisit this at her next visit  2)  Diabetes.  Well controlled based on home readings.  Continue with Ozempic and metformin.  We will check an A1c and urine microalbumin soon.  We will request her diabetic eye exam.  She will keep her upcoming appointment with endocrinology team.  We are adding an ACE-inhibitor    2)  Hypertension.  Well controlled.  Continue with Norvasc and Coreg.  I am going to add lisinopril 5 mg once a day.  We will check a BMP in 3-4 weeks  3) history of leukopenia and anemia.  Status unknown.  We will check a CBC.  I did reach out to the Heme-Onc team regarding follow-up and asking them to add any additional labs they would like to her upcoming lab appointment  4)  Hyperlipidemia.  Previously well controlled.  Continue with Crestor.  We will check a fasting lipid profile with upcoming labs    As long as she does well, I will see her back in 3 months or sooner if needed

## 2023-01-25 ENCOUNTER — PATIENT OUTREACH (OUTPATIENT)
Dept: ADMINISTRATIVE | Facility: HOSPITAL | Age: 63
End: 2023-01-25
Payer: COMMERCIAL

## 2023-01-25 NOTE — LETTER
AUTHORIZATION FOR RELEASE OF   CONFIDENTIAL INFORMATION    Mckay Xavier OD    We are seeing Nohelia Rodriguez, date of birth 1960, in the clinic at [facility]. Katelyn Barrett MD is the patient's PCP. Nohelia Rodriguez has an outstanding lab/procedure at the time we reviewed her chart. In order to help keep her health information updated, she has authorized us to request the following medical record(s):        EYE EXAM                Please fax records to [facility], Richelle Schiro, MD, [phone]     If you have any questions, please contact Tiffany Granger, Care Coordinator   at [phone].            Patient Name: Nohelia Rodriguez  : 1960  Patient Phone #: [phone]

## 2023-01-25 NOTE — PROGRESS NOTES
RECORDS REQUESTED            Routine Dilated Eye Exam  (Diabetic Retinopathy screening)     Non-compliant report chart audits for Eye Exam for Patients with Diabetes     Outreach to patient in reference to a routine Eye Exam

## 2023-01-27 ENCOUNTER — TELEPHONE (OUTPATIENT)
Dept: HEMATOLOGY/ONCOLOGY | Facility: CLINIC | Age: 63
End: 2023-01-27
Payer: COMMERCIAL

## 2023-01-27 NOTE — TELEPHONE ENCOUNTER
----- Message from Tamiko Estrada MA sent at 1/27/2023  3:52 PM CST -----  Regarding: FW: follow-up  For you  tamiko  ----- Message -----  From: Jeri Bernal NP  Sent: 1/27/2023   3:26 PM CST  To: [facility] Staff  Subject: follow-up                                        Her PCP reached, out to me. Can we see about getting her in for follow-up with Dr. Whitfield at some point? She did not keep appointments for [facility] or follow-up with Shiraz after our consultation. I would like for her to see MD when she comes back to discuss further.   Thanks- Jeri

## 2023-01-30 ENCOUNTER — OFFICE VISIT (OUTPATIENT)
Dept: OPHTHALMOLOGY | Facility: CLINIC | Age: 63
End: 2023-01-30
Payer: COMMERCIAL

## 2023-01-30 DIAGNOSIS — E11.3513 CONTROLLED TYPE 2 DIABETES MELLITUS WITH BOTH EYES AFFECTED BY PROLIFERATIVE RETINOPATHY AND MACULAR EDEMA, WITH LONG-TERM CURRENT USE OF INSULIN: Primary | ICD-10-CM

## 2023-01-30 DIAGNOSIS — H35.012 RETINAL MACROANEURYSM, LEFT EYE: ICD-10-CM

## 2023-01-30 DIAGNOSIS — Z79.4 CONTROLLED TYPE 2 DIABETES MELLITUS WITH BOTH EYES AFFECTED BY PROLIFERATIVE RETINOPATHY AND MACULAR EDEMA, WITH LONG-TERM CURRENT USE OF INSULIN: Primary | ICD-10-CM

## 2023-01-30 PROCEDURE — 92134 CPTRZ OPH DX IMG PST SGM RTA: CPT | Mod: S$GLB,,, | Performed by: OPHTHALMOLOGY

## 2023-01-30 PROCEDURE — 92201 OPSCPY EXTND RTA DRAW UNI/BI: CPT | Mod: S$GLB,,, | Performed by: OPHTHALMOLOGY

## 2023-01-30 PROCEDURE — 99999 PR PBB SHADOW E&M-EST. PATIENT-LVL III: CPT | Mod: PBBFAC,,, | Performed by: OPHTHALMOLOGY

## 2023-01-30 PROCEDURE — 1160F RVW MEDS BY RX/DR IN RCRD: CPT | Mod: CPTII,S$GLB,, | Performed by: OPHTHALMOLOGY

## 2023-01-30 PROCEDURE — 92134 POSTERIOR SEGMENT OCT RETINA (OCULAR COHERENCE TOMOGRAPHY)-BOTH EYES: ICD-10-PCS | Mod: S$GLB,,, | Performed by: OPHTHALMOLOGY

## 2023-01-30 PROCEDURE — 92235 FLUORESCEIN ANGRPH MLTIFRAME: CPT | Mod: S$GLB,,, | Performed by: OPHTHALMOLOGY

## 2023-01-30 PROCEDURE — 4010F ACE/ARB THERAPY RXD/TAKEN: CPT | Mod: CPTII,S$GLB,, | Performed by: OPHTHALMOLOGY

## 2023-01-30 PROCEDURE — 4010F PR ACE/ARB THEARPY RXD/TAKEN: ICD-10-PCS | Mod: CPTII,S$GLB,, | Performed by: OPHTHALMOLOGY

## 2023-01-30 PROCEDURE — 92014 COMPRE OPH EXAM EST PT 1/>: CPT | Mod: S$GLB,,, | Performed by: OPHTHALMOLOGY

## 2023-01-30 PROCEDURE — 99999 PR PBB SHADOW E&M-EST. PATIENT-LVL III: ICD-10-PCS | Mod: PBBFAC,,, | Performed by: OPHTHALMOLOGY

## 2023-01-30 PROCEDURE — 92201 PR OPHTHALMOSCOPY, EXT, W/RET DRAW/SCLERAL DEPR, I&R, UNI/BI: ICD-10-PCS | Mod: S$GLB,,, | Performed by: OPHTHALMOLOGY

## 2023-01-30 PROCEDURE — 92235 FLUORESCEIN ANGIOGRAPHY - OU - BOTH EYES: ICD-10-PCS | Mod: S$GLB,,, | Performed by: OPHTHALMOLOGY

## 2023-01-30 PROCEDURE — 1160F PR REVIEW ALL MEDS BY PRESCRIBER/CLIN PHARMACIST DOCUMENTED: ICD-10-PCS | Mod: CPTII,S$GLB,, | Performed by: OPHTHALMOLOGY

## 2023-01-30 PROCEDURE — 1159F PR MEDICATION LIST DOCUMENTED IN MEDICAL RECORD: ICD-10-PCS | Mod: CPTII,S$GLB,, | Performed by: OPHTHALMOLOGY

## 2023-01-30 PROCEDURE — 92014 PR EYE EXAM, EST PATIENT,COMPREHESV: ICD-10-PCS | Mod: S$GLB,,, | Performed by: OPHTHALMOLOGY

## 2023-01-30 PROCEDURE — 1159F MED LIST DOCD IN RCRD: CPT | Mod: CPTII,S$GLB,, | Performed by: OPHTHALMOLOGY

## 2023-01-30 RX ORDER — FLUORESCEIN 500 MG/ML
5 INJECTION INTRAVENOUS ONCE
Status: COMPLETED | OUTPATIENT
Start: 2023-01-30 | End: 2023-01-30

## 2023-01-30 RX ADMIN — FLUORESCEIN 500 MG: 500 INJECTION INTRAVENOUS at 10:01

## 2023-01-30 NOTE — PROGRESS NOTES
HPI    OVERDUE 12 mo OCT FA OS   DLS- 12/20/21 Dr. Zambrano    Pt sts she has been seeing Dr. Xavier and seen him last October last year   and was told her eyes were stable. She wears glasses and had gotten them   filled at Dr. Xavier's last exam visit and they are working well for her.   Denies pain   (-)Flashes (-)Floaters  (-)Photophobia  (-)Glare    EYEMED:  Refresh PRN  Latanoprost QHS OU           OCT - OD trace ME OD  OS - temporal ME a/w EMILE - s/p focal, resolved    FA - late macular leakage OS  NO NV OU      A/P    1. ?PDR OD  S/p light PRP OD with PSO'S  Mod NPDR OS    2. DME   OD   1/23 - trace ME with good Va    Monitor for now    OS -   A/w EMILE  S/p multiple injections with PSO'S  S/p Avastin OS x 3 with me  S/p Focal OS 5/18    Stable observe      3. HTN Ret OU  BS/BP/chol control    4. NS OU  No VS        6 months OCT

## 2023-02-13 ENCOUNTER — LAB VISIT (OUTPATIENT)
Dept: LAB | Facility: HOSPITAL | Age: 63
End: 2023-02-13
Attending: NURSE PRACTITIONER
Payer: COMMERCIAL

## 2023-02-13 DIAGNOSIS — E11.8 DIABETES MELLITUS TYPE 2 WITH COMPLICATIONS: ICD-10-CM

## 2023-02-13 DIAGNOSIS — E11.3513 TYPE 2 DIABETES MELLITUS WITH BOTH EYES AFFECTED BY PROLIFERATIVE RETINOPATHY AND MACULAR EDEMA, WITH LONG-TERM CURRENT USE OF INSULIN: ICD-10-CM

## 2023-02-13 DIAGNOSIS — Z79.4 TYPE 2 DIABETES MELLITUS WITH BOTH EYES AFFECTED BY PROLIFERATIVE RETINOPATHY AND MACULAR EDEMA, WITH LONG-TERM CURRENT USE OF INSULIN: ICD-10-CM

## 2023-02-13 LAB
ALBUMIN SERPL BCP-MCNC: 3.9 G/DL (ref 3.5–5.2)
ALP SERPL-CCNC: 33 U/L (ref 55–135)
ALT SERPL W/O P-5'-P-CCNC: 6 U/L (ref 10–44)
ANION GAP SERPL CALC-SCNC: 11 MMOL/L (ref 8–16)
AST SERPL-CCNC: 14 U/L (ref 10–40)
BASOPHILS # BLD AUTO: 0.04 K/UL (ref 0–0.2)
BASOPHILS NFR BLD: 1.6 % (ref 0–1.9)
BILIRUB SERPL-MCNC: 0.4 MG/DL (ref 0.1–1)
BUN SERPL-MCNC: 16 MG/DL (ref 8–23)
CALCIUM SERPL-MCNC: 10.4 MG/DL (ref 8.7–10.5)
CHLORIDE SERPL-SCNC: 106 MMOL/L (ref 95–110)
CHOLEST SERPL-MCNC: 111 MG/DL (ref 120–199)
CHOLEST/HDLC SERPL: 2.4 {RATIO} (ref 2–5)
CO2 SERPL-SCNC: 23 MMOL/L (ref 23–29)
CREAT SERPL-MCNC: 0.9 MG/DL (ref 0.5–1.4)
DIFFERENTIAL METHOD: ABNORMAL
EOSINOPHIL # BLD AUTO: 0.1 K/UL (ref 0–0.5)
EOSINOPHIL NFR BLD: 3.6 % (ref 0–8)
ERYTHROCYTE [DISTWIDTH] IN BLOOD BY AUTOMATED COUNT: 13.6 % (ref 11.5–14.5)
EST. GFR  (NO RACE VARIABLE): >60 ML/MIN/1.73 M^2
ESTIMATED AVG GLUCOSE: 186 MG/DL (ref 68–131)
GLUCOSE SERPL-MCNC: 115 MG/DL (ref 70–110)
HBA1C MFR BLD: 8.1 % (ref 4–5.6)
HCT VFR BLD AUTO: 31.8 % (ref 37–48.5)
HDLC SERPL-MCNC: 46 MG/DL (ref 40–75)
HDLC SERPL: 41.4 % (ref 20–50)
HGB BLD-MCNC: 10 G/DL (ref 12–16)
IMM GRANULOCYTES # BLD AUTO: 0 K/UL (ref 0–0.04)
IMM GRANULOCYTES NFR BLD AUTO: 0 % (ref 0–0.5)
LDLC SERPL CALC-MCNC: 52.2 MG/DL (ref 63–159)
LYMPHOCYTES # BLD AUTO: 1.3 K/UL (ref 1–4.8)
LYMPHOCYTES NFR BLD: 50.2 % (ref 18–48)
MCH RBC QN AUTO: 27.6 PG (ref 27–31)
MCHC RBC AUTO-ENTMCNC: 31.4 G/DL (ref 32–36)
MCV RBC AUTO: 88 FL (ref 82–98)
MONOCYTES # BLD AUTO: 0.2 K/UL (ref 0.3–1)
MONOCYTES NFR BLD: 7.9 % (ref 4–15)
NEUTROPHILS # BLD AUTO: 0.9 K/UL (ref 1.8–7.7)
NEUTROPHILS NFR BLD: 36.7 % (ref 38–73)
NONHDLC SERPL-MCNC: 65 MG/DL
NRBC BLD-RTO: 0 /100 WBC
PLATELET # BLD AUTO: 224 K/UL (ref 150–450)
PLATELET BLD QL SMEAR: ABNORMAL
PMV BLD AUTO: 10.4 FL (ref 9.2–12.9)
POTASSIUM SERPL-SCNC: 4 MMOL/L (ref 3.5–5.1)
PROT SERPL-MCNC: 7 G/DL (ref 6–8.4)
RBC # BLD AUTO: 3.62 M/UL (ref 4–5.4)
SODIUM SERPL-SCNC: 140 MMOL/L (ref 136–145)
TRIGL SERPL-MCNC: 64 MG/DL (ref 30–150)
TSH SERPL DL<=0.005 MIU/L-ACNC: 0.57 UIU/ML (ref 0.4–4)
WBC # BLD AUTO: 2.53 K/UL (ref 3.9–12.7)

## 2023-02-13 PROCEDURE — 85025 COMPLETE CBC W/AUTO DIFF WBC: CPT | Performed by: FAMILY MEDICINE

## 2023-02-13 PROCEDURE — 80061 LIPID PANEL: CPT | Performed by: FAMILY MEDICINE

## 2023-02-13 PROCEDURE — 83036 HEMOGLOBIN GLYCOSYLATED A1C: CPT | Performed by: NURSE PRACTITIONER

## 2023-02-13 PROCEDURE — 80053 COMPREHEN METABOLIC PANEL: CPT | Performed by: FAMILY MEDICINE

## 2023-02-13 PROCEDURE — 36415 COLL VENOUS BLD VENIPUNCTURE: CPT | Mod: PO | Performed by: FAMILY MEDICINE

## 2023-02-13 PROCEDURE — 84443 ASSAY THYROID STIM HORMONE: CPT | Performed by: FAMILY MEDICINE

## 2023-02-14 ENCOUNTER — PATIENT MESSAGE (OUTPATIENT)
Dept: FAMILY MEDICINE | Facility: CLINIC | Age: 63
End: 2023-02-14
Payer: COMMERCIAL

## 2023-02-20 ENCOUNTER — OFFICE VISIT (OUTPATIENT)
Dept: ENDOCRINOLOGY | Facility: CLINIC | Age: 63
End: 2023-02-20
Payer: COMMERCIAL

## 2023-02-20 VITALS
WEIGHT: 189.13 LBS | DIASTOLIC BLOOD PRESSURE: 70 MMHG | HEIGHT: 69 IN | BODY MASS INDEX: 28.01 KG/M2 | HEART RATE: 86 BPM | SYSTOLIC BLOOD PRESSURE: 122 MMHG

## 2023-02-20 DIAGNOSIS — E11.3513 TYPE 2 DIABETES MELLITUS WITH BOTH EYES AFFECTED BY PROLIFERATIVE RETINOPATHY AND MACULAR EDEMA, WITH LONG-TERM CURRENT USE OF INSULIN: Primary | ICD-10-CM

## 2023-02-20 DIAGNOSIS — R80.9 TYPE 2 DIABETES MELLITUS WITH MICROALBUMINURIA, WITHOUT LONG-TERM CURRENT USE OF INSULIN: ICD-10-CM

## 2023-02-20 DIAGNOSIS — Z79.4 TYPE 2 DIABETES MELLITUS WITH BOTH EYES AFFECTED BY PROLIFERATIVE RETINOPATHY AND MACULAR EDEMA, WITH LONG-TERM CURRENT USE OF INSULIN: Primary | ICD-10-CM

## 2023-02-20 DIAGNOSIS — I10 PRIMARY HYPERTENSION: ICD-10-CM

## 2023-02-20 DIAGNOSIS — E11.29 TYPE 2 DIABETES MELLITUS WITH MICROALBUMINURIA, WITHOUT LONG-TERM CURRENT USE OF INSULIN: ICD-10-CM

## 2023-02-20 PROCEDURE — 3060F POS MICROALBUMINURIA REV: CPT | Mod: CPTII,S$GLB,, | Performed by: NURSE PRACTITIONER

## 2023-02-20 PROCEDURE — 3008F BODY MASS INDEX DOCD: CPT | Mod: CPTII,S$GLB,, | Performed by: NURSE PRACTITIONER

## 2023-02-20 PROCEDURE — 3060F PR POS MICROALBUMINURIA RESULT DOCUMENTED/REVIEW: ICD-10-PCS | Mod: CPTII,S$GLB,, | Performed by: NURSE PRACTITIONER

## 2023-02-20 PROCEDURE — 3008F PR BODY MASS INDEX (BMI) DOCUMENTED: ICD-10-PCS | Mod: CPTII,S$GLB,, | Performed by: NURSE PRACTITIONER

## 2023-02-20 PROCEDURE — 3074F SYST BP LT 130 MM HG: CPT | Mod: CPTII,S$GLB,, | Performed by: NURSE PRACTITIONER

## 2023-02-20 PROCEDURE — 4010F PR ACE/ARB THEARPY RXD/TAKEN: ICD-10-PCS | Mod: CPTII,S$GLB,, | Performed by: NURSE PRACTITIONER

## 2023-02-20 PROCEDURE — 3066F PR DOCUMENTATION OF TREATMENT FOR NEPHROPATHY: ICD-10-PCS | Mod: CPTII,S$GLB,, | Performed by: NURSE PRACTITIONER

## 2023-02-20 PROCEDURE — 4010F ACE/ARB THERAPY RXD/TAKEN: CPT | Mod: CPTII,S$GLB,, | Performed by: NURSE PRACTITIONER

## 2023-02-20 PROCEDURE — 1160F RVW MEDS BY RX/DR IN RCRD: CPT | Mod: CPTII,S$GLB,, | Performed by: NURSE PRACTITIONER

## 2023-02-20 PROCEDURE — 1160F PR REVIEW ALL MEDS BY PRESCRIBER/CLIN PHARMACIST DOCUMENTED: ICD-10-PCS | Mod: CPTII,S$GLB,, | Performed by: NURSE PRACTITIONER

## 2023-02-20 PROCEDURE — 3078F DIAST BP <80 MM HG: CPT | Mod: CPTII,S$GLB,, | Performed by: NURSE PRACTITIONER

## 2023-02-20 PROCEDURE — 3052F HG A1C>EQUAL 8.0%<EQUAL 9.0%: CPT | Mod: CPTII,S$GLB,, | Performed by: NURSE PRACTITIONER

## 2023-02-20 PROCEDURE — 3066F NEPHROPATHY DOC TX: CPT | Mod: CPTII,S$GLB,, | Performed by: NURSE PRACTITIONER

## 2023-02-20 PROCEDURE — 3078F PR MOST RECENT DIASTOLIC BLOOD PRESSURE < 80 MM HG: ICD-10-PCS | Mod: CPTII,S$GLB,, | Performed by: NURSE PRACTITIONER

## 2023-02-20 PROCEDURE — 1159F MED LIST DOCD IN RCRD: CPT | Mod: CPTII,S$GLB,, | Performed by: NURSE PRACTITIONER

## 2023-02-20 PROCEDURE — 99214 OFFICE O/P EST MOD 30 MIN: CPT | Mod: S$GLB,,, | Performed by: NURSE PRACTITIONER

## 2023-02-20 PROCEDURE — 99214 PR OFFICE/OUTPT VISIT, EST, LEVL IV, 30-39 MIN: ICD-10-PCS | Mod: S$GLB,,, | Performed by: NURSE PRACTITIONER

## 2023-02-20 PROCEDURE — 1159F PR MEDICATION LIST DOCUMENTED IN MEDICAL RECORD: ICD-10-PCS | Mod: CPTII,S$GLB,, | Performed by: NURSE PRACTITIONER

## 2023-02-20 PROCEDURE — 99999 PR PBB SHADOW E&M-EST. PATIENT-LVL III: CPT | Mod: PBBFAC,,, | Performed by: NURSE PRACTITIONER

## 2023-02-20 PROCEDURE — 3052F PR MOST RECENT HEMOGLOBIN A1C LEVEL 8.0 - < 9.0%: ICD-10-PCS | Mod: CPTII,S$GLB,, | Performed by: NURSE PRACTITIONER

## 2023-02-20 PROCEDURE — 99999 PR PBB SHADOW E&M-EST. PATIENT-LVL III: ICD-10-PCS | Mod: PBBFAC,,, | Performed by: NURSE PRACTITIONER

## 2023-02-20 PROCEDURE — 3074F PR MOST RECENT SYSTOLIC BLOOD PRESSURE < 130 MM HG: ICD-10-PCS | Mod: CPTII,S$GLB,, | Performed by: NURSE PRACTITIONER

## 2023-02-20 NOTE — PROGRESS NOTES
"CC: Ms. Nohelia Rodriguez arrives today for management of Type 2 DM and review of chronic medical conditions, as listed in the Visit Diagnosis section of this encounter.       HPI: Ms. Nohelia Rodriguez was diagnosed with Type 2 DM in her 40s. She did have GDM ~ 10 years prior to Type 2 DM diagnosis. She was diagnosed based on lab work. Initial treatment consisted of orals. + FH of DM in mother, 3 sisters, 2 brothers. Denies hospitalizations due to DM.     Patient was initially seen by me in October. Trulicity dose was increased. This was later changed to Ozempic because of supply issues. She was without any GLP-1RA for ~ 6 weeks. Has been taking Ozempic for 6 weeks now.     She feels that glucoses have improved since starting Ozempic and is losing weight.     BG readings are checked 1x/day.  Reports the following:   Fastin-120    Hypoglycemia: No    Missing Insulin/PO medication doses: No    Exercise: Yes - goes to gym 3 days/week.     Dietary Habits: Eats 3 meals/day.  Avoids sugary beverages.     Last DM education appointment: 3/14/2022      CURRENT DIABETIC MEDS: metformin 1000 mg BID, Ozempic 1 mg weekly  Vial or pen: pen  Glucometer type:     Previous DM treatments:  Glyburide  Lantus  Trulicity - availability     Last Eye Exam: 2023, + DR. Jessica Herrera  Last Podiatry Exam: 2021    REVIEW OF SYSTEMS  Constitutional: no c/o fatigue, weakness. + 14# weight loss since starting Ozempic.   Eyes: denies visual disturbances.  Cardiac: no palpitations or chest pain.  Respiratory: no cough or dyspnea.  GI: no c/o abdominal pain or nausea. Denies h/o pancreatitis. Reports some mild constipation. Taking Miralax.   Skin: no lesions or rashes.  Neuro: no numbness, tingling, or parasthesias.  Endocrine: denies polyphagia, polydipsia, polyuria      Personally reviewed Past Medical, Surgical, Social History.    Vital Signs  /70   Pulse 86   Ht 5' 9" (1.753 m)   Wt 85.8 kg (189 lb 2.5 oz)   BMI 27.93 kg/m² "     Personally reviewed the below labs:    Hemoglobin A1C   Date Value Ref Range Status   02/13/2023 8.1 (H) 4.0 - 5.6 % Final     Comment:     ADA Screening Guidelines:  5.7-6.4%  Consistent with prediabetes  >or=6.5%  Consistent with diabetes    High levels of fetal hemoglobin interfere with the HbA1C  assay. Heterozygous hemoglobin variants (HbS, HgC, etc)do  not significantly interfere with this assay.   However, presence of multiple variants may affect accuracy.     10/03/2022 7.5 (H) 4.0 - 5.6 % Final     Comment:     ADA Screening Guidelines:  5.7-6.4%  Consistent with prediabetes  >or=6.5%  Consistent with diabetes    High levels of fetal hemoglobin interfere with the HbA1C  assay. Heterozygous hemoglobin variants (HbS, HgC, etc)do  not significantly interfere with this assay.   However, presence of multiple variants may affect accuracy.     06/27/2022 7.5 (H) 4.0 - 5.6 % Final     Comment:     ADA Screening Guidelines:  5.7-6.4%  Consistent with prediabetes  >or=6.5%  Consistent with diabetes    High levels of fetal hemoglobin interfere with the HbA1C  assay. Heterozygous hemoglobin variants (HbS, HgC, etc)do  not significantly interfere with this assay.   However, presence of multiple variants may affect accuracy.         Chemistry        Component Value Date/Time     02/13/2023 1002    K 4.0 02/13/2023 1002     02/13/2023 1002    CO2 23 02/13/2023 1002    BUN 16 02/13/2023 1002    CREATININE 0.9 02/13/2023 1002     (H) 02/13/2023 1002        Component Value Date/Time    CALCIUM 10.4 02/13/2023 1002    ALKPHOS 33 (L) 02/13/2023 1002    AST 14 02/13/2023 1002    ALT 6 (L) 02/13/2023 1002    BILITOT 0.4 02/13/2023 1002    ESTGFRAFRICA >60.0 06/27/2022 0954    EGFRNONAA >60.0 06/27/2022 0954          Lab Results   Component Value Date    CHOL 111 (L) 02/13/2023    CHOL 127 03/07/2022    CHOL 136 04/01/2021     Lab Results   Component Value Date    HDL 46 02/13/2023    HDL 60 03/07/2022     HDL 49 04/01/2021     Lab Results   Component Value Date    LDLCALC 52.2 (L) 02/13/2023    LDLCALC 52.8 (L) 03/07/2022    LDLCALC 75.4 04/01/2021     Lab Results   Component Value Date    TRIG 64 02/13/2023    TRIG 71 03/07/2022    TRIG 58 04/01/2021     Lab Results   Component Value Date    CHOLHDL 41.4 02/13/2023    CHOLHDL 47.2 03/07/2022    CHOLHDL 36.0 04/01/2021       Lab Results   Component Value Date    MICALBCREAT 76.0 (H) 02/13/2023     Lab Results   Component Value Date    TSH 0.566 02/13/2023       CrCl cannot be calculated (Patient's most recent lab result is older than the maximum 7 days allowed.).    Vit D, 25-Hydroxy   Date Value Ref Range Status   05/25/2019 36 30 - 96 ng/mL Final     Comment:     Vitamin D deficiency.........<10 ng/mL                              Vitamin D insufficiency......10-29 ng/mL       Vitamin D sufficiency........> or equal to 30 ng/mL  Vitamin D toxicity............>100 ng/mL           PHYSICAL EXAMINATION  Constitutional: Appears well, no distress  Neck: Supple, trachea midline.  Respiratory: CTA, even and unlabored.  Cardiovascular: RRR, no murmurs, no carotid bruits.    GI: no hernia noted  Skin: warm and dry; no visible wounds  Neuro: oriented to person, place, time  Feet: appropriate footwear.       Goals        HEMOGLOBIN A1C < 7                 Assessment/Plan  1. Type 2 diabetes mellitus with both eyes affected by proliferative retinopathy and macular edema, with long-term current use of insulin  -- Uncontrolled. However, I expect A1c to decrease since last level was affected by being off of GLP-1RA for 6 weeks. Has lost weight, which should decrease insulin resistance.   -- continue metformin, Ozempic   -- BG monitoring 1x/day. Advised her to alternate times.     -- Discussed diagnosis of DM, A1c goals, progression of disease, long term complications and tx options.   2. Type 2 diabetes mellitus with microalbuminuria, without long-term current use of insulin --  now taking lisinopril   3. Primary hypertension  -- controlled  -- continue current meds       FOLLOW UP  Follow up in about 5 months (around 7/20/2023).   Patient instructed to bring BG logs to each follow up   Patient encouraged to call for any BG/medication issues, concerns, or questions.    Orders Placed This Encounter   Procedures    Hemoglobin A1C    Comprehensive Metabolic Panel

## 2023-02-27 ENCOUNTER — TELEPHONE (OUTPATIENT)
Dept: HEMATOLOGY/ONCOLOGY | Facility: CLINIC | Age: 63
End: 2023-02-27
Payer: COMMERCIAL

## 2023-02-27 NOTE — TELEPHONE ENCOUNTER
Please assist with scheduling pt for a hematology follow up.  Pt is off of work on mondays and asked that appt be scheduled on Monday if possible.  Thank you.         Results reviewed with patient.  Pt states that she was off of the Ozempic for a month due to availability and feels this is why her results were elevated.  Completed the urine test.  Has requested assistance with scheduling hematology appt.

## 2023-03-01 ENCOUNTER — TELEPHONE (OUTPATIENT)
Dept: HEMATOLOGY/ONCOLOGY | Facility: CLINIC | Age: 63
End: 2023-03-01
Payer: COMMERCIAL

## 2023-03-02 ENCOUNTER — TELEPHONE (OUTPATIENT)
Dept: HEMATOLOGY/ONCOLOGY | Facility: CLINIC | Age: 63
End: 2023-03-02
Payer: COMMERCIAL

## 2023-03-02 NOTE — PROGRESS NOTES
Subjective:       Name: Nohelia Rodriguez  : 1960  MRN: [id number]    Chief Complaint   Patient presents with    Normocytic anemia      Follow up          HPI: Nohelia Rodriguez is a 63 y.o. female presents for evaluation of Normocytic anemia  (Follow up)  Patient was seen previously by  and  for leukopenia and anemia.  She was due to proceed with a bone marrow biopsy to complete her workup.    Patient had blood workup done on  that showed a white count of 2.53 a hemoglobin of 10 MCV of 88 and platelet count of 224.Her CMP and TSH were normal.  Looking at her blood workup going to 2019 the patient has been chronically leukopenic with a white blood cell count ranging between 2.81-3.67.  Her hemoglobin has been ranging between 10-10.8.       Her workup done previously by Analia Bernal NP showed a normal folate vitamin B12 SPEP with immunofixation quantitative immunoglobulins zinc and copper levels.  Her iron studies showed a normal ferritin with decreased % saturation at 16.  Her free light chains K/L showed a ratio of 1.95.    Last colonoscopy done 3/19 was normal. She does not eat red meat. Last mammogram 3/7/2022 was normal.  The patient denies any fever chills night sweats weight loss melena hematochezia hematemesis hematuria.  She is been having joint pain affecting her hip.  She denies any recent history of traveling or exposure to sick people.  She is called natured and have more chills than night sweats.  She denies any family history of sickle cell disease trait or blood related malignancies.    Oncology History    No history exists.        Past Medical History:   Diagnosis Date    Diabetes mellitus     Diabetic retinopathy     s/p laser treatment    Glaucoma     Hypertension     S/P colonoscopy     3/19 next due 3/29       Past Surgical History:   Procedure Laterality Date    COLONOSCOPY N/A 3/22/2019    Procedure: COLONOSCOPY;  Surgeon: Kishor Membreno MD;  Location:  [facility] ENDO;  Service: Endoscopy;  Laterality: N/A;    diabetic retinopathy laser         Family History   Problem Relation Age of Onset    Diabetes Mother     Cataracts Mother     Hypertension Mother     Diabetes Sister     Hypertension Sister     Hyperlipidemia Sister         a fib    Diabetes Brother     Hypertension Brother     Hydrocephalus Son     Diabetes Brother     Diabetes Sister     Diabetes Sister        Social History     Socioeconomic History    Marital status:    Tobacco Use    Smoking status: Never    Smokeless tobacco: Never   Substance and Sexual Activity    Alcohol use: No    Drug use: No    Sexual activity: Not Currently   Social History Narrative    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       Review of patient's allergies indicates:  No Known Allergies    Review of Systems   Constitutional:  Negative for fatigue and fever.   HENT:  Negative for mouth sores, sore throat, trouble swallowing and voice change.    Eyes:  Negative for photophobia and visual disturbance.   Respiratory:  Negative for cough, chest tightness and shortness of breath.    Cardiovascular:  Negative for chest pain.   Gastrointestinal:  Negative for abdominal pain, blood in stool, constipation and diarrhea.   Endocrine: Positive for cold intolerance.   Genitourinary:  Negative for dysuria and hematuria.   Musculoskeletal:  Negative for arthralgias and joint swelling.   Integumentary:  Negative for pallor, rash and wound.   Neurological:  Negative for dizziness, speech difficulty, weakness and light-headedness.   Hematological:  Does not bruise/bleed easily.   Psychiatric/Behavioral:  Negative for sleep disturbance. The patient is not nervous/anxious.           Objective:     Vitals:    03/06/23 0832   BP: 139/82   BP Location: Left arm   Patient Position: Sitting   BP Method: Medium (Automatic)   Pulse: 88   Resp: 16   Temp: 97.5 °F (36.4 °C)   TempSrc: Temporal   SpO2: 98%   Weight: 85.1 kg (187 lb 9.8 oz)  "  Height: 5' 9" (1.753 m)        Physical Exam  Vitals reviewed.   Constitutional:       Appearance: Normal appearance.   HENT:      Head: Normocephalic and atraumatic.   Eyes:      General: No scleral icterus.     Pupils: Pupils are equal, round, and reactive to light.   Cardiovascular:      Rate and Rhythm: Normal rate and regular rhythm.      Pulses: Normal pulses.      Heart sounds: Normal heart sounds.   Pulmonary:      Effort: Pulmonary effort is normal.      Breath sounds: Normal breath sounds.   Abdominal:      General: Bowel sounds are normal. There is no distension.   Musculoskeletal:         General: No swelling.   Lymphadenopathy:      Cervical: No cervical adenopathy.   Skin:     General: Skin is warm.      Findings: No rash.   Neurological:      General: No focal deficit present.      Mental Status: She is alert and oriented to person, place, and time.      Cranial Nerves: No cranial nerve deficit.      Motor: No weakness.   Psychiatric:         Mood and Affect: Mood normal.         Behavior: Behavior normal.              Current Outpatient Medications on File Prior to Visit   Medication Sig    amLODIPine (NORVASC) 10 MG tablet Take 1 tablet by mouth once daily (Patient taking differently: Take 5 mg by mouth once daily.)    blood sugar diagnostic Strp To check BG QD times daily, to use with insurance preferred meter    blood-glucose meter kit As directed    carvediloL (COREG) 25 MG tablet TAKE 1 TABLET BY MOUTH TWICE DAILY WITH MEALS    lancets Misc To check BG QD times daily, to use with insurance preferred meter    latanoprost 0.005 % ophthalmic solution Place 1 drop into both eyes every evening.     lisinopriL (PRINIVIL,ZESTRIL) 5 MG tablet Take 1 tablet (5 mg total) by mouth once daily.    metFORMIN (GLUCOPHAGE) 1000 MG tablet TAKE 1 TABLET BY MOUTH TWICE DAILY WITH MEALS    ONETOUCH DELICA PLUS LANCET 33 gauge Misc Apply topically 3 (three) times daily.    rosuvastatin (CRESTOR) 10 MG tablet Take " 1 tablet by mouth once daily    semaglutide (OZEMPIC) 1 mg/dose (4 mg/3 mL) Inject 1 mg into the skin every 7 days.     No current facility-administered medications on file prior to visit.       CBC:  Lab Results   Component Value Date    WBC 2.53 (L) 02/13/2023    HGB 10.0 (L) 02/13/2023    HCT 31.8 (L) 02/13/2023    MCV 88 02/13/2023     02/13/2023         CMP:  Sodium   Date Value Ref Range Status   02/13/2023 140 136 - 145 mmol/L Final     Potassium   Date Value Ref Range Status   02/13/2023 4.0 3.5 - 5.1 mmol/L Final     Chloride   Date Value Ref Range Status   02/13/2023 106 95 - 110 mmol/L Final     CO2   Date Value Ref Range Status   02/13/2023 23 23 - 29 mmol/L Final     Glucose   Date Value Ref Range Status   02/13/2023 115 (H) 70 - 110 mg/dL Final     BUN   Date Value Ref Range Status   02/13/2023 16 8 - 23 mg/dL Final     Creatinine   Date Value Ref Range Status   02/13/2023 0.9 0.5 - 1.4 mg/dL Final     Calcium   Date Value Ref Range Status   02/13/2023 10.4 8.7 - 10.5 mg/dL Final     Total Protein   Date Value Ref Range Status   02/13/2023 7.0 6.0 - 8.4 g/dL Final     Albumin   Date Value Ref Range Status   02/13/2023 3.9 3.5 - 5.2 g/dL Final     Total Bilirubin   Date Value Ref Range Status   02/13/2023 0.4 0.1 - 1.0 mg/dL Final     Comment:     For infants and newborns, interpretation of results should be based  on gestational age, weight and in agreement with clinical  observations.    Premature Infant recommended reference ranges:  Up to 24 hours.............<8.0 mg/dL  Up to 48 hours............<12.0 mg/dL  3-5 days..................<15.0 mg/dL  6-29 days.................<15.0 mg/dL       Alkaline Phosphatase   Date Value Ref Range Status   02/13/2023 33 (L) 55 - 135 U/L Final     AST   Date Value Ref Range Status   02/13/2023 14 10 - 40 U/L Final     ALT   Date Value Ref Range Status   02/13/2023 6 (L) 10 - 44 U/L Final     Anion Gap   Date Value Ref Range Status   02/13/2023 11 8 - 16  mmol/L Final     eGFR if    Date Value Ref Range Status   06/27/2022 >60.0 >60 mL/min/1.73 m^2 Final     eGFR if non    Date Value Ref Range Status   06/27/2022 >60.0 >60 mL/min/1.73 m^2 Final     Comment:     Calculation used to obtain the estimated glomerular filtration  rate (eGFR) is the CKD-EPI equation.          Posterior Segment OCT Retina-Both eyes  See prog note  Fluorescein Angiography - OU - Both Eyes  See prog note       ECOG SCORE    0 - Fully active-able to carry on all pre-disease performance without restriction              Assessment/Plan:       1- Normocytic anemia:  -I reviewed independently the patient medical record including her laboratory and radiologic findings.  The patient current workup is showing that she has a chronic normocytic anemia of unknown etiology.  Her labs ruled out vitamin B12 folate thyroid dysfunction as well as iron-deficiency.  -she will have today further blood workup drawn that will include CBC iron study DANISHA ESR LDH haptoglobin vitamin B12 hemoglobin electrophoresis and a UA.  She will be seen back again in 2 weeks to discuss the results of her blood workup.  Depending on the results further recommendation will be given concerning either observation or proceeding with a bone marrow biopsy.    2-Leukopenia  -after reviewing her blood workup going to 2019 the patient has been chronically leukopenic.  This could be related ethnic variation and less likely related to blood related disorder.  The patient is asymptomatic and denies any history of recurrent infections.  We will continue to monitor this abnormality with repeat CBC.  In case this worsened a bone marrow biopsy will be ordered to assess if there is a myelodysplastic disorder behind her chronic leukopenia.      3-DM type 2:  -controlled by taking metformin and Ozempic.    4- HTN:  -controlled by taking lisinopril.                   Med Onc Chart Routing      Follow up with physician  2 weeks. to discuss theresults of blood work-up   Follow up with GRETEL    Infusion scheduling note    Injection scheduling note    Labs CBC, ferritin, LDH, vitamin B12, free light chains and SPEP   Lab interval:  ESR, hemoglobin electrophoresis, Haptoglobin, DANISHA UA,IF   Imaging    Pharmacy appointment    Other referrals         Plan was discussed with the patient at length, and she verbalized understanding. Nohelia was given an opportunity to ask questions that were answered to her satisfaction, and she was advised to call in the interval if any problems or questions arise.  Signed:  Yolanda Gonzales MD   Hematology and Oncology  [facility] - HEMATOLOGY ONCOLOGY  [facility], [place]

## 2023-03-03 ENCOUNTER — TELEPHONE (OUTPATIENT)
Dept: HEMATOLOGY/ONCOLOGY | Facility: CLINIC | Age: 63
End: 2023-03-03
Payer: COMMERCIAL

## 2023-03-06 ENCOUNTER — OFFICE VISIT (OUTPATIENT)
Dept: HEMATOLOGY/ONCOLOGY | Facility: CLINIC | Age: 63
End: 2023-03-06
Payer: COMMERCIAL

## 2023-03-06 ENCOUNTER — LAB VISIT (OUTPATIENT)
Dept: LAB | Facility: HOSPITAL | Age: 63
End: 2023-03-06
Attending: INTERNAL MEDICINE
Payer: COMMERCIAL

## 2023-03-06 VITALS
HEIGHT: 69 IN | SYSTOLIC BLOOD PRESSURE: 139 MMHG | BODY MASS INDEX: 27.79 KG/M2 | TEMPERATURE: 98 F | DIASTOLIC BLOOD PRESSURE: 82 MMHG | RESPIRATION RATE: 16 BRPM | WEIGHT: 187.63 LBS | HEART RATE: 88 BPM | OXYGEN SATURATION: 98 %

## 2023-03-06 DIAGNOSIS — D72.819 LEUKOPENIA, UNSPECIFIED TYPE: Primary | ICD-10-CM

## 2023-03-06 DIAGNOSIS — I10 PRIMARY HYPERTENSION: ICD-10-CM

## 2023-03-06 DIAGNOSIS — M25.50 ARTHRALGIA, UNSPECIFIED JOINT: ICD-10-CM

## 2023-03-06 DIAGNOSIS — D72.819 LEUKOPENIA, UNSPECIFIED TYPE: ICD-10-CM

## 2023-03-06 DIAGNOSIS — D64.9 ANEMIA, NORMOCYTIC NORMOCHROMIC: ICD-10-CM

## 2023-03-06 DIAGNOSIS — D64.9 ANEMIA, NORMOCYTIC NORMOCHROMIC: Primary | ICD-10-CM

## 2023-03-06 DIAGNOSIS — Z79.4 CONTROLLED TYPE 2 DIABETES MELLITUS WITH BOTH EYES AFFECTED BY PROLIFERATIVE RETINOPATHY AND MACULAR EDEMA, WITH LONG-TERM CURRENT USE OF INSULIN: ICD-10-CM

## 2023-03-06 DIAGNOSIS — E11.3513 CONTROLLED TYPE 2 DIABETES MELLITUS WITH BOTH EYES AFFECTED BY PROLIFERATIVE RETINOPATHY AND MACULAR EDEMA, WITH LONG-TERM CURRENT USE OF INSULIN: ICD-10-CM

## 2023-03-06 PROBLEM — D50.8 IRON DEFICIENCY ANEMIA SECONDARY TO INADEQUATE DIETARY IRON INTAKE: Status: RESOLVED | Noted: 2019-06-21 | Resolved: 2023-03-06

## 2023-03-06 LAB
BACTERIA #/AREA URNS HPF: ABNORMAL /HPF
BASOPHILS # BLD AUTO: 0.02 K/UL (ref 0–0.2)
BASOPHILS NFR BLD: 0.6 % (ref 0–1.9)
BILIRUB UR QL STRIP: NEGATIVE
CLARITY UR: CLEAR
COLOR UR: YELLOW
DIFFERENTIAL METHOD: ABNORMAL
EOSINOPHIL # BLD AUTO: 0.1 K/UL (ref 0–0.5)
EOSINOPHIL NFR BLD: 2.5 % (ref 0–8)
ERYTHROCYTE [DISTWIDTH] IN BLOOD BY AUTOMATED COUNT: 13.5 % (ref 11.5–14.5)
ERYTHROCYTE [SEDIMENTATION RATE] IN BLOOD BY PHOTOMETRIC METHOD: 23 MM/HR (ref 0–36)
FERRITIN SERPL-MCNC: 110 NG/ML (ref 20–300)
GLUCOSE UR QL STRIP: NEGATIVE
HAPTOGLOB SERPL-MCNC: 158 MG/DL (ref 30–250)
HCT VFR BLD AUTO: 31.1 % (ref 37–48.5)
HGB BLD-MCNC: 10.1 G/DL (ref 12–16)
HGB UR QL STRIP: ABNORMAL
HYALINE CASTS #/AREA URNS LPF: 0 /LPF
IMM GRANULOCYTES # BLD AUTO: 0 K/UL (ref 0–0.04)
IMM GRANULOCYTES NFR BLD AUTO: 0 % (ref 0–0.5)
IRON SERPL-MCNC: 64 UG/DL (ref 30–160)
KETONES UR QL STRIP: NEGATIVE
LDH SERPL L TO P-CCNC: 151 U/L (ref 110–260)
LEUKOCYTE ESTERASE UR QL STRIP: ABNORMAL
LYMPHOCYTES # BLD AUTO: 1 K/UL (ref 1–4.8)
LYMPHOCYTES NFR BLD: 32.1 % (ref 18–48)
MCH RBC QN AUTO: 28.1 PG (ref 27–31)
MCHC RBC AUTO-ENTMCNC: 32.5 G/DL (ref 32–36)
MCV RBC AUTO: 87 FL (ref 82–98)
MICROSCOPIC COMMENT: ABNORMAL
MONOCYTES # BLD AUTO: 0.2 K/UL (ref 0.3–1)
MONOCYTES NFR BLD: 7.6 % (ref 4–15)
NEUTROPHILS # BLD AUTO: 1.8 K/UL (ref 1.8–7.7)
NEUTROPHILS NFR BLD: 57.2 % (ref 38–73)
NITRITE UR QL STRIP: NEGATIVE
NRBC BLD-RTO: 0 /100 WBC
PH UR STRIP: 7 [PH] (ref 5–8)
PLATELET # BLD AUTO: 215 K/UL (ref 150–450)
PMV BLD AUTO: 10.4 FL (ref 9.2–12.9)
PROT UR QL STRIP: ABNORMAL
RBC # BLD AUTO: 3.59 M/UL (ref 4–5.4)
RBC #/AREA URNS HPF: 1 /HPF (ref 0–4)
SATURATED IRON: 16 % (ref 20–50)
SP GR UR STRIP: 1.01 (ref 1–1.03)
SQUAMOUS #/AREA URNS HPF: 2 /HPF
TOTAL IRON BINDING CAPACITY: 392 UG/DL (ref 250–450)
TRANSFERRIN SERPL-MCNC: 265 MG/DL (ref 200–375)
URN SPEC COLLECT METH UR: ABNORMAL
VIT B12 SERPL-MCNC: 560 PG/ML (ref 210–950)
WBC # BLD AUTO: 3.15 K/UL (ref 3.9–12.7)
WBC #/AREA URNS HPF: 22 /HPF (ref 0–5)
WBC CLUMPS URNS QL MICRO: ABNORMAL

## 2023-03-06 PROCEDURE — 84466 ASSAY OF TRANSFERRIN: CPT | Performed by: INTERNAL MEDICINE

## 2023-03-06 PROCEDURE — 4010F ACE/ARB THERAPY RXD/TAKEN: CPT | Mod: CPTII,S$GLB,, | Performed by: INTERNAL MEDICINE

## 2023-03-06 PROCEDURE — 83615 LACTATE (LD) (LDH) ENZYME: CPT | Mod: PN | Performed by: INTERNAL MEDICINE

## 2023-03-06 PROCEDURE — 3066F PR DOCUMENTATION OF TREATMENT FOR NEPHROPATHY: ICD-10-PCS | Mod: CPTII,S$GLB,, | Performed by: INTERNAL MEDICINE

## 2023-03-06 PROCEDURE — 36415 COLL VENOUS BLD VENIPUNCTURE: CPT | Mod: PN | Performed by: INTERNAL MEDICINE

## 2023-03-06 PROCEDURE — 81000 URINALYSIS NONAUTO W/SCOPE: CPT | Mod: PN | Performed by: INTERNAL MEDICINE

## 2023-03-06 PROCEDURE — 3052F PR MOST RECENT HEMOGLOBIN A1C LEVEL 8.0 - < 9.0%: ICD-10-PCS | Mod: CPTII,S$GLB,, | Performed by: INTERNAL MEDICINE

## 2023-03-06 PROCEDURE — 3066F NEPHROPATHY DOC TX: CPT | Mod: CPTII,S$GLB,, | Performed by: INTERNAL MEDICINE

## 2023-03-06 PROCEDURE — 3008F BODY MASS INDEX DOCD: CPT | Mod: CPTII,S$GLB,, | Performed by: INTERNAL MEDICINE

## 2023-03-06 PROCEDURE — 1160F PR REVIEW ALL MEDS BY PRESCRIBER/CLIN PHARMACIST DOCUMENTED: ICD-10-PCS | Mod: CPTII,S$GLB,, | Performed by: INTERNAL MEDICINE

## 2023-03-06 PROCEDURE — 83010 ASSAY OF HAPTOGLOBIN QUANT: CPT | Performed by: INTERNAL MEDICINE

## 2023-03-06 PROCEDURE — 1159F MED LIST DOCD IN RCRD: CPT | Mod: CPTII,S$GLB,, | Performed by: INTERNAL MEDICINE

## 2023-03-06 PROCEDURE — 83020 HEMOGLOBIN ELECTROPHORESIS: CPT | Performed by: INTERNAL MEDICINE

## 2023-03-06 PROCEDURE — 3060F POS MICROALBUMINURIA REV: CPT | Mod: CPTII,S$GLB,, | Performed by: INTERNAL MEDICINE

## 2023-03-06 PROCEDURE — 82607 VITAMIN B-12: CPT | Performed by: INTERNAL MEDICINE

## 2023-03-06 PROCEDURE — 85652 RBC SED RATE AUTOMATED: CPT | Performed by: INTERNAL MEDICINE

## 2023-03-06 PROCEDURE — 3075F SYST BP GE 130 - 139MM HG: CPT | Mod: CPTII,S$GLB,, | Performed by: INTERNAL MEDICINE

## 2023-03-06 PROCEDURE — 1160F RVW MEDS BY RX/DR IN RCRD: CPT | Mod: CPTII,S$GLB,, | Performed by: INTERNAL MEDICINE

## 2023-03-06 PROCEDURE — 85025 COMPLETE CBC W/AUTO DIFF WBC: CPT | Mod: PN | Performed by: INTERNAL MEDICINE

## 2023-03-06 PROCEDURE — 99215 OFFICE O/P EST HI 40 MIN: CPT | Mod: S$GLB,,, | Performed by: INTERNAL MEDICINE

## 2023-03-06 PROCEDURE — 3052F HG A1C>EQUAL 8.0%<EQUAL 9.0%: CPT | Mod: CPTII,S$GLB,, | Performed by: INTERNAL MEDICINE

## 2023-03-06 PROCEDURE — 1159F PR MEDICATION LIST DOCUMENTED IN MEDICAL RECORD: ICD-10-PCS | Mod: CPTII,S$GLB,, | Performed by: INTERNAL MEDICINE

## 2023-03-06 PROCEDURE — 82728 ASSAY OF FERRITIN: CPT | Mod: 91 | Performed by: INTERNAL MEDICINE

## 2023-03-06 PROCEDURE — 3079F PR MOST RECENT DIASTOLIC BLOOD PRESSURE 80-89 MM HG: ICD-10-PCS | Mod: CPTII,S$GLB,, | Performed by: INTERNAL MEDICINE

## 2023-03-06 PROCEDURE — 99999 PR PBB SHADOW E&M-EST. PATIENT-LVL IV: ICD-10-PCS | Mod: PBBFAC,,, | Performed by: INTERNAL MEDICINE

## 2023-03-06 PROCEDURE — 99999 PR PBB SHADOW E&M-EST. PATIENT-LVL IV: CPT | Mod: PBBFAC,,, | Performed by: INTERNAL MEDICINE

## 2023-03-06 PROCEDURE — 3079F DIAST BP 80-89 MM HG: CPT | Mod: CPTII,S$GLB,, | Performed by: INTERNAL MEDICINE

## 2023-03-06 PROCEDURE — 3008F PR BODY MASS INDEX (BMI) DOCUMENTED: ICD-10-PCS | Mod: CPTII,S$GLB,, | Performed by: INTERNAL MEDICINE

## 2023-03-06 PROCEDURE — 3060F PR POS MICROALBUMINURIA RESULT DOCUMENTED/REVIEW: ICD-10-PCS | Mod: CPTII,S$GLB,, | Performed by: INTERNAL MEDICINE

## 2023-03-06 PROCEDURE — 99215 PR OFFICE/OUTPT VISIT, EST, LEVL V, 40-54 MIN: ICD-10-PCS | Mod: S$GLB,,, | Performed by: INTERNAL MEDICINE

## 2023-03-06 PROCEDURE — 86038 ANTINUCLEAR ANTIBODIES: CPT | Performed by: INTERNAL MEDICINE

## 2023-03-06 PROCEDURE — 3075F PR MOST RECENT SYSTOLIC BLOOD PRESS GE 130-139MM HG: ICD-10-PCS | Mod: CPTII,S$GLB,, | Performed by: INTERNAL MEDICINE

## 2023-03-06 PROCEDURE — 4010F PR ACE/ARB THEARPY RXD/TAKEN: ICD-10-PCS | Mod: CPTII,S$GLB,, | Performed by: INTERNAL MEDICINE

## 2023-03-07 ENCOUNTER — TELEPHONE (OUTPATIENT)
Dept: HEMATOLOGY/ONCOLOGY | Facility: CLINIC | Age: 63
End: 2023-03-07
Payer: COMMERCIAL

## 2023-03-07 DIAGNOSIS — D64.9 ANEMIA, NORMOCYTIC NORMOCHROMIC: Primary | ICD-10-CM

## 2023-03-07 DIAGNOSIS — D72.819 LEUKOPENIA, UNSPECIFIED TYPE: ICD-10-CM

## 2023-03-07 LAB — ANA SER QL IF: NORMAL

## 2023-03-07 NOTE — TELEPHONE ENCOUNTER
Called pt regarding labs not drawn yesterday. Left message for pt to call me back so I can schedule her to have labs draw. Waiting on return telephone call.

## 2023-03-09 LAB
FERRITIN SERPL-MCNC: 77 MCG/L (ref 11–307)
HGB A MFR BLD ELPH: 97.4 % (ref 95.8–98)
HGB A2 MFR BLD: 2.6 % (ref 2–3.3)
HGB A2+XXX MFR BLD ELPH: NORMAL %
HGB F MFR BLD: 0 % (ref 0–0.9)
HGB XXX MFR BLD ELPH: NORMAL %
PATH REV BLD -IMP: NORMAL
PROVIDER SIGNING NAME: NORMAL

## 2023-03-15 DIAGNOSIS — Z12.31 OTHER SCREENING MAMMOGRAM: ICD-10-CM

## 2023-03-16 ENCOUNTER — PATIENT OUTREACH (OUTPATIENT)
Dept: ADMINISTRATIVE | Facility: HOSPITAL | Age: 63
End: 2023-03-16
Payer: COMMERCIAL

## 2023-03-16 ENCOUNTER — TELEPHONE (OUTPATIENT)
Dept: HEMATOLOGY/ONCOLOGY | Facility: CLINIC | Age: 63
End: 2023-03-16
Payer: COMMERCIAL

## 2023-03-16 ENCOUNTER — PATIENT MESSAGE (OUTPATIENT)
Dept: ADMINISTRATIVE | Facility: HOSPITAL | Age: 63
End: 2023-03-16
Payer: COMMERCIAL

## 2023-03-24 ENCOUNTER — TELEPHONE (OUTPATIENT)
Dept: HEMATOLOGY/ONCOLOGY | Facility: CLINIC | Age: 63
End: 2023-03-24
Payer: COMMERCIAL

## 2023-03-24 NOTE — TELEPHONE ENCOUNTER
Spoke with the pt and got the pt scheduled with labs and md appt. Pt verbalized and understanding.

## 2023-03-24 NOTE — TELEPHONE ENCOUNTER
Left message to call the office to schedule/reschedule appt. Recall number provided.  ----- Message from Jeanne Garcia sent at 3/24/2023  3:15 PM CDT -----  Regarding: Dr. Gonzales  Type: Needs Medical Advice  Who Called:  Patient   Symptoms (please be specific):    How long has patient had these symptoms:    Pharmacy name and phone #:    Best Call Back Number: [phone]  Additional Information: Patient is requesting a call back to reschedule an appt. with Dr. Gonzales.

## 2023-03-27 ENCOUNTER — LAB VISIT (OUTPATIENT)
Dept: LAB | Facility: HOSPITAL | Age: 63
End: 2023-03-27
Attending: INTERNAL MEDICINE
Payer: COMMERCIAL

## 2023-03-27 DIAGNOSIS — D64.9 ANEMIA, NORMOCYTIC NORMOCHROMIC: ICD-10-CM

## 2023-03-27 DIAGNOSIS — D72.819 LEUKOPENIA, UNSPECIFIED TYPE: ICD-10-CM

## 2023-03-27 PROCEDURE — 83521 IG LIGHT CHAINS FREE EACH: CPT | Mod: 59 | Performed by: INTERNAL MEDICINE

## 2023-03-27 PROCEDURE — 86334 IMMUNOFIX E-PHORESIS SERUM: CPT | Mod: 26,,, | Performed by: PATHOLOGY

## 2023-03-27 PROCEDURE — 86334 IMMUNOFIX E-PHORESIS SERUM: CPT | Performed by: INTERNAL MEDICINE

## 2023-03-27 PROCEDURE — 84165 PROTEIN E-PHORESIS SERUM: CPT | Mod: 26,,, | Performed by: PATHOLOGY

## 2023-03-27 PROCEDURE — 84165 PATHOLOGIST INTERPRETATION SPE: ICD-10-PCS | Mod: 26,,, | Performed by: PATHOLOGY

## 2023-03-27 PROCEDURE — 84165 PROTEIN E-PHORESIS SERUM: CPT | Performed by: INTERNAL MEDICINE

## 2023-03-27 PROCEDURE — 86334 PATHOLOGIST INTERPRETATION IFE: ICD-10-PCS | Mod: 26,,, | Performed by: PATHOLOGY

## 2023-03-27 PROCEDURE — 36415 COLL VENOUS BLD VENIPUNCTURE: CPT | Mod: PN | Performed by: INTERNAL MEDICINE

## 2023-03-28 LAB
ALBUMIN SERPL ELPH-MCNC: 4.04 G/DL (ref 3.35–5.55)
ALPHA1 GLOB SERPL ELPH-MCNC: 0.26 G/DL (ref 0.17–0.41)
ALPHA2 GLOB SERPL ELPH-MCNC: 0.61 G/DL (ref 0.43–0.99)
B-GLOBULIN SERPL ELPH-MCNC: 0.81 G/DL (ref 0.5–1.1)
GAMMA GLOB SERPL ELPH-MCNC: 1.17 G/DL (ref 0.67–1.58)
INTERPRETATION SERPL IFE-IMP: NORMAL
KAPPA LC SER QL IA: 4.16 MG/DL (ref 0.33–1.94)
KAPPA LC/LAMBDA SER IA: 1.7 (ref 0.26–1.65)
LAMBDA LC SER QL IA: 2.44 MG/DL (ref 0.57–2.63)
PATHOLOGIST INTERPRETATION IFE: NORMAL
PROT SERPL-MCNC: 6.9 G/DL (ref 6–8.4)

## 2023-03-29 LAB — PATHOLOGIST INTERPRETATION SPE: NORMAL

## 2023-03-31 NOTE — PROGRESS NOTES
Subjective:       Name: Nohelia Rodrgiuez  : 1960  MRN: [id number]    Chief Complaint   Patient presents with    Anemia, normocytic normochromic     2 wk follow up with labs          HPI: Nohelia Rodriguez is a 63 y.o. female presents to discuss the evaluation done to assess Anemia, normocytic normochromic (2 wk follow up with labs)  Patient was seen previously by  and  for leukopenia and anemia.  She was due to proceed with a bone marrow biopsy to complete her workup.    She had blood workup done on  that showed a white count of 3.15 a hemoglobin of 10.1 MCV of 87 and platelet count of 215.  Ferritin 110, iron saturation of 16 within normal iron and TIBC, hemoglobin and electrophoresis came back normal.  Alpha thalassemia gene analysis was not performed.  LDH ESR haptoglobin vitamin B12 DANISHA came back normal.  UA microscopy showed the presence of 2+ leukocytes and occult blood.  There was 2+ protein as well.  SPEP with immunofixation showed no monoclonal peaks identified.  The free light chain showed an elevation of her kappa free light chain of 4.16. Lambda free light chain was normal.  The ratio was 1.7, improved compared to her previous blood workup.      Last colonoscopy done 3/19 was normal. She does not eat red meat. Last mammogram 3/7/2022 was normal.  The patient denies any fever chills night sweats weight loss melena hematochezia hematemesis hematuria. She denies any recent history of traveling or exposure to sick people.  She is called natured and have more chills than night sweats.  She denies any family history of sickle cell disease trait or blood related malignancies.    Oncology History    No history exists.        Past Medical History:   Diagnosis Date    Diabetes mellitus     Diabetic retinopathy     s/p laser treatment    Glaucoma     Hypertension     S/P colonoscopy     3/19 next due 3/29       Past Surgical History:   Procedure Laterality Date    COLONOSCOPY N/A  3/22/2019    Procedure: COLONOSCOPY;  Surgeon: Kishor Membreno MD;  [facility];  Service: Endoscopy;  Laterality: N/A;    diabetic retinopathy laser         Family History   Problem Relation Age of Onset    Diabetes Mother     Cataracts Mother     Hypertension Mother     Diabetes Sister     Hypertension Sister     Hyperlipidemia Sister         a fib    Diabetes Brother     Hypertension Brother     Hydrocephalus Son     Diabetes Brother     Diabetes Sister     Diabetes Sister        Social History     Socioeconomic History    Marital status:    Tobacco Use    Smoking status: Never    Smokeless tobacco: Never   Substance and Sexual Activity    Alcohol use: No    Drug use: No    Sexual activity: Not Currently   Social History Narrative    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       Review of patient's allergies indicates:  No Known Allergies    Review of Systems   Constitutional:  Negative for fatigue and fever.   HENT:  Negative for mouth sores, sore throat, trouble swallowing and voice change.    Eyes:  Negative for photophobia and visual disturbance.   Respiratory:  Negative for cough, chest tightness and shortness of breath.    Cardiovascular:  Negative for chest pain.   Gastrointestinal:  Negative for abdominal pain, blood in stool, constipation and diarrhea.   Endocrine: Positive for cold intolerance.   Genitourinary:  Negative for dysuria and hematuria.   Musculoskeletal:  Negative for arthralgias and joint swelling.   Integumentary:  Negative for pallor, rash and wound.   Neurological:  Negative for dizziness, speech difficulty, weakness and light-headedness.   Hematological:  Does not bruise/bleed easily.   Psychiatric/Behavioral:  Negative for sleep disturbance. The patient is not nervous/anxious.           Objective:     Vitals:    04/03/23 0836   BP: (!) 157/89   BP Location: Left arm   Patient Position: Sitting   BP Method: Medium (Automatic)   Pulse: 86   Resp: 16  "  Temp: 97.4 °F (36.3 °C)   TempSrc: Temporal   SpO2: 98%   Weight: 84.8 kg (186 lb 15.2 oz)   Height: 5' 9" (1.753 m)          Physical Exam  Vitals reviewed.   Constitutional:       Appearance: Normal appearance.   HENT:      Head: Normocephalic and atraumatic.   Eyes:      General: No scleral icterus.     Pupils: Pupils are equal, round, and reactive to light.   Cardiovascular:      Rate and Rhythm: Normal rate and regular rhythm.      Pulses: Normal pulses.      Heart sounds: Normal heart sounds.   Pulmonary:      Effort: Pulmonary effort is normal.      Breath sounds: Normal breath sounds.   Abdominal:      General: Bowel sounds are normal. There is no distension.   Musculoskeletal:         General: No swelling.   Lymphadenopathy:      Cervical: No cervical adenopathy.   Skin:     General: Skin is warm.      Findings: No rash.   Neurological:      General: No focal deficit present.      Mental Status: She is alert and oriented to person, place, and time.      Cranial Nerves: No cranial nerve deficit.      Motor: No weakness.   Psychiatric:         Mood and Affect: Mood normal.         Behavior: Behavior normal.              Current Outpatient Medications on File Prior to Visit   Medication Sig    amLODIPine (NORVASC) 10 MG tablet Take 1 tablet by mouth once daily (Patient taking differently: Take 5 mg by mouth once daily.)    blood sugar diagnostic Strp To check BG QD times daily, to use with insurance preferred meter    blood-glucose meter kit As directed    carvediloL (COREG) 25 MG tablet TAKE 1 TABLET BY MOUTH TWICE DAILY WITH MEALS    lancets Misc To check BG QD times daily, to use with insurance preferred meter    latanoprost 0.005 % ophthalmic solution Place 1 drop into both eyes every evening.     lisinopriL (PRINIVIL,ZESTRIL) 5 MG tablet Take 1 tablet by mouth once daily    metFORMIN (GLUCOPHAGE) 1000 MG tablet TAKE 1 TABLET BY MOUTH TWICE DAILY WITH MEALS    ONETOUCH DELICA PLUS LANCET 33 gauge Misc " Apply topically 3 (three) times daily.    rosuvastatin (CRESTOR) 10 MG tablet Take 1 tablet by mouth once daily    semaglutide (OZEMPIC) 1 mg/dose (4 mg/3 mL) Inject 1 mg into the skin every 7 days.     No current facility-administered medications on file prior to visit.       CBC:  Lab Results   Component Value Date    WBC 3.15 (L) 03/06/2023    HGB 10.1 (L) 03/06/2023    HCT 31.1 (L) 03/06/2023    MCV 87 03/06/2023     03/06/2023         CMP:  Sodium   Date Value Ref Range Status   02/13/2023 140 136 - 145 mmol/L Final     Potassium   Date Value Ref Range Status   02/13/2023 4.0 3.5 - 5.1 mmol/L Final     Chloride   Date Value Ref Range Status   02/13/2023 106 95 - 110 mmol/L Final     CO2   Date Value Ref Range Status   02/13/2023 23 23 - 29 mmol/L Final     Glucose   Date Value Ref Range Status   02/13/2023 115 (H) 70 - 110 mg/dL Final     BUN   Date Value Ref Range Status   02/13/2023 16 8 - 23 mg/dL Final     Creatinine   Date Value Ref Range Status   02/13/2023 0.9 0.5 - 1.4 mg/dL Final     Calcium   Date Value Ref Range Status   02/13/2023 10.4 8.7 - 10.5 mg/dL Final     Total Protein   Date Value Ref Range Status   02/13/2023 7.0 6.0 - 8.4 g/dL Final     Albumin   Date Value Ref Range Status   02/13/2023 3.9 3.5 - 5.2 g/dL Final     Total Bilirubin   Date Value Ref Range Status   02/13/2023 0.4 0.1 - 1.0 mg/dL Final     Comment:     For infants and newborns, interpretation of results should be based  on gestational age, weight and in agreement with clinical  observations.    Premature Infant recommended reference ranges:  Up to 24 hours.............<8.0 mg/dL  Up to 48 hours............<12.0 mg/dL  3-5 days..................<15.0 mg/dL  6-29 days.................<15.0 mg/dL       Alkaline Phosphatase   Date Value Ref Range Status   02/13/2023 33 (L) 55 - 135 U/L Final     AST   Date Value Ref Range Status   02/13/2023 14 10 - 40 U/L Final     ALT   Date Value Ref Range Status   02/13/2023 6 (L) 10 -  44 U/L Final     Anion Gap   Date Value Ref Range Status   02/13/2023 11 8 - 16 mmol/L Final     eGFR if    Date Value Ref Range Status   06/27/2022 >60.0 >60 mL/min/1.73 m^2 Final     eGFR if non    Date Value Ref Range Status   06/27/2022 >60.0 >60 mL/min/1.73 m^2 Final     Comment:     Calculation used to obtain the estimated glomerular filtration  rate (eGFR) is the CKD-EPI equation.          Posterior Segment OCT Retina-Both eyes  See prog note  Fluorescein Angiography - OU - Both Eyes  See prog note       ECOG SCORE    1 - Restricted in strenuous activity-ambulatory and able to carry out work of a light nature              Assessment/Plan:       1- Normocytic anemia:  -I reviewed independently the patient medical record including her laboratory and radiologic findings.  The patient current workup is showing that she has a chronic normocytic anemia of unknown etiology.  Her labs ruled out vitamin B12 folate thyroid dysfunction as well as iron-deficiency.  - DANISHA came back negative.   -ESR LDH haptoglobin were normal ruling out hemolysisis.  - hemoglobin electrophoresis was negative for sickle cell disease or trait.  -The UA was negative of hematuria.  All workup failed to show a source that could explain normocytic anemia.  She will require a  bone marrow biopsy to assess possibly a bone marrow dysfunction.  The patient decided to proceed with close monitoring with repeat blood workup.  She will be seen back again in 4 months with repeat CBC CMP ESR LDH.  In case her hemoglobin dropped below 10 at that time will proceed with a bone marrow biopsy.    2-Leukopenia  -after reviewing her blood workup going to 2019 the patient has been chronically leukopenic.  This could be related ethnic variation and less likely related to blood related disorder.  The patient is asymptomatic and denies any history of recurrent infections.  We will continue to monitor this abnormality with repeat CBC.   In case this worsened a bone marrow biopsy will be ordered to assess if there is a myelodysplastic disorder behind her chronic leukopenia.      3-DM type 2:  -controlled by taking metformin and Ozempic.    4- HTN:  -controlled by taking lisinopril.    5-Overweight BMI:27.61  - Patient was advised to exercise and to loose weight.                   Med Onc Chart Routing      Follow up with physician 4 months. to discuss the results of her labs   Follow up with GRETEL    Infusion scheduling note    Injection scheduling note    Labs CBC, LDH and CMP   Scheduling:  Preferred lab:  Lab interval:  ESR   Imaging    Pharmacy appointment    Other referrals            Plan was discussed with the patient at length, and she verbalized understanding. Nohelia was given an opportunity to ask questions that were answered to her satisfaction, and she was advised to call in the interval if any problems or questions arise.  Signed:  Yolanda Gonzales MD   Hematology and Oncology  [facility] - HEMATOLOGY ONCOLOGY  [facility], [place]

## 2023-04-03 ENCOUNTER — OFFICE VISIT (OUTPATIENT)
Dept: HEMATOLOGY/ONCOLOGY | Facility: CLINIC | Age: 63
End: 2023-04-03
Payer: COMMERCIAL

## 2023-04-03 ENCOUNTER — HOSPITAL ENCOUNTER (OUTPATIENT)
Dept: RADIOLOGY | Facility: HOSPITAL | Age: 63
Discharge: HOME | End: 2023-04-03
Attending: FAMILY MEDICINE
Payer: COMMERCIAL

## 2023-04-03 VITALS
TEMPERATURE: 97 F | WEIGHT: 186.94 LBS | SYSTOLIC BLOOD PRESSURE: 157 MMHG | HEART RATE: 86 BPM | RESPIRATION RATE: 16 BRPM | DIASTOLIC BLOOD PRESSURE: 89 MMHG | HEIGHT: 69 IN | OXYGEN SATURATION: 98 % | BODY MASS INDEX: 27.69 KG/M2

## 2023-04-03 DIAGNOSIS — I10 PRIMARY HYPERTENSION: ICD-10-CM

## 2023-04-03 DIAGNOSIS — Z12.31 OTHER SCREENING MAMMOGRAM: ICD-10-CM

## 2023-04-03 DIAGNOSIS — D72.819 LEUKOPENIA, UNSPECIFIED TYPE: ICD-10-CM

## 2023-04-03 DIAGNOSIS — E11.3513 CONTROLLED TYPE 2 DIABETES MELLITUS WITH BOTH EYES AFFECTED BY PROLIFERATIVE RETINOPATHY AND MACULAR EDEMA, WITH LONG-TERM CURRENT USE OF INSULIN: ICD-10-CM

## 2023-04-03 DIAGNOSIS — Z79.4 CONTROLLED TYPE 2 DIABETES MELLITUS WITH BOTH EYES AFFECTED BY PROLIFERATIVE RETINOPATHY AND MACULAR EDEMA, WITH LONG-TERM CURRENT USE OF INSULIN: ICD-10-CM

## 2023-04-03 DIAGNOSIS — D64.9 ANEMIA, NORMOCYTIC NORMOCHROMIC: Primary | ICD-10-CM

## 2023-04-03 PROCEDURE — 99214 OFFICE O/P EST MOD 30 MIN: CPT | Mod: S$GLB,,, | Performed by: INTERNAL MEDICINE

## 2023-04-03 PROCEDURE — 4010F PR ACE/ARB THEARPY RXD/TAKEN: ICD-10-PCS | Mod: CPTII,S$GLB,, | Performed by: INTERNAL MEDICINE

## 2023-04-03 PROCEDURE — 3077F SYST BP >= 140 MM HG: CPT | Mod: CPTII,S$GLB,, | Performed by: INTERNAL MEDICINE

## 2023-04-03 PROCEDURE — 77067 MAMMO DIGITAL SCREENING BILAT WITH TOMO: ICD-10-PCS | Mod: 26,,, | Performed by: RADIOLOGY

## 2023-04-03 PROCEDURE — 99214 PR OFFICE/OUTPT VISIT, EST, LEVL IV, 30-39 MIN: ICD-10-PCS | Mod: S$GLB,,, | Performed by: INTERNAL MEDICINE

## 2023-04-03 PROCEDURE — 99999 PR PBB SHADOW E&M-EST. PATIENT-LVL IV: ICD-10-PCS | Mod: PBBFAC,,, | Performed by: INTERNAL MEDICINE

## 2023-04-03 PROCEDURE — 1159F PR MEDICATION LIST DOCUMENTED IN MEDICAL RECORD: ICD-10-PCS | Mod: CPTII,S$GLB,, | Performed by: INTERNAL MEDICINE

## 2023-04-03 PROCEDURE — 3060F POS MICROALBUMINURIA REV: CPT | Mod: CPTII,S$GLB,, | Performed by: INTERNAL MEDICINE

## 2023-04-03 PROCEDURE — 3052F HG A1C>EQUAL 8.0%<EQUAL 9.0%: CPT | Mod: CPTII,S$GLB,, | Performed by: INTERNAL MEDICINE

## 2023-04-03 PROCEDURE — 77067 SCR MAMMO BI INCL CAD: CPT | Mod: 26,,, | Performed by: RADIOLOGY

## 2023-04-03 PROCEDURE — 3079F DIAST BP 80-89 MM HG: CPT | Mod: CPTII,S$GLB,, | Performed by: INTERNAL MEDICINE

## 2023-04-03 PROCEDURE — 1160F PR REVIEW ALL MEDS BY PRESCRIBER/CLIN PHARMACIST DOCUMENTED: ICD-10-PCS | Mod: CPTII,S$GLB,, | Performed by: INTERNAL MEDICINE

## 2023-04-03 PROCEDURE — 1160F RVW MEDS BY RX/DR IN RCRD: CPT | Mod: CPTII,S$GLB,, | Performed by: INTERNAL MEDICINE

## 2023-04-03 PROCEDURE — 99999 PR PBB SHADOW E&M-EST. PATIENT-LVL IV: CPT | Mod: PBBFAC,,, | Performed by: INTERNAL MEDICINE

## 2023-04-03 PROCEDURE — 77063 MAMMO DIGITAL SCREENING BILAT WITH TOMO: ICD-10-PCS | Mod: 26,,, | Performed by: RADIOLOGY

## 2023-04-03 PROCEDURE — 77067 SCR MAMMO BI INCL CAD: CPT | Mod: TC,PO

## 2023-04-03 PROCEDURE — 3060F PR POS MICROALBUMINURIA RESULT DOCUMENTED/REVIEW: ICD-10-PCS | Mod: CPTII,S$GLB,, | Performed by: INTERNAL MEDICINE

## 2023-04-03 PROCEDURE — 77063 BREAST TOMOSYNTHESIS BI: CPT | Mod: 26,,, | Performed by: RADIOLOGY

## 2023-04-03 PROCEDURE — 3077F PR MOST RECENT SYSTOLIC BLOOD PRESSURE >= 140 MM HG: ICD-10-PCS | Mod: CPTII,S$GLB,, | Performed by: INTERNAL MEDICINE

## 2023-04-03 PROCEDURE — 1159F MED LIST DOCD IN RCRD: CPT | Mod: CPTII,S$GLB,, | Performed by: INTERNAL MEDICINE

## 2023-04-03 PROCEDURE — 3008F BODY MASS INDEX DOCD: CPT | Mod: CPTII,S$GLB,, | Performed by: INTERNAL MEDICINE

## 2023-04-03 PROCEDURE — 3008F PR BODY MASS INDEX (BMI) DOCUMENTED: ICD-10-PCS | Mod: CPTII,S$GLB,, | Performed by: INTERNAL MEDICINE

## 2023-04-03 PROCEDURE — 3052F PR MOST RECENT HEMOGLOBIN A1C LEVEL 8.0 - < 9.0%: ICD-10-PCS | Mod: CPTII,S$GLB,, | Performed by: INTERNAL MEDICINE

## 2023-04-03 PROCEDURE — 4010F ACE/ARB THERAPY RXD/TAKEN: CPT | Mod: CPTII,S$GLB,, | Performed by: INTERNAL MEDICINE

## 2023-04-03 PROCEDURE — 3066F PR DOCUMENTATION OF TREATMENT FOR NEPHROPATHY: ICD-10-PCS | Mod: CPTII,S$GLB,, | Performed by: INTERNAL MEDICINE

## 2023-04-03 PROCEDURE — 3079F PR MOST RECENT DIASTOLIC BLOOD PRESSURE 80-89 MM HG: ICD-10-PCS | Mod: CPTII,S$GLB,, | Performed by: INTERNAL MEDICINE

## 2023-04-03 PROCEDURE — 3066F NEPHROPATHY DOC TX: CPT | Mod: CPTII,S$GLB,, | Performed by: INTERNAL MEDICINE

## 2023-04-04 ENCOUNTER — PATIENT MESSAGE (OUTPATIENT)
Dept: FAMILY MEDICINE | Facility: CLINIC | Age: 63
End: 2023-04-04
Payer: COMMERCIAL

## 2023-04-06 DIAGNOSIS — Z79.4 CONTROLLED TYPE 2 DIABETES MELLITUS WITH BOTH EYES AFFECTED BY PROLIFERATIVE RETINOPATHY AND MACULAR EDEMA, WITH LONG-TERM CURRENT USE OF INSULIN: Primary | ICD-10-CM

## 2023-04-06 DIAGNOSIS — E11.3513 CONTROLLED TYPE 2 DIABETES MELLITUS WITH BOTH EYES AFFECTED BY PROLIFERATIVE RETINOPATHY AND MACULAR EDEMA, WITH LONG-TERM CURRENT USE OF INSULIN: Primary | ICD-10-CM

## 2023-04-06 RX ORDER — ROSUVASTATIN CALCIUM 10 MG/1
10 TABLET, COATED ORAL DAILY
Qty: 90 TABLET | Refills: 3 | Status: SHIPPED | OUTPATIENT
Start: 2023-04-06 | End: 2023-12-29

## 2023-04-06 RX ORDER — METFORMIN HYDROCHLORIDE 1000 MG/1
1000 TABLET ORAL 2 TIMES DAILY WITH MEALS
Qty: 180 TABLET | Refills: 1 | Status: SHIPPED | OUTPATIENT
Start: 2023-04-06 | End: 2023-10-03

## 2023-04-06 NOTE — TELEPHONE ENCOUNTER
No new care gaps identified.  [facility] Embedded Care Gaps. Reference number: [id number]. 4/06/2023   5:49:57 AM ANGELICAT

## 2023-04-06 NOTE — TELEPHONE ENCOUNTER
Refill Decision Note   Nohelia Rodriguez  is requesting a refill authorization.  Brief Assessment and Rationale for Refill:  Approve     Medication Therapy Plan:         Comments:     Note composed:11:35 AM 04/06/2023             Appointments     Last Visit   1/23/2023 Katelyn Barrett MD   Next Visit   4/24/2023 Katelyn Barrett MD

## 2023-04-10 ENCOUNTER — PATIENT OUTREACH (OUTPATIENT)
Dept: ADMINISTRATIVE | Facility: HOSPITAL | Age: 63
End: 2023-04-10
Payer: COMMERCIAL

## 2023-04-10 NOTE — PROGRESS NOTES
04/10/2023 Care Everywhere updates requested and reviewed.  Immunizations reconciled. Media reports reviewed.  Duplicate HM overrides and  orders removed.  Overdue HM topic chart audit and/or requested.  Overdue lab testing linked to upcoming lab appointments if applies.  Uncontrolled A1C >8SCHEDULED      Hemoglobin A1C   Date Value Ref Range Status   2023 8.1 (H) 4.0 - 5.6 % Final     Comment:     ADA Screening Guidelines:  5.7-6.4%  Consistent with prediabetes  >or=6.5%  Consistent with diabetes    High levels of fetal hemoglobin interfere with the HbA1C  assay. Heterozygous hemoglobin variants (HbS, HgC, etc)do  not significantly interfere with this assay.   However, presence of multiple variants may affect accuracy.     10/03/2022 7.5 (H) 4.0 - 5.6 % Final     Comment:     ADA Screening Guidelines:  5.7-6.4%  Consistent with prediabetes  >or=6.5%  Consistent with diabetes    High levels of fetal hemoglobin interfere with the HbA1C  assay. Heterozygous hemoglobin variants (HbS, HgC, etc)do  not significantly interfere with this assay.   However, presence of multiple variants may affect accuracy.     2022 7.5 (H) 4.0 - 5.6 % Final     Comment:     ADA Screening Guidelines:  5.7-6.4%  Consistent with prediabetes  >or=6.5%  Consistent with diabetes    High levels of fetal hemoglobin interfere with the HbA1C  assay. Heterozygous hemoglobin variants (HbS, HgC, etc)do  not significantly interfere with this assay.   However, presence of multiple variants may affect accuracy.       Future Appointments   Date Time Provider Department [place]   2023 12:20 PM LAB, [facility] LAB [place]   2023  1:00 PM Katelyn Barrett MD [facility]   2023 10:30 AM LAB, [facility] LAB [place]   2023 10:00 AM GIL Avila,FNP-C [facility] ENDOCRN [place]   2023  9:45 AM LAB, [facility] DRAW STATION [facility]   2023 10:40 AM [facility]       Health Maintenance Due   Topic Date Due    Pneumococcal Vaccines (Age 0-64) (2 - PCV) 09/17/2020    COVID-19 Vaccine (4 - Booster for Moderna series) 12/27/2021

## 2023-04-10 NOTE — PROGRESS NOTES
UNCONTROLLED A1C REPORT.  PATIENT HAS AN UPCOMING LAB APPOINTMENT.  CHART REVIEWED;  LABS ORDERED AND LINKED WHAT IS NEEDED    Hemoglobin A1C   Date Value Ref Range Status   02/13/2023 8.1 (H) 4.0 - 5.6 % Final     Comment:     ADA Screening Guidelines:  5.7-6.4%  Consistent with prediabetes  >or=6.5%  Consistent with diabetes    High levels of fetal hemoglobin interfere with the HbA1C  assay. Heterozygous hemoglobin variants (HbS, HgC, etc)do  not significantly interfere with this assay.   However, presence of multiple variants may affect accuracy.     10/03/2022 7.5 (H) 4.0 - 5.6 % Final     Comment:     ADA Screening Guidelines:  5.7-6.4%  Consistent with prediabetes  >or=6.5%  Consistent with diabetes    High levels of fetal hemoglobin interfere with the HbA1C  assay. Heterozygous hemoglobin variants (HbS, HgC, etc)do  not significantly interfere with this assay.   However, presence of multiple variants may affect accuracy.     06/27/2022 7.5 (H) 4.0 - 5.6 % Final     Comment:     ADA Screening Guidelines:  5.7-6.4%  Consistent with prediabetes  >or=6.5%  Consistent with diabetes    High levels of fetal hemoglobin interfere with the HbA1C  assay. Heterozygous hemoglobin variants (HbS, HgC, etc)do  not significantly interfere with this assay.   However, presence of multiple variants may affect accuracy.

## 2023-04-17 ENCOUNTER — LAB VISIT (OUTPATIENT)
Dept: LAB | Facility: HOSPITAL | Age: 63
End: 2023-04-17
Payer: COMMERCIAL

## 2023-04-17 DIAGNOSIS — Z79.4 TYPE 2 DIABETES MELLITUS WITH BOTH EYES AFFECTED BY PROLIFERATIVE RETINOPATHY AND MACULAR EDEMA, WITH LONG-TERM CURRENT USE OF INSULIN: ICD-10-CM

## 2023-04-17 DIAGNOSIS — E11.3513 TYPE 2 DIABETES MELLITUS WITH BOTH EYES AFFECTED BY PROLIFERATIVE RETINOPATHY AND MACULAR EDEMA, WITH LONG-TERM CURRENT USE OF INSULIN: ICD-10-CM

## 2023-04-17 LAB
ESTIMATED AVG GLUCOSE: 160 MG/DL (ref 68–131)
HBA1C MFR BLD: 7.2 % (ref 4–5.6)

## 2023-04-17 PROCEDURE — 36415 COLL VENOUS BLD VENIPUNCTURE: CPT | Mod: PO | Performed by: NURSE PRACTITIONER

## 2023-04-17 PROCEDURE — 83036 HEMOGLOBIN GLYCOSYLATED A1C: CPT | Performed by: NURSE PRACTITIONER

## 2023-05-01 ENCOUNTER — OFFICE VISIT (OUTPATIENT)
Dept: FAMILY MEDICINE | Facility: CLINIC | Age: 63
End: 2023-05-01
Payer: COMMERCIAL

## 2023-05-01 VITALS
BODY MASS INDEX: 27.25 KG/M2 | OXYGEN SATURATION: 98 % | RESPIRATION RATE: 20 BRPM | DIASTOLIC BLOOD PRESSURE: 86 MMHG | HEART RATE: 86 BPM | HEIGHT: 69 IN | TEMPERATURE: 99 F | WEIGHT: 184 LBS | SYSTOLIC BLOOD PRESSURE: 128 MMHG

## 2023-05-01 DIAGNOSIS — I15.2 HYPERTENSION ASSOCIATED WITH DIABETES: ICD-10-CM

## 2023-05-01 DIAGNOSIS — E11.8 DIABETES MELLITUS TYPE 2 WITH COMPLICATIONS: Primary | ICD-10-CM

## 2023-05-01 DIAGNOSIS — E11.59 HYPERTENSION ASSOCIATED WITH DIABETES: ICD-10-CM

## 2023-05-01 DIAGNOSIS — E78.5 HYPERLIPIDEMIA ASSOCIATED WITH TYPE 2 DIABETES MELLITUS: ICD-10-CM

## 2023-05-01 DIAGNOSIS — E11.69 HYPERLIPIDEMIA ASSOCIATED WITH TYPE 2 DIABETES MELLITUS: ICD-10-CM

## 2023-05-01 PROCEDURE — 1160F RVW MEDS BY RX/DR IN RCRD: CPT | Mod: CPTII,S$GLB,, | Performed by: FAMILY MEDICINE

## 2023-05-01 PROCEDURE — 3074F SYST BP LT 130 MM HG: CPT | Mod: CPTII,S$GLB,, | Performed by: FAMILY MEDICINE

## 2023-05-01 PROCEDURE — 3079F DIAST BP 80-89 MM HG: CPT | Mod: CPTII,S$GLB,, | Performed by: FAMILY MEDICINE

## 2023-05-01 PROCEDURE — 99214 PR OFFICE/OUTPT VISIT, EST, LEVL IV, 30-39 MIN: ICD-10-PCS | Mod: S$GLB,,, | Performed by: FAMILY MEDICINE

## 2023-05-01 PROCEDURE — 3008F PR BODY MASS INDEX (BMI) DOCUMENTED: ICD-10-PCS | Mod: CPTII,S$GLB,, | Performed by: FAMILY MEDICINE

## 2023-05-01 PROCEDURE — 3066F NEPHROPATHY DOC TX: CPT | Mod: CPTII,S$GLB,, | Performed by: FAMILY MEDICINE

## 2023-05-01 PROCEDURE — 1159F PR MEDICATION LIST DOCUMENTED IN MEDICAL RECORD: ICD-10-PCS | Mod: CPTII,S$GLB,, | Performed by: FAMILY MEDICINE

## 2023-05-01 PROCEDURE — 99214 OFFICE O/P EST MOD 30 MIN: CPT | Mod: S$GLB,,, | Performed by: FAMILY MEDICINE

## 2023-05-01 PROCEDURE — 3051F HG A1C>EQUAL 7.0%<8.0%: CPT | Mod: CPTII,S$GLB,, | Performed by: FAMILY MEDICINE

## 2023-05-01 PROCEDURE — 3079F PR MOST RECENT DIASTOLIC BLOOD PRESSURE 80-89 MM HG: ICD-10-PCS | Mod: CPTII,S$GLB,, | Performed by: FAMILY MEDICINE

## 2023-05-01 PROCEDURE — 3051F PR MOST RECENT HEMOGLOBIN A1C LEVEL 7.0 - < 8.0%: ICD-10-PCS | Mod: CPTII,S$GLB,, | Performed by: FAMILY MEDICINE

## 2023-05-01 PROCEDURE — 1159F MED LIST DOCD IN RCRD: CPT | Mod: CPTII,S$GLB,, | Performed by: FAMILY MEDICINE

## 2023-05-01 PROCEDURE — 3060F PR POS MICROALBUMINURIA RESULT DOCUMENTED/REVIEW: ICD-10-PCS | Mod: CPTII,S$GLB,, | Performed by: FAMILY MEDICINE

## 2023-05-01 PROCEDURE — 3066F PR DOCUMENTATION OF TREATMENT FOR NEPHROPATHY: ICD-10-PCS | Mod: CPTII,S$GLB,, | Performed by: FAMILY MEDICINE

## 2023-05-01 PROCEDURE — 3060F POS MICROALBUMINURIA REV: CPT | Mod: CPTII,S$GLB,, | Performed by: FAMILY MEDICINE

## 2023-05-01 PROCEDURE — 4010F ACE/ARB THERAPY RXD/TAKEN: CPT | Mod: CPTII,S$GLB,, | Performed by: FAMILY MEDICINE

## 2023-05-01 PROCEDURE — 1160F PR REVIEW ALL MEDS BY PRESCRIBER/CLIN PHARMACIST DOCUMENTED: ICD-10-PCS | Mod: CPTII,S$GLB,, | Performed by: FAMILY MEDICINE

## 2023-05-01 PROCEDURE — 3008F BODY MASS INDEX DOCD: CPT | Mod: CPTII,S$GLB,, | Performed by: FAMILY MEDICINE

## 2023-05-01 PROCEDURE — 4010F PR ACE/ARB THEARPY RXD/TAKEN: ICD-10-PCS | Mod: CPTII,S$GLB,, | Performed by: FAMILY MEDICINE

## 2023-05-01 PROCEDURE — 3074F PR MOST RECENT SYSTOLIC BLOOD PRESSURE < 130 MM HG: ICD-10-PCS | Mod: CPTII,S$GLB,, | Performed by: FAMILY MEDICINE

## 2023-05-01 NOTE — PROGRESS NOTES
Subjective:       Patient ID: Nohelia Rodriguez is a 63 y.o. female.    Chief Complaint: Follow-up    HPI  The patient is a 63-year-old who is here today for follow-up.  Since I have seen her last, she has continued to lose weight.  She is eating healthy and exercising regularly.  She currently weighs 183 which is down from 191 in January.  She has been making consistent progress with weight loss and used to weigh 240 lb.  (Of note, she was in a size 18 and is now in a size 12.)  She is motivated to continue to be physically active eat healthy and achieve an ideal body weight.      Today we discussed the followin) diabetes.  She is currently taking Glucophage and Ozempic consistently.  Her fasting sugars are no higher than 134.  Her recent A1c was 7.2 down from prior value of 8.1.  She is following with the diabetic specialist  2) hypertension.  At last visit, we added lisinopril to the Norvasc and the Coreg.  She is doing well with all 3 of these medications.  Today blood pressure is 122/78  3) hyperlipidemia.  She is doing well with the Crestor.  Her recent total cholesterol was 111 and her LDL was 52.        Review of Systems   Constitutional:  Negative for appetite change, chills, diaphoresis, fatigue, fever and unexpected weight change.   HENT:  Negative for congestion, ear pain, postnasal drip, rhinorrhea, sinus pressure, sneezing, sore throat and trouble swallowing.    Eyes:  Negative for pain, discharge and visual disturbance.   Respiratory:  Negative for cough, chest tightness, shortness of breath and wheezing.    Cardiovascular:  Negative for chest pain, palpitations and leg swelling.   Gastrointestinal:  Negative for abdominal distention, abdominal pain, blood in stool, constipation, diarrhea, nausea and vomiting.   Skin:  Negative for rash.       Objective:      Physical Exam  Constitutional:       General: She is not in acute distress.     Appearance: Normal appearance. She is well-developed.   HENT:       Head: Normocephalic and atraumatic.      Right Ear: Hearing, tympanic membrane, ear canal and external ear normal.      Left Ear: Hearing, tympanic membrane, ear canal and external ear normal.      Nose: Nose normal.      Mouth/Throat:      Mouth: No oral lesions.      Pharynx: No oropharyngeal exudate or posterior oropharyngeal erythema.   Eyes:      General: Lids are normal. No scleral icterus.     Extraocular Movements: Extraocular movements intact.      Conjunctiva/sclera: Conjunctivae normal.      Pupils: Pupils are equal, round, and reactive to light.   Neck:      Thyroid: No thyroid mass or thyromegaly.      Vascular: No carotid bruit.   Cardiovascular:      Rate and Rhythm: Normal rate and regular rhythm. No extrasystoles are present.     Chest Wall: PMI is not displaced.      Heart sounds: Normal heart sounds. No murmur heard.    No gallop.   Pulmonary:      Effort: Pulmonary effort is normal. No accessory muscle usage or respiratory distress.      Breath sounds: Normal breath sounds.   Abdominal:      General: Bowel sounds are normal. There is no abdominal bruit.      Palpations: Abdomen is soft.      Tenderness: There is no abdominal tenderness. There is no rebound.   Musculoskeletal:      Cervical back: Normal range of motion and neck supple.   Lymphadenopathy:      Head:      Right side of head: No submental or submandibular adenopathy.      Left side of head: No submental or submandibular adenopathy.      Cervical:      Right cervical: No superficial, deep or posterior cervical adenopathy.     Left cervical: No superficial, deep or posterior cervical adenopathy.      Upper Body:      Right upper body: No supraclavicular adenopathy.      Left upper body: No supraclavicular adenopathy.   Skin:     General: Skin is warm and dry.   Neurological:      Mental Status: She is alert and oriented to person, place, and time.     Blood pressure 128/86, pulse 86, temperature 98.6 °F (37 °C), resp. rate 20, height  "5' 9" (1.753 m), weight 83.4 kg (183 lb 15.6 oz), SpO2 98 %.Body mass index is 27.17 kg/m².              A/P:  1) diabetes.  Well controlled.  Continue with Glucophage, Ozempic and lifestyle interventions.  She will be having another A1c and seeing the endocrinology team in July.    2) hypertension.  Well controlled.  Continue with lisinopril, Norvasc and Coreg  3)  Hyperlipidemia.  Continue with Crestor.  We will recheck labs in 1 year          As long as she does well, I will see her back in 6 months with an A1c prior to that visit.    "

## 2023-07-17 ENCOUNTER — LAB VISIT (OUTPATIENT)
Dept: LAB | Facility: HOSPITAL | Age: 63
End: 2023-07-17
Attending: NURSE PRACTITIONER
Payer: COMMERCIAL

## 2023-07-17 DIAGNOSIS — E11.3513 TYPE 2 DIABETES MELLITUS WITH BOTH EYES AFFECTED BY PROLIFERATIVE RETINOPATHY AND MACULAR EDEMA, WITH LONG-TERM CURRENT USE OF INSULIN: ICD-10-CM

## 2023-07-17 DIAGNOSIS — Z79.4 TYPE 2 DIABETES MELLITUS WITH BOTH EYES AFFECTED BY PROLIFERATIVE RETINOPATHY AND MACULAR EDEMA, WITH LONG-TERM CURRENT USE OF INSULIN: ICD-10-CM

## 2023-07-17 LAB
ALBUMIN SERPL BCP-MCNC: 3.9 G/DL (ref 3.5–5.2)
ALP SERPL-CCNC: 34 U/L (ref 55–135)
ALT SERPL W/O P-5'-P-CCNC: 13 U/L (ref 10–44)
ANION GAP SERPL CALC-SCNC: 10 MMOL/L (ref 8–16)
AST SERPL-CCNC: 17 U/L (ref 10–40)
BILIRUB SERPL-MCNC: 0.3 MG/DL (ref 0.1–1)
BUN SERPL-MCNC: 19 MG/DL (ref 8–23)
CALCIUM SERPL-MCNC: 10 MG/DL (ref 8.7–10.5)
CHLORIDE SERPL-SCNC: 105 MMOL/L (ref 95–110)
CO2 SERPL-SCNC: 25 MMOL/L (ref 23–29)
CREAT SERPL-MCNC: 1 MG/DL (ref 0.5–1.4)
EST. GFR  (NO RACE VARIABLE): >60 ML/MIN/1.73 M^2
ESTIMATED AVG GLUCOSE: 169 MG/DL (ref 68–131)
GLUCOSE SERPL-MCNC: 223 MG/DL (ref 70–110)
HBA1C MFR BLD: 7.5 % (ref 4–5.6)
POTASSIUM SERPL-SCNC: 4.3 MMOL/L (ref 3.5–5.1)
PROT SERPL-MCNC: 7.1 G/DL (ref 6–8.4)
SODIUM SERPL-SCNC: 140 MMOL/L (ref 136–145)

## 2023-07-17 PROCEDURE — 36415 COLL VENOUS BLD VENIPUNCTURE: CPT | Mod: PO | Performed by: NURSE PRACTITIONER

## 2023-07-17 PROCEDURE — 83036 HEMOGLOBIN GLYCOSYLATED A1C: CPT | Performed by: NURSE PRACTITIONER

## 2023-07-17 PROCEDURE — 80053 COMPREHEN METABOLIC PANEL: CPT | Performed by: NURSE PRACTITIONER

## 2023-07-20 DIAGNOSIS — Z79.4 TYPE 2 DIABETES MELLITUS WITH BOTH EYES AFFECTED BY PROLIFERATIVE RETINOPATHY AND MACULAR EDEMA, WITH LONG-TERM CURRENT USE OF INSULIN: ICD-10-CM

## 2023-07-20 DIAGNOSIS — E11.3513 TYPE 2 DIABETES MELLITUS WITH BOTH EYES AFFECTED BY PROLIFERATIVE RETINOPATHY AND MACULAR EDEMA, WITH LONG-TERM CURRENT USE OF INSULIN: ICD-10-CM

## 2023-07-20 RX ORDER — SEMAGLUTIDE 1.34 MG/ML
INJECTION, SOLUTION SUBCUTANEOUS
Qty: 3 ML | Refills: 5 | Status: SHIPPED | OUTPATIENT
Start: 2023-07-20 | End: 2023-07-24

## 2023-07-24 ENCOUNTER — TELEPHONE (OUTPATIENT)
Dept: ENDOCRINOLOGY | Facility: CLINIC | Age: 63
End: 2023-07-24

## 2023-07-24 ENCOUNTER — OFFICE VISIT (OUTPATIENT)
Dept: ENDOCRINOLOGY | Facility: CLINIC | Age: 63
End: 2023-07-24
Payer: COMMERCIAL

## 2023-07-24 VITALS
WEIGHT: 184.31 LBS | SYSTOLIC BLOOD PRESSURE: 122 MMHG | DIASTOLIC BLOOD PRESSURE: 70 MMHG | BODY MASS INDEX: 27.3 KG/M2 | HEART RATE: 91 BPM | HEIGHT: 69 IN

## 2023-07-24 DIAGNOSIS — I10 PRIMARY HYPERTENSION: ICD-10-CM

## 2023-07-24 DIAGNOSIS — Z79.4 TYPE 2 DIABETES MELLITUS WITH BOTH EYES AFFECTED BY PROLIFERATIVE RETINOPATHY AND MACULAR EDEMA, WITH LONG-TERM CURRENT USE OF INSULIN: Primary | ICD-10-CM

## 2023-07-24 DIAGNOSIS — E11.29 TYPE 2 DIABETES MELLITUS WITH MICROALBUMINURIA, WITHOUT LONG-TERM CURRENT USE OF INSULIN: ICD-10-CM

## 2023-07-24 DIAGNOSIS — R80.9 TYPE 2 DIABETES MELLITUS WITH MICROALBUMINURIA, WITHOUT LONG-TERM CURRENT USE OF INSULIN: ICD-10-CM

## 2023-07-24 DIAGNOSIS — E11.3513 TYPE 2 DIABETES MELLITUS WITH BOTH EYES AFFECTED BY PROLIFERATIVE RETINOPATHY AND MACULAR EDEMA, WITH LONG-TERM CURRENT USE OF INSULIN: Primary | ICD-10-CM

## 2023-07-24 PROCEDURE — 1160F PR REVIEW ALL MEDS BY PRESCRIBER/CLIN PHARMACIST DOCUMENTED: ICD-10-PCS | Mod: CPTII,S$GLB,, | Performed by: NURSE PRACTITIONER

## 2023-07-24 PROCEDURE — 3008F PR BODY MASS INDEX (BMI) DOCUMENTED: ICD-10-PCS | Mod: CPTII,S$GLB,, | Performed by: NURSE PRACTITIONER

## 2023-07-24 PROCEDURE — 4010F PR ACE/ARB THEARPY RXD/TAKEN: ICD-10-PCS | Mod: CPTII,S$GLB,, | Performed by: NURSE PRACTITIONER

## 2023-07-24 PROCEDURE — 3060F POS MICROALBUMINURIA REV: CPT | Mod: CPTII,S$GLB,, | Performed by: NURSE PRACTITIONER

## 2023-07-24 PROCEDURE — 3074F SYST BP LT 130 MM HG: CPT | Mod: CPTII,S$GLB,, | Performed by: NURSE PRACTITIONER

## 2023-07-24 PROCEDURE — 3078F PR MOST RECENT DIASTOLIC BLOOD PRESSURE < 80 MM HG: ICD-10-PCS | Mod: CPTII,S$GLB,, | Performed by: NURSE PRACTITIONER

## 2023-07-24 PROCEDURE — 3051F HG A1C>EQUAL 7.0%<8.0%: CPT | Mod: CPTII,S$GLB,, | Performed by: NURSE PRACTITIONER

## 2023-07-24 PROCEDURE — 1159F MED LIST DOCD IN RCRD: CPT | Mod: CPTII,S$GLB,, | Performed by: NURSE PRACTITIONER

## 2023-07-24 PROCEDURE — 3060F PR POS MICROALBUMINURIA RESULT DOCUMENTED/REVIEW: ICD-10-PCS | Mod: CPTII,S$GLB,, | Performed by: NURSE PRACTITIONER

## 2023-07-24 PROCEDURE — 99999 PR PBB SHADOW E&M-EST. PATIENT-LVL IV: ICD-10-PCS | Mod: PBBFAC,,, | Performed by: NURSE PRACTITIONER

## 2023-07-24 PROCEDURE — 3074F PR MOST RECENT SYSTOLIC BLOOD PRESSURE < 130 MM HG: ICD-10-PCS | Mod: CPTII,S$GLB,, | Performed by: NURSE PRACTITIONER

## 2023-07-24 PROCEDURE — 3008F BODY MASS INDEX DOCD: CPT | Mod: CPTII,S$GLB,, | Performed by: NURSE PRACTITIONER

## 2023-07-24 PROCEDURE — 1160F RVW MEDS BY RX/DR IN RCRD: CPT | Mod: CPTII,S$GLB,, | Performed by: NURSE PRACTITIONER

## 2023-07-24 PROCEDURE — 3078F DIAST BP <80 MM HG: CPT | Mod: CPTII,S$GLB,, | Performed by: NURSE PRACTITIONER

## 2023-07-24 PROCEDURE — 99214 PR OFFICE/OUTPT VISIT, EST, LEVL IV, 30-39 MIN: ICD-10-PCS | Mod: S$GLB,,, | Performed by: NURSE PRACTITIONER

## 2023-07-24 PROCEDURE — 3051F PR MOST RECENT HEMOGLOBIN A1C LEVEL 7.0 - < 8.0%: ICD-10-PCS | Mod: CPTII,S$GLB,, | Performed by: NURSE PRACTITIONER

## 2023-07-24 PROCEDURE — 3066F NEPHROPATHY DOC TX: CPT | Mod: CPTII,S$GLB,, | Performed by: NURSE PRACTITIONER

## 2023-07-24 PROCEDURE — 4010F ACE/ARB THERAPY RXD/TAKEN: CPT | Mod: CPTII,S$GLB,, | Performed by: NURSE PRACTITIONER

## 2023-07-24 PROCEDURE — 3066F PR DOCUMENTATION OF TREATMENT FOR NEPHROPATHY: ICD-10-PCS | Mod: CPTII,S$GLB,, | Performed by: NURSE PRACTITIONER

## 2023-07-24 PROCEDURE — 99999 PR PBB SHADOW E&M-EST. PATIENT-LVL IV: CPT | Mod: PBBFAC,,, | Performed by: NURSE PRACTITIONER

## 2023-07-24 PROCEDURE — 1159F PR MEDICATION LIST DOCUMENTED IN MEDICAL RECORD: ICD-10-PCS | Mod: CPTII,S$GLB,, | Performed by: NURSE PRACTITIONER

## 2023-07-24 PROCEDURE — 99214 OFFICE O/P EST MOD 30 MIN: CPT | Mod: S$GLB,,, | Performed by: NURSE PRACTITIONER

## 2023-07-24 RX ORDER — SEMAGLUTIDE 2.68 MG/ML
2 INJECTION, SOLUTION SUBCUTANEOUS
Qty: 3 ML | Refills: 11 | Status: SHIPPED | OUTPATIENT
Start: 2023-07-24 | End: 2023-11-09

## 2023-07-24 NOTE — PROGRESS NOTES
"CC: Ms. Nohelia Rodriguez arrives today for management of Type 2 DM and review of chronic medical conditions, as listed in the Visit Diagnosis section of this encounter.       HPI: Ms. Nohelia Rodriguez was diagnosed with Type 2 DM in her 40s. She did have GDM ~ 10 years prior to Type 2 DM diagnosis. She was diagnosed based on lab work. Initial treatment consisted of orals. + FH of DM in mother, 3 sisters, 2 brothers. Denies hospitalizations due to DM.     Patient was last seen by me in February.    BG readings are checked 1x/day.  Reports the following:   Fastin-130    Hypoglycemia: No    Missing Insulin/PO medication doses: No    Exercise: Yes - goes to gym 3 days/week.     Dietary Habits: Eats 3 light meals/day.  Avoids sugary beverages.     Last DM education appointment: 3/14/2022      CURRENT DIABETIC MEDS: metformin 1000 mg BID, Ozempic 1 mg weekly  Vial or pen: pen  Glucometer type:     Previous DM treatments:  Glyburide  Lantus  Trulicity - availability     Last Eye Exam: 2023, + DR. Jessica Herrera. Has appt next week.   Last Podiatry Exam: 2021    REVIEW OF SYSTEMS  Constitutional: no c/o fatigue, weakness. + 5# weight loss since last visit.   Eyes: denies visual disturbances.  Cardiac: no palpitations or chest pain.  Respiratory: no cough or dyspnea.  GI: no c/o abdominal pain or nausea. Denies h/o pancreatitis. Reports some mild constipation. Taking Miralax with relief.   Skin: no lesions or rashes.  Neuro: no numbness, tingling, or parasthesias.  Endocrine: denies polyphagia, polydipsia, polyuria      Personally reviewed Past Medical, Surgical, Social History.    Vital Signs  /70   Pulse 91   Ht 5' 9" (1.753 m)   Wt 83.6 kg (184 lb 4.9 oz)   BMI 27.22 kg/m²     Personally reviewed the below labs:    Hemoglobin A1C   Date Value Ref Range Status   2023 7.5 (H) 4.0 - 5.6 % Final     Comment:     ADA Screening Guidelines:  5.7-6.4%  Consistent with prediabetes  >or=6.5%  Consistent with " diabetes    High levels of fetal hemoglobin interfere with the HbA1C  assay. Heterozygous hemoglobin variants (HbS, HgC, etc)do  not significantly interfere with this assay.   However, presence of multiple variants may affect accuracy.     04/17/2023 7.2 (H) 4.0 - 5.6 % Final     Comment:     ADA Screening Guidelines:  5.7-6.4%  Consistent with prediabetes  >or=6.5%  Consistent with diabetes    High levels of fetal hemoglobin interfere with the HbA1C  assay. Heterozygous hemoglobin variants (HbS, HgC, etc)do  not significantly interfere with this assay.   However, presence of multiple variants may affect accuracy.     02/13/2023 8.1 (H) 4.0 - 5.6 % Final     Comment:     ADA Screening Guidelines:  5.7-6.4%  Consistent with prediabetes  >or=6.5%  Consistent with diabetes    High levels of fetal hemoglobin interfere with the HbA1C  assay. Heterozygous hemoglobin variants (HbS, HgC, etc)do  not significantly interfere with this assay.   However, presence of multiple variants may affect accuracy.         Chemistry        Component Value Date/Time     07/17/2023 0947    K 4.3 07/17/2023 0947     07/17/2023 0947    CO2 25 07/17/2023 0947    BUN 19 07/17/2023 0947    CREATININE 1.0 07/17/2023 0947     (H) 07/17/2023 0947        Component Value Date/Time    CALCIUM 10.0 07/17/2023 0947    ALKPHOS 34 (L) 07/17/2023 0947    AST 17 07/17/2023 0947    ALT 13 07/17/2023 0947    BILITOT 0.3 07/17/2023 0947    ESTGFRAFRICA >60.0 06/27/2022 0954    EGFRNONAA >60.0 06/27/2022 0954          Lab Results   Component Value Date    CHOL 111 (L) 02/13/2023    CHOL 127 03/07/2022    CHOL 136 04/01/2021     Lab Results   Component Value Date    HDL 46 02/13/2023    HDL 60 03/07/2022    HDL 49 04/01/2021     Lab Results   Component Value Date    LDLCALC 52.2 (L) 02/13/2023    LDLCALC 52.8 (L) 03/07/2022    LDLCALC 75.4 04/01/2021     Lab Results   Component Value Date    TRIG 64 02/13/2023    TRIG 71 03/07/2022    TRIG 58  04/01/2021     Lab Results   Component Value Date    CHOLHDL 41.4 02/13/2023    CHOLHDL 47.2 03/07/2022    CHOLHDL 36.0 04/01/2021       Lab Results   Component Value Date    MICALBCREAT 76.0 (H) 02/13/2023     Lab Results   Component Value Date    TSH 0.566 02/13/2023       CrCl cannot be calculated (Unknown ideal weight.).    Vit D, 25-Hydroxy   Date Value Ref Range Status   05/25/2019 36 30 - 96 ng/mL Final     Comment:     Vitamin D deficiency.........<10 ng/mL                              Vitamin D insufficiency......10-29 ng/mL       Vitamin D sufficiency........> or equal to 30 ng/mL  Vitamin D toxicity............>100 ng/mL           PHYSICAL EXAMINATION  Constitutional: Appears well, no distress  Respiratory: CTA, even and unlabored.  Cardiovascular: RRR, no murmurs, no carotid bruits.    GI: no hernia noted  Skin: warm and dry; no visible wounds  Neuro: oriented to person, place, time  Feet: appropriate footwear.  Protective Sensation (w/ 10 gram monofilament):  Right: Intact  Left: Intact    Visual Inspection:  Onychomycosis -  Bilateral    Pedal Pulses:   Right: Present  Left: Present    Posterior Tibialis Pulses:   Right:Present  Left: Present         Goals        HEMOGLOBIN A1C < 7                 Assessment/Plan  1. Type 2 diabetes mellitus with both eyes affected by proliferative retinopathy and macular edema, with long-term current use of insulin  -- Uncontrolled. I anticipate prandial excursions are the cause of elevated A1c. Suggested changing Ozempic to Mounjaro or increasing Ozempic. She opts for the latter.   -- increase Ozempic to 2 mg weekly.   -- continue metformin  -- BG monitoring 1x/day. Advised her to alternate times.     -- Discussed diagnosis of DM, A1c goals, progression of disease, long term complications and tx options.  -- takes statin, ace-i   2. Type 2 diabetes mellitus with microalbuminuria, without long-term current use of insulin -- uncontrolled  -- continue lisinopril   3.  Primary hypertension  -- controlled  -- continue current meds       FOLLOW UP  Follow up in about 4 months (around 11/24/2023).   Patient instructed to bring BG logs to each follow up   Patient encouraged to call for any BG/medication issues, concerns, or questions.    Orders Placed This Encounter   Procedures    Hemoglobin A1C

## 2023-07-31 ENCOUNTER — OFFICE VISIT (OUTPATIENT)
Dept: OPHTHALMOLOGY | Facility: CLINIC | Age: 63
End: 2023-07-31
Payer: COMMERCIAL

## 2023-07-31 ENCOUNTER — LAB VISIT (OUTPATIENT)
Dept: LAB | Facility: HOSPITAL | Age: 63
End: 2023-07-31
Payer: COMMERCIAL

## 2023-07-31 DIAGNOSIS — D64.9 ANEMIA, NORMOCYTIC NORMOCHROMIC: ICD-10-CM

## 2023-07-31 DIAGNOSIS — E11.3513 CONTROLLED TYPE 2 DIABETES MELLITUS WITH BOTH EYES AFFECTED BY PROLIFERATIVE RETINOPATHY AND MACULAR EDEMA, WITH LONG-TERM CURRENT USE OF INSULIN: ICD-10-CM

## 2023-07-31 DIAGNOSIS — D72.819 LEUKOPENIA, UNSPECIFIED TYPE: ICD-10-CM

## 2023-07-31 DIAGNOSIS — Z79.4 CONTROLLED TYPE 2 DIABETES MELLITUS WITH BOTH EYES AFFECTED BY PROLIFERATIVE RETINOPATHY AND MACULAR EDEMA, WITH LONG-TERM CURRENT USE OF INSULIN: ICD-10-CM

## 2023-07-31 LAB
ALBUMIN SERPL BCP-MCNC: 4.1 G/DL (ref 3.5–5.2)
ALP SERPL-CCNC: 34 U/L (ref 55–135)
ALT SERPL W/O P-5'-P-CCNC: 13 U/L (ref 10–44)
ANION GAP SERPL CALC-SCNC: 9 MMOL/L (ref 8–16)
ANISOCYTOSIS BLD QL SMEAR: SLIGHT
AST SERPL-CCNC: 16 U/L (ref 10–40)
BASOPHILS # BLD AUTO: 0.03 K/UL (ref 0–0.2)
BASOPHILS NFR BLD: 1.2 % (ref 0–1.9)
BILIRUB SERPL-MCNC: 0.4 MG/DL (ref 0.1–1)
BUN SERPL-MCNC: 17 MG/DL (ref 8–23)
BURR CELLS BLD QL SMEAR: ABNORMAL
CALCIUM SERPL-MCNC: 10.1 MG/DL (ref 8.7–10.5)
CHLORIDE SERPL-SCNC: 105 MMOL/L (ref 95–110)
CO2 SERPL-SCNC: 26 MMOL/L (ref 23–29)
CREAT SERPL-MCNC: 1 MG/DL (ref 0.5–1.4)
DIFFERENTIAL METHOD: ABNORMAL
EOSINOPHIL # BLD AUTO: 0.1 K/UL (ref 0–0.5)
EOSINOPHIL NFR BLD: 3.2 % (ref 0–8)
ERYTHROCYTE [DISTWIDTH] IN BLOOD BY AUTOMATED COUNT: 14.4 % (ref 11.5–14.5)
ERYTHROCYTE [SEDIMENTATION RATE] IN BLOOD BY PHOTOMETRIC METHOD: 13 MM/HR (ref 0–36)
EST. GFR  (NO RACE VARIABLE): >60 ML/MIN/1.73 M^2
GLUCOSE SERPL-MCNC: 118 MG/DL (ref 70–110)
HCT VFR BLD AUTO: 31 % (ref 37–48.5)
HGB BLD-MCNC: 10.2 G/DL (ref 12–16)
IMM GRANULOCYTES # BLD AUTO: 0 K/UL (ref 0–0.04)
IMM GRANULOCYTES NFR BLD AUTO: 0 % (ref 0–0.5)
LDH SERPL L TO P-CCNC: 123 U/L (ref 110–260)
LYMPHOCYTES # BLD AUTO: 1.3 K/UL (ref 1–4.8)
LYMPHOCYTES NFR BLD: 51.8 % (ref 18–48)
MCH RBC QN AUTO: 28.7 PG (ref 27–31)
MCHC RBC AUTO-ENTMCNC: 32.9 G/DL (ref 32–36)
MCV RBC AUTO: 87 FL (ref 82–98)
MONOCYTES # BLD AUTO: 0.2 K/UL (ref 0.3–1)
MONOCYTES NFR BLD: 9.2 % (ref 4–15)
NEUTROPHILS # BLD AUTO: 0.9 K/UL (ref 1.8–7.7)
NEUTROPHILS NFR BLD: 34.6 % (ref 38–73)
NRBC BLD-RTO: 0 /100 WBC
OVALOCYTES BLD QL SMEAR: ABNORMAL
PLATELET # BLD AUTO: 212 K/UL (ref 150–450)
PLATELET BLD QL SMEAR: ABNORMAL
PMV BLD AUTO: 9.8 FL (ref 9.2–12.9)
POIKILOCYTOSIS BLD QL SMEAR: SLIGHT
POTASSIUM SERPL-SCNC: 4.5 MMOL/L (ref 3.5–5.1)
PROT SERPL-MCNC: 7.2 G/DL (ref 6–8.4)
RBC # BLD AUTO: 3.56 M/UL (ref 4–5.4)
SODIUM SERPL-SCNC: 140 MMOL/L (ref 136–145)
WBC # BLD AUTO: 2.51 K/UL (ref 3.9–12.7)

## 2023-07-31 PROCEDURE — 4010F ACE/ARB THERAPY RXD/TAKEN: CPT | Mod: CPTII,S$GLB,, | Performed by: OPHTHALMOLOGY

## 2023-07-31 PROCEDURE — 3060F POS MICROALBUMINURIA REV: CPT | Mod: CPTII,S$GLB,, | Performed by: OPHTHALMOLOGY

## 2023-07-31 PROCEDURE — 85652 RBC SED RATE AUTOMATED: CPT | Performed by: INTERNAL MEDICINE

## 2023-07-31 PROCEDURE — 92201 PR OPHTHALMOSCOPY, EXT, W/RET DRAW/SCLERAL DEPR, I&R, UNI/BI: ICD-10-PCS | Mod: S$GLB,,, | Performed by: OPHTHALMOLOGY

## 2023-07-31 PROCEDURE — 3066F PR DOCUMENTATION OF TREATMENT FOR NEPHROPATHY: ICD-10-PCS | Mod: CPTII,S$GLB,, | Performed by: OPHTHALMOLOGY

## 2023-07-31 PROCEDURE — 92014 COMPRE OPH EXAM EST PT 1/>: CPT | Mod: S$GLB,,, | Performed by: OPHTHALMOLOGY

## 2023-07-31 PROCEDURE — 1160F PR REVIEW ALL MEDS BY PRESCRIBER/CLIN PHARMACIST DOCUMENTED: ICD-10-PCS | Mod: CPTII,S$GLB,, | Performed by: OPHTHALMOLOGY

## 2023-07-31 PROCEDURE — 3066F NEPHROPATHY DOC TX: CPT | Mod: CPTII,S$GLB,, | Performed by: OPHTHALMOLOGY

## 2023-07-31 PROCEDURE — 92201 OPSCPY EXTND RTA DRAW UNI/BI: CPT | Mod: S$GLB,,, | Performed by: OPHTHALMOLOGY

## 2023-07-31 PROCEDURE — 3060F PR POS MICROALBUMINURIA RESULT DOCUMENTED/REVIEW: ICD-10-PCS | Mod: CPTII,S$GLB,, | Performed by: OPHTHALMOLOGY

## 2023-07-31 PROCEDURE — 80053 COMPREHEN METABOLIC PANEL: CPT | Mod: PN | Performed by: INTERNAL MEDICINE

## 2023-07-31 PROCEDURE — 92014 PR EYE EXAM, EST PATIENT,COMPREHESV: ICD-10-PCS | Mod: S$GLB,,, | Performed by: OPHTHALMOLOGY

## 2023-07-31 PROCEDURE — 1160F RVW MEDS BY RX/DR IN RCRD: CPT | Mod: CPTII,S$GLB,, | Performed by: OPHTHALMOLOGY

## 2023-07-31 PROCEDURE — 1159F MED LIST DOCD IN RCRD: CPT | Mod: CPTII,S$GLB,, | Performed by: OPHTHALMOLOGY

## 2023-07-31 PROCEDURE — 99999 PR PBB SHADOW E&M-EST. PATIENT-LVL III: CPT | Mod: PBBFAC,,, | Performed by: OPHTHALMOLOGY

## 2023-07-31 PROCEDURE — 3051F PR MOST RECENT HEMOGLOBIN A1C LEVEL 7.0 - < 8.0%: ICD-10-PCS | Mod: CPTII,S$GLB,, | Performed by: OPHTHALMOLOGY

## 2023-07-31 PROCEDURE — 83615 LACTATE (LD) (LDH) ENZYME: CPT | Mod: PN | Performed by: INTERNAL MEDICINE

## 2023-07-31 PROCEDURE — 92134 CPTRZ OPH DX IMG PST SGM RTA: CPT | Mod: S$GLB,,, | Performed by: OPHTHALMOLOGY

## 2023-07-31 PROCEDURE — 4010F PR ACE/ARB THEARPY RXD/TAKEN: ICD-10-PCS | Mod: CPTII,S$GLB,, | Performed by: OPHTHALMOLOGY

## 2023-07-31 PROCEDURE — 3051F HG A1C>EQUAL 7.0%<8.0%: CPT | Mod: CPTII,S$GLB,, | Performed by: OPHTHALMOLOGY

## 2023-07-31 PROCEDURE — 99999 PR PBB SHADOW E&M-EST. PATIENT-LVL III: ICD-10-PCS | Mod: PBBFAC,,, | Performed by: OPHTHALMOLOGY

## 2023-07-31 PROCEDURE — 36415 COLL VENOUS BLD VENIPUNCTURE: CPT | Mod: PN | Performed by: INTERNAL MEDICINE

## 2023-07-31 PROCEDURE — 85025 COMPLETE CBC W/AUTO DIFF WBC: CPT | Mod: PN | Performed by: INTERNAL MEDICINE

## 2023-07-31 PROCEDURE — 1159F PR MEDICATION LIST DOCUMENTED IN MEDICAL RECORD: ICD-10-PCS | Mod: CPTII,S$GLB,, | Performed by: OPHTHALMOLOGY

## 2023-07-31 PROCEDURE — 92134 POSTERIOR SEGMENT OCT RETINA (OCULAR COHERENCE TOMOGRAPHY)-BOTH EYES: ICD-10-PCS | Mod: S$GLB,,, | Performed by: OPHTHALMOLOGY

## 2023-07-31 NOTE — PROGRESS NOTES
HPI    6 mo OCT/DFE  DLS: 1/30/2023 Dr. Herrera    Pt states that she saw Dr. Xavier about 2 weeks ago and he stated her   vision has been stable. Pt denies eye pain, flashes, and floaters.    GTTS:  Refresh PRN OU  Latanoprost QHS OU        OCT - OD trace ME OD  OS - temporal ME a/w EMILE - s/p focal, resolved    Prior FA - late macular leakage OS  NO NV OU      A/P    1. ?PDR OD  S/p light PRP OD with PSO'S  Mod NPDR OS    2. DME   OD   1/23 - trace ME with good Va    Monitor for now    OS -   A/w EMILE  S/p multiple injections with PSO'S  S/p Avastin OS x 3 with me  S/p Focal OS 5/18    Stable observe      3. HTN Ret OU  BS/BP/chol control    4. NS OU  No VS        12 months OCT

## 2023-08-07 ENCOUNTER — OFFICE VISIT (OUTPATIENT)
Dept: HEMATOLOGY/ONCOLOGY | Facility: CLINIC | Age: 63
End: 2023-08-07
Payer: COMMERCIAL

## 2023-08-07 VITALS
RESPIRATION RATE: 16 BRPM | BODY MASS INDEX: 27.07 KG/M2 | OXYGEN SATURATION: 97 % | HEIGHT: 69 IN | WEIGHT: 182.75 LBS | HEART RATE: 87 BPM | TEMPERATURE: 97 F | SYSTOLIC BLOOD PRESSURE: 128 MMHG | DIASTOLIC BLOOD PRESSURE: 72 MMHG

## 2023-08-07 DIAGNOSIS — D64.9 ANEMIA, NORMOCYTIC NORMOCHROMIC: Primary | ICD-10-CM

## 2023-08-07 DIAGNOSIS — E11.3513 CONTROLLED TYPE 2 DIABETES MELLITUS WITH BOTH EYES AFFECTED BY PROLIFERATIVE RETINOPATHY AND MACULAR EDEMA, WITH LONG-TERM CURRENT USE OF INSULIN: ICD-10-CM

## 2023-08-07 DIAGNOSIS — Z79.4 CONTROLLED TYPE 2 DIABETES MELLITUS WITH BOTH EYES AFFECTED BY PROLIFERATIVE RETINOPATHY AND MACULAR EDEMA, WITH LONG-TERM CURRENT USE OF INSULIN: ICD-10-CM

## 2023-08-07 DIAGNOSIS — D72.819 LEUKOPENIA, UNSPECIFIED TYPE: ICD-10-CM

## 2023-08-07 DIAGNOSIS — I10 PRIMARY HYPERTENSION: ICD-10-CM

## 2023-08-07 PROCEDURE — 3008F PR BODY MASS INDEX (BMI) DOCUMENTED: ICD-10-PCS | Mod: CPTII,S$GLB,, | Performed by: INTERNAL MEDICINE

## 2023-08-07 PROCEDURE — 3060F PR POS MICROALBUMINURIA RESULT DOCUMENTED/REVIEW: ICD-10-PCS | Mod: CPTII,S$GLB,, | Performed by: INTERNAL MEDICINE

## 2023-08-07 PROCEDURE — 3051F PR MOST RECENT HEMOGLOBIN A1C LEVEL 7.0 - < 8.0%: ICD-10-PCS | Mod: CPTII,S$GLB,, | Performed by: INTERNAL MEDICINE

## 2023-08-07 PROCEDURE — 3078F PR MOST RECENT DIASTOLIC BLOOD PRESSURE < 80 MM HG: ICD-10-PCS | Mod: CPTII,S$GLB,, | Performed by: INTERNAL MEDICINE

## 2023-08-07 PROCEDURE — 3051F HG A1C>EQUAL 7.0%<8.0%: CPT | Mod: CPTII,S$GLB,, | Performed by: INTERNAL MEDICINE

## 2023-08-07 PROCEDURE — 4010F PR ACE/ARB THEARPY RXD/TAKEN: ICD-10-PCS | Mod: CPTII,S$GLB,, | Performed by: INTERNAL MEDICINE

## 2023-08-07 PROCEDURE — 3066F NEPHROPATHY DOC TX: CPT | Mod: CPTII,S$GLB,, | Performed by: INTERNAL MEDICINE

## 2023-08-07 PROCEDURE — 3008F BODY MASS INDEX DOCD: CPT | Mod: CPTII,S$GLB,, | Performed by: INTERNAL MEDICINE

## 2023-08-07 PROCEDURE — 4010F ACE/ARB THERAPY RXD/TAKEN: CPT | Mod: CPTII,S$GLB,, | Performed by: INTERNAL MEDICINE

## 2023-08-07 PROCEDURE — 1160F RVW MEDS BY RX/DR IN RCRD: CPT | Mod: CPTII,S$GLB,, | Performed by: INTERNAL MEDICINE

## 2023-08-07 PROCEDURE — 1160F PR REVIEW ALL MEDS BY PRESCRIBER/CLIN PHARMACIST DOCUMENTED: ICD-10-PCS | Mod: CPTII,S$GLB,, | Performed by: INTERNAL MEDICINE

## 2023-08-07 PROCEDURE — 3060F POS MICROALBUMINURIA REV: CPT | Mod: CPTII,S$GLB,, | Performed by: INTERNAL MEDICINE

## 2023-08-07 PROCEDURE — 3074F PR MOST RECENT SYSTOLIC BLOOD PRESSURE < 130 MM HG: ICD-10-PCS | Mod: CPTII,S$GLB,, | Performed by: INTERNAL MEDICINE

## 2023-08-07 PROCEDURE — 3078F DIAST BP <80 MM HG: CPT | Mod: CPTII,S$GLB,, | Performed by: INTERNAL MEDICINE

## 2023-08-07 PROCEDURE — 99214 OFFICE O/P EST MOD 30 MIN: CPT | Mod: S$GLB,,, | Performed by: INTERNAL MEDICINE

## 2023-08-07 PROCEDURE — 3074F SYST BP LT 130 MM HG: CPT | Mod: CPTII,S$GLB,, | Performed by: INTERNAL MEDICINE

## 2023-08-07 PROCEDURE — 99214 PR OFFICE/OUTPT VISIT, EST, LEVL IV, 30-39 MIN: ICD-10-PCS | Mod: S$GLB,,, | Performed by: INTERNAL MEDICINE

## 2023-08-07 PROCEDURE — 1159F PR MEDICATION LIST DOCUMENTED IN MEDICAL RECORD: ICD-10-PCS | Mod: CPTII,S$GLB,, | Performed by: INTERNAL MEDICINE

## 2023-08-07 PROCEDURE — 3066F PR DOCUMENTATION OF TREATMENT FOR NEPHROPATHY: ICD-10-PCS | Mod: CPTII,S$GLB,, | Performed by: INTERNAL MEDICINE

## 2023-08-07 PROCEDURE — 1159F MED LIST DOCD IN RCRD: CPT | Mod: CPTII,S$GLB,, | Performed by: INTERNAL MEDICINE

## 2023-08-07 PROCEDURE — 99999 PR PBB SHADOW E&M-EST. PATIENT-LVL IV: ICD-10-PCS | Mod: PBBFAC,,, | Performed by: INTERNAL MEDICINE

## 2023-08-07 PROCEDURE — 99999 PR PBB SHADOW E&M-EST. PATIENT-LVL IV: CPT | Mod: PBBFAC,,, | Performed by: INTERNAL MEDICINE

## 2023-08-07 NOTE — PROGRESS NOTES
Subjective:       Name: Nohelia Rodriguez  : 1960  MRN: [id number]    Chief Complaint   Patient presents with    Anemia, normocytic normochromic     4 mo follow up with labs      Patient is in clinic by herself    HPI: Nohelia Rodriguez is a 63 y.o. female presents to discuss the evaluation done to assess Anemia, normocytic normochromic (4 mo follow up with labs)  Patient was seen previously by  and  for leukopenia and anemia.  She was due to proceed with a bone marrow biopsy to complete her workup.    Blood workup done on 2023 showing white count of 2.51 a hemoglobin of 10.2 MCV of 87 and platelet count of 212.  Her differential was normal.  Her CMP ESR LDH were normal.      Last colonoscopy done 3/19 was normal. She does not eat red meat. Last mammogram 3/7/2022 was normal.  The patient denies any fever chills night sweats weight loss melena hematochezia hematemesis hematuria. She denies any recent history of traveling or exposure to sick people.  She is cold  natured and have more chills than night sweats.  She denies any family history of sickle cell disease trait or blood related malignancies.    Oncology History    No history exists.        Past Medical History:   Diagnosis Date    Diabetes mellitus     Diabetic retinopathy     s/p laser treatment    Glaucoma     Hypertension     S/P colonoscopy     3/19 next due 3/29       Past Surgical History:   Procedure Laterality Date    COLONOSCOPY N/A 3/22/2019    Procedure: COLONOSCOPY;  Surgeon: Kishor Membreno MD;  Location: [facility];  Service: Endoscopy;  Laterality: N/A;    diabetic retinopathy laser         Family History   Problem Relation Age of Onset    Diabetes Mother     Cataracts Mother     Hypertension Mother     Diabetes Sister     Hypertension Sister     Hyperlipidemia Sister         a fib    Diabetes Brother     Hypertension Brother     Hydrocephalus Son     Diabetes Brother     Diabetes Sister     Diabetes Sister   "      Social History     Socioeconomic History    Marital status:    Tobacco Use    Smoking status: Never    Smokeless tobacco: Never   Substance and Sexual Activity    Alcohol use: No    Drug use: No    Sexual activity: Not Currently   Social History Narrative    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       Review of patient's allergies indicates:  No Known Allergies    Review of Systems   Constitutional:  Negative for fatigue and fever.   HENT:  Negative for mouth sores, sore throat, trouble swallowing and voice change.    Eyes:  Negative for photophobia and visual disturbance.   Respiratory:  Negative for cough, chest tightness and shortness of breath.    Cardiovascular:  Negative for chest pain.   Gastrointestinal:  Negative for abdominal pain, blood in stool, constipation and diarrhea.   Endocrine: Positive for cold intolerance.   Genitourinary:  Negative for dysuria and hematuria.   Musculoskeletal:  Negative for arthralgias and joint swelling.   Integumentary:  Negative for pallor, rash and wound.   Neurological:  Negative for dizziness, speech difficulty, weakness and light-headedness.   Hematological:  Does not bruise/bleed easily.   Psychiatric/Behavioral:  Negative for sleep disturbance. The patient is not nervous/anxious.             Objective:     Vitals:    08/07/23 1106   BP: 128/72   BP Location: Left arm   Patient Position: Sitting   BP Method: Medium (Manual)   Pulse: 87   Resp: 16   Temp: 97.3 °F (36.3 °C)   TempSrc: Temporal   SpO2: 97%   Weight: 82.9 kg (182 lb 12.2 oz)   Height: 5' 9" (1.753 m)          Physical Exam  Vitals reviewed.   Constitutional:       Appearance: Normal appearance.   HENT:      Head: Normocephalic and atraumatic.   Eyes:      General: No scleral icterus.     Pupils: Pupils are equal, round, and reactive to light.   Cardiovascular:      Rate and Rhythm: Normal rate and regular rhythm.      Pulses: Normal pulses.      Heart sounds: Normal heart " sounds.   Pulmonary:      Effort: Pulmonary effort is normal.      Breath sounds: Normal breath sounds.   Abdominal:      General: Bowel sounds are normal. There is no distension.   Musculoskeletal:         General: No swelling.   Lymphadenopathy:      Cervical: No cervical adenopathy.   Skin:     General: Skin is warm.      Findings: No rash.   Neurological:      General: No focal deficit present.      Mental Status: She is alert and oriented to person, place, and time.      Cranial Nerves: No cranial nerve deficit.      Motor: No weakness.   Psychiatric:         Mood and Affect: Mood normal.         Behavior: Behavior normal.                Current Outpatient Medications on File Prior to Visit   Medication Sig    amLODIPine (NORVASC) 10 MG tablet Take 1 tablet by mouth once daily (Patient taking differently: Take 5 mg by mouth once daily.)    blood sugar diagnostic Strp To check BG QD times daily, to use with insurance preferred meter    blood-glucose meter kit As directed    carvediloL (COREG) 25 MG tablet TAKE 1 TABLET BY MOUTH TWICE DAILY WITH MEALS    lancets Misc To check BG QD times daily, to use with insurance preferred meter    latanoprost 0.005 % ophthalmic solution Place 1 drop into both eyes every evening.     lisinopriL (PRINIVIL,ZESTRIL) 5 MG tablet Take 1 tablet by mouth once daily    metFORMIN (GLUCOPHAGE) 1000 MG tablet Take 1 tablet (1,000 mg total) by mouth 2 (two) times daily with meals.    ONETOUCH DELICA PLUS LANCET 33 gauge Misc Apply topically 3 (three) times daily.    rosuvastatin (CRESTOR) 10 MG tablet Take 1 tablet (10 mg total) by mouth once daily.    semaglutide (OZEMPIC) 2 mg/dose (8 mg/3 mL) PnIj Inject 2 mg into the skin every 7 days.     No current facility-administered medications on file prior to visit.       CBC:  Lab Results   Component Value Date    WBC 2.51 (L) 07/31/2023    HGB 10.2 (L) 07/31/2023    HCT 31.0 (L) 07/31/2023    MCV 87 07/31/2023     07/31/2023          CMP:  Sodium   Date Value Ref Range Status   07/31/2023 140 136 - 145 mmol/L Final     Potassium   Date Value Ref Range Status   07/31/2023 4.5 3.5 - 5.1 mmol/L Final     Chloride   Date Value Ref Range Status   07/31/2023 105 95 - 110 mmol/L Final     CO2   Date Value Ref Range Status   07/31/2023 26 23 - 29 mmol/L Final     Glucose   Date Value Ref Range Status   07/31/2023 118 (H) 70 - 110 mg/dL Final     BUN   Date Value Ref Range Status   07/31/2023 17 8 - 23 mg/dL Final     Creatinine   Date Value Ref Range Status   07/31/2023 1.0 0.5 - 1.4 mg/dL Final     Calcium   Date Value Ref Range Status   07/31/2023 10.1 8.7 - 10.5 mg/dL Final     Total Protein   Date Value Ref Range Status   07/31/2023 7.2 6.0 - 8.4 g/dL Final     Albumin   Date Value Ref Range Status   07/31/2023 4.1 3.5 - 5.2 g/dL Final     Total Bilirubin   Date Value Ref Range Status   07/31/2023 0.4 0.1 - 1.0 mg/dL Final     Comment:     For infants and newborns, interpretation of results should be based  on gestational age, weight and in agreement with clinical  observations.    Premature Infant recommended reference ranges:  Up to 24 hours.............<8.0 mg/dL  Up to 48 hours............<12.0 mg/dL  3-5 days..................<15.0 mg/dL  6-29 days.................<15.0 mg/dL       Alkaline Phosphatase   Date Value Ref Range Status   07/31/2023 34 (L) 55 - 135 U/L Final     AST   Date Value Ref Range Status   07/31/2023 16 10 - 40 U/L Final     ALT   Date Value Ref Range Status   07/31/2023 13 10 - 44 U/L Final     Anion Gap   Date Value Ref Range Status   07/31/2023 9 8 - 16 mmol/L Final     eGFR if    Date Value Ref Range Status   06/27/2022 >60.0 >60 mL/min/1.73 m^2 Final     eGFR if non    Date Value Ref Range Status   06/27/2022 >60.0 >60 mL/min/1.73 m^2 Final     Comment:     Calculation used to obtain the estimated glomerular filtration  rate (eGFR) is the CKD-EPI equation.          Posterior  Segment OCT Retina-Both eyes  Oct today.  -J    See pro gnote       ECOG SCORE    1 - Restricted in strenuous activity-ambulatory and able to carry out work of a light nature              Assessment/Plan:       1- Normocytic anemia:  -Hgb 10.2 stable  -I reviewed independently the patient medical record including her laboratory and radiologic findings.  The patient current workup is showing that she has a chronic normocytic anemia of unknown etiology.  Her labs ruled out vitamin B12 folate thyroid dysfunction as well as iron-deficiency.  - DANISHA came back negative.   -ESR LDH haptoglobin were normal ruling out hemolysisis.  - hemoglobin electrophoresis was negative for sickle cell disease or trait.  -The UA was negative of hematuria.  All workup failed to show a source that could explain normocytic anemia.  She will require a  bone marrow biopsy to assess possibly a bone marrow dysfunction.  The patient decided to proceed with close monitoring with repeat blood workup.  She will be seen back again in 6 months with repeat CBC CMP ESR LDH.  In case her hemoglobin dropped below 10 at that time will proceed with a bone marrow biopsy.    2-Leukopenia  - WBC 2.51 fluctuating   -after reviewing her blood workup going to 2019 the patient has been chronically leukopenic.  This could be related ethnic variation and less likely related to blood related disorder.  The patient is asymptomatic and denies any history of recurrent infections.  We will continue to monitor this abnormality with repeat CBC.  In case this worsened a bone marrow biopsy will be ordered to assess if there is a myelodysplastic disorder behind her chronic leukopenia.      3-DM type 2:  -controlled by taking metformin and Ozempic.    4- HTN:  -controlled by taking lisinopril.    5-Overweight BMI:26.99  - Patient was advised to exercise and to loose weight.                   Med Onc Chart Routing      Follow up with physician 6 months. virtual with  repeat CBC CMP ESR LDH   Follow up with G[facility]    Infusion scheduling note    Injection scheduling note    Labs    Imaging    Pharmacy appointment    Other referrals               Plan was discussed with the patient at length, and she verbalized understanding. Nohelia was given an opportunity to ask questions that were answered to her satisfaction, and she was advised to call in the interval if any problems or questions arise.  Signed:  Yolanda Gonzales MD   Hematology and Oncology  [facility] - HEMATOLOGY ONCOLOGY  [facility], [place]

## 2023-08-21 DIAGNOSIS — E11.3513 TYPE 2 DIABETES MELLITUS WITH BOTH EYES AFFECTED BY PROLIFERATIVE RETINOPATHY AND MACULAR EDEMA, WITH LONG-TERM CURRENT USE OF INSULIN: ICD-10-CM

## 2023-08-21 DIAGNOSIS — Z79.4 TYPE 2 DIABETES MELLITUS WITH BOTH EYES AFFECTED BY PROLIFERATIVE RETINOPATHY AND MACULAR EDEMA, WITH LONG-TERM CURRENT USE OF INSULIN: ICD-10-CM

## 2023-08-21 RX ORDER — SEMAGLUTIDE 1.34 MG/ML
1 INJECTION, SOLUTION SUBCUTANEOUS
Qty: 1 EACH | Refills: 6 | Status: CANCELLED | OUTPATIENT
Start: 2023-08-21 | End: 2024-08-20

## 2023-08-23 RX ORDER — CARVEDILOL 25 MG/1
TABLET ORAL
Qty: 180 TABLET | Refills: 2 | Status: SHIPPED | OUTPATIENT
Start: 2023-08-23

## 2023-08-23 NOTE — TELEPHONE ENCOUNTER
Refill Decision Note   Nohelia Rodriguez  is requesting a refill authorization.  Brief Assessment and Rationale for Refill:  Approve     Medication Therapy Plan:         Comments:     Note composed:11:34 AM 08/23/2023             Appointments     Last Visit   5/1/2023 Katelyn Barrett MD   Next Visit   11/13/2023 Katelyn Barrett MD

## 2023-08-23 NOTE — TELEPHONE ENCOUNTER
No care due was identified.  [facility] Embedded Care Due Messages. Reference number: [id number].   8/23/2023 10:27:29 AM CDT

## 2023-10-03 DIAGNOSIS — Z79.4 CONTROLLED TYPE 2 DIABETES MELLITUS WITH BOTH EYES AFFECTED BY PROLIFERATIVE RETINOPATHY AND MACULAR EDEMA, WITH LONG-TERM CURRENT USE OF INSULIN: ICD-10-CM

## 2023-10-03 DIAGNOSIS — E11.3513 CONTROLLED TYPE 2 DIABETES MELLITUS WITH BOTH EYES AFFECTED BY PROLIFERATIVE RETINOPATHY AND MACULAR EDEMA, WITH LONG-TERM CURRENT USE OF INSULIN: ICD-10-CM

## 2023-10-03 RX ORDER — METFORMIN HYDROCHLORIDE 1000 MG/1
1000 TABLET ORAL 2 TIMES DAILY WITH MEALS
Qty: 180 TABLET | Refills: 1 | Status: SHIPPED | OUTPATIENT
Start: 2023-10-03 | End: 2024-03-28

## 2023-10-03 NOTE — TELEPHONE ENCOUNTER
Refill Decision Note   Nohelia Rodriguez  is requesting a refill authorization.  Brief Assessment and Rationale for Refill:  Approve     Medication Therapy Plan:         Comments:     Note composed:11:25 AM 10/03/2023

## 2023-10-03 NOTE — TELEPHONE ENCOUNTER
No care due was identified.  [facility] Embedded Care Due Messages. Reference number: [id number].   10/03/2023 10:47:42 AM CDT

## 2023-10-05 RX ORDER — LISINOPRIL 5 MG/1
TABLET ORAL
Qty: 90 TABLET | Refills: 1 | Status: SHIPPED | OUTPATIENT
Start: 2023-10-05 | End: 2024-03-28

## 2023-10-05 NOTE — TELEPHONE ENCOUNTER
Refill Routing Note   Medication(s) are not appropriate for processing by [facility] for the following reason(s):      Drug-disease interaction    [facility] action(s):  Defer Care Due:  None identified     Medication Therapy Plan: lisinopriL and Leukopenia    Pharmacist review requested: Yes     Appointments  past 12m or future 3m with PCP    Date Provider   Last Visit   5/1/2023 Katelyn Barrett MD   Next Visit   11/13/2023 Katelyn Barrett MD   ED visits in past 90 days: 0        Note composed:7:02 AM 10/05/2023

## 2023-10-05 NOTE — TELEPHONE ENCOUNTER
No care due was identified.  [facility] Embedded Care Due Messages. Reference number: [id number].   10/05/2023 6:08:58 AM CDT

## 2023-10-05 NOTE — TELEPHONE ENCOUNTER
Refill Routing Note   Medication(s) are not appropriate for processing by [facility] for the following reason(s):      Drug-disease interaction: leukopenia    [facility] action(s):  Defer Care Due:  None identified        Pharmacist review requested: Yes     Appointments  past 12m or future 3m with PCP    Date Provider   Last Visit   5/1/2023 Katelyn Barrett MD   Next Visit   11/13/2023 Katelyn Barrett MD   ED visits in past 90 days: 0        Note composed:11:41 AM 10/05/2023

## 2023-11-03 ENCOUNTER — LAB VISIT (OUTPATIENT)
Dept: LAB | Facility: HOSPITAL | Age: 63
End: 2023-11-03
Attending: FAMILY MEDICINE
Payer: COMMERCIAL

## 2023-11-03 DIAGNOSIS — E11.8 DIABETES MELLITUS TYPE 2 WITH COMPLICATIONS: ICD-10-CM

## 2023-11-03 LAB
ESTIMATED AVG GLUCOSE: 166 MG/DL (ref 68–131)
HBA1C MFR BLD: 7.4 % (ref 4–5.6)

## 2023-11-03 PROCEDURE — 36415 COLL VENOUS BLD VENIPUNCTURE: CPT | Mod: PO | Performed by: FAMILY MEDICINE

## 2023-11-03 PROCEDURE — 83036 HEMOGLOBIN GLYCOSYLATED A1C: CPT | Performed by: FAMILY MEDICINE

## 2023-11-05 ENCOUNTER — PATIENT MESSAGE (OUTPATIENT)
Dept: FAMILY MEDICINE | Facility: CLINIC | Age: 63
End: 2023-11-05
Payer: COMMERCIAL

## 2023-11-09 ENCOUNTER — TELEPHONE (OUTPATIENT)
Dept: ENDOCRINOLOGY | Facility: CLINIC | Age: 63
End: 2023-11-09
Payer: COMMERCIAL

## 2023-11-09 DIAGNOSIS — E11.3513 TYPE 2 DIABETES MELLITUS WITH BOTH EYES AFFECTED BY PROLIFERATIVE RETINOPATHY AND MACULAR EDEMA, WITH LONG-TERM CURRENT USE OF INSULIN: Primary | ICD-10-CM

## 2023-11-09 DIAGNOSIS — Z79.4 TYPE 2 DIABETES MELLITUS WITH BOTH EYES AFFECTED BY PROLIFERATIVE RETINOPATHY AND MACULAR EDEMA, WITH LONG-TERM CURRENT USE OF INSULIN: Primary | ICD-10-CM

## 2023-11-09 RX ORDER — SEMAGLUTIDE 1.34 MG/ML
1 INJECTION, SOLUTION SUBCUTANEOUS
Qty: 3 ML | Refills: 3 | Status: SHIPPED | OUTPATIENT
Start: 2023-11-09 | End: 2024-03-15

## 2023-11-09 NOTE — TELEPHONE ENCOUNTER
----- Message from Eleazar Pagan sent at 11/9/2023  3:44 PM CST -----  Type: Needs Medical Advice  Who Called:  pt   Best Call Back Number: [phone]    Additional Information: pt stated due to rx being out of stock and on back order pt has been unable to get rx and would like to be advised on provider wants to do or if provider is aware of any locations that have rx in stock please call back to advise asap thanks! Rx: semaglutide (OZEMPIC) 2 mg/dose (8 mg/3 mL) PnIj

## 2023-11-09 NOTE — TELEPHONE ENCOUNTER
Please call pt. [facility] doesn't have this dose either. I'd advise  backing off to the 1 mg until she can get the 2 mg. I will send this in to her pharmacy so she doesn't miss a dose.

## 2023-11-10 ENCOUNTER — TELEPHONE (OUTPATIENT)
Dept: ENDOCRINOLOGY | Facility: CLINIC | Age: 63
End: 2023-11-10
Payer: COMMERCIAL

## 2023-11-10 ENCOUNTER — TELEPHONE (OUTPATIENT)
Dept: FAMILY MEDICINE | Facility: CLINIC | Age: 63
End: 2023-11-10
Payer: COMMERCIAL

## 2023-11-10 NOTE — TELEPHONE ENCOUNTER
----- Message from Eleazar Pagan sent at 11/9/2023  3:39 PM CST -----  Type:  Same day Appointment Request    Caller is requesting a sooner appointment.  Caller declined first available appointment listed below.  Caller will not accept being placed on the waitlist and is requesting a message be sent to doctor.    Name of Caller:  pt   When is the first available appointment?  NA   Symptoms:  6 month fu  Would the patient rather a call back or a response via [facility]? Call back   Best Call Back Number:  [phone]    Additional Information:  pt stated due to pt's work changing pt's day off she would like to be advised in regards to andrew appt to a Friday please call back to advise pt is provider can accommodate pt in [facility] if not please leave appt thanks!

## 2023-11-10 NOTE — TELEPHONE ENCOUNTER
Ozempic 1mg approved     Request Reference Number: [id number]. OZEMPIC INJ 4MG/3ML is approved through 11/10/2024    Case ID: [id number]

## 2023-11-13 ENCOUNTER — OFFICE VISIT (OUTPATIENT)
Dept: FAMILY MEDICINE | Facility: CLINIC | Age: 63
End: 2023-11-13
Payer: COMMERCIAL

## 2023-11-13 VITALS
OXYGEN SATURATION: 99 % | HEIGHT: 69 IN | RESPIRATION RATE: 18 BRPM | TEMPERATURE: 97 F | BODY MASS INDEX: 26.38 KG/M2 | DIASTOLIC BLOOD PRESSURE: 72 MMHG | SYSTOLIC BLOOD PRESSURE: 130 MMHG | WEIGHT: 178.13 LBS | HEART RATE: 84 BPM

## 2023-11-13 DIAGNOSIS — I15.2 HYPERTENSION ASSOCIATED WITH DIABETES: ICD-10-CM

## 2023-11-13 DIAGNOSIS — E11.8 DIABETES MELLITUS TYPE 2 WITH COMPLICATIONS: Primary | ICD-10-CM

## 2023-11-13 DIAGNOSIS — E11.69 HYPERLIPIDEMIA ASSOCIATED WITH TYPE 2 DIABETES MELLITUS: ICD-10-CM

## 2023-11-13 DIAGNOSIS — E78.5 HYPERLIPIDEMIA ASSOCIATED WITH TYPE 2 DIABETES MELLITUS: ICD-10-CM

## 2023-11-13 DIAGNOSIS — E11.59 HYPERTENSION ASSOCIATED WITH DIABETES: ICD-10-CM

## 2023-11-13 PROCEDURE — 4010F ACE/ARB THERAPY RXD/TAKEN: CPT | Mod: CPTII,S$GLB,, | Performed by: FAMILY MEDICINE

## 2023-11-13 PROCEDURE — 3051F HG A1C>EQUAL 7.0%<8.0%: CPT | Mod: CPTII,S$GLB,, | Performed by: FAMILY MEDICINE

## 2023-11-13 PROCEDURE — 3066F NEPHROPATHY DOC TX: CPT | Mod: CPTII,S$GLB,, | Performed by: FAMILY MEDICINE

## 2023-11-13 PROCEDURE — 1159F PR MEDICATION LIST DOCUMENTED IN MEDICAL RECORD: ICD-10-PCS | Mod: CPTII,S$GLB,, | Performed by: FAMILY MEDICINE

## 2023-11-13 PROCEDURE — 99214 PR OFFICE/OUTPT VISIT, EST, LEVL IV, 30-39 MIN: ICD-10-PCS | Mod: S$GLB,,, | Performed by: FAMILY MEDICINE

## 2023-11-13 PROCEDURE — 3075F PR MOST RECENT SYSTOLIC BLOOD PRESS GE 130-139MM HG: ICD-10-PCS | Mod: CPTII,S$GLB,, | Performed by: FAMILY MEDICINE

## 2023-11-13 PROCEDURE — 3008F PR BODY MASS INDEX (BMI) DOCUMENTED: ICD-10-PCS | Mod: CPTII,S$GLB,, | Performed by: FAMILY MEDICINE

## 2023-11-13 PROCEDURE — 1160F RVW MEDS BY RX/DR IN RCRD: CPT | Mod: CPTII,S$GLB,, | Performed by: FAMILY MEDICINE

## 2023-11-13 PROCEDURE — 3078F PR MOST RECENT DIASTOLIC BLOOD PRESSURE < 80 MM HG: ICD-10-PCS | Mod: CPTII,S$GLB,, | Performed by: FAMILY MEDICINE

## 2023-11-13 PROCEDURE — 3051F PR MOST RECENT HEMOGLOBIN A1C LEVEL 7.0 - < 8.0%: ICD-10-PCS | Mod: CPTII,S$GLB,, | Performed by: FAMILY MEDICINE

## 2023-11-13 PROCEDURE — 3060F POS MICROALBUMINURIA REV: CPT | Mod: CPTII,S$GLB,, | Performed by: FAMILY MEDICINE

## 2023-11-13 PROCEDURE — 1159F MED LIST DOCD IN RCRD: CPT | Mod: CPTII,S$GLB,, | Performed by: FAMILY MEDICINE

## 2023-11-13 PROCEDURE — 4010F PR ACE/ARB THEARPY RXD/TAKEN: ICD-10-PCS | Mod: CPTII,S$GLB,, | Performed by: FAMILY MEDICINE

## 2023-11-13 PROCEDURE — 3075F SYST BP GE 130 - 139MM HG: CPT | Mod: CPTII,S$GLB,, | Performed by: FAMILY MEDICINE

## 2023-11-13 PROCEDURE — 99214 OFFICE O/P EST MOD 30 MIN: CPT | Mod: S$GLB,,, | Performed by: FAMILY MEDICINE

## 2023-11-13 PROCEDURE — 1160F PR REVIEW ALL MEDS BY PRESCRIBER/CLIN PHARMACIST DOCUMENTED: ICD-10-PCS | Mod: CPTII,S$GLB,, | Performed by: FAMILY MEDICINE

## 2023-11-13 PROCEDURE — 3066F PR DOCUMENTATION OF TREATMENT FOR NEPHROPATHY: ICD-10-PCS | Mod: CPTII,S$GLB,, | Performed by: FAMILY MEDICINE

## 2023-11-13 PROCEDURE — 3060F PR POS MICROALBUMINURIA RESULT DOCUMENTED/REVIEW: ICD-10-PCS | Mod: CPTII,S$GLB,, | Performed by: FAMILY MEDICINE

## 2023-11-13 PROCEDURE — 3008F BODY MASS INDEX DOCD: CPT | Mod: CPTII,S$GLB,, | Performed by: FAMILY MEDICINE

## 2023-11-13 PROCEDURE — 3078F DIAST BP <80 MM HG: CPT | Mod: CPTII,S$GLB,, | Performed by: FAMILY MEDICINE

## 2023-11-13 NOTE — PROGRESS NOTES
Subjective:       Patient ID: Nohelia Rodriguez is a 63 y.o. female.    Chief Complaint: Follow-up    HPI  The patient is a 63-year-old who is here today for follow-up.  Overall, she is doing well.  She has been at [employer] now for 25 years.  She has moved positions and she likes her new physician.  She continues stay physically active going to the gym regularly.  She is very strict about diet.  Since May, she has lost an additional 5 lb.      Today we discussed the followin)  Diabetes.  Her recent A1c was 7.4.  She is surprised that the A1c is that high because her sugars at home have been 120 or less.  She is taking Glucophage and Ozempic.  She does have an upcoming appointment with her endocrinologist  2)  Hypertension.  Today her blood pressure is 130/72.  At home her reading this morning was 121/71.  She feels as her blood pressures are doing good consistently.  She is currently taking Norvasc, Coreg and lisinopril.    Review of Systems   Constitutional:  Negative for appetite change, chills, diaphoresis, fatigue, fever and unexpected weight change.   HENT:  Negative for congestion, ear pain, postnasal drip, rhinorrhea, sinus pressure, sneezing, sore throat and trouble swallowing.    Eyes:  Negative for pain, discharge and visual disturbance.   Respiratory:  Negative for cough, chest tightness, shortness of breath and wheezing.    Cardiovascular:  Negative for chest pain, palpitations and leg swelling.   Gastrointestinal:  Negative for abdominal distention, abdominal pain, blood in stool, constipation, diarrhea, nausea and vomiting.   Skin:  Negative for rash.         Objective:      Physical Exam  Constitutional:       General: She is not in acute distress.     Appearance: Normal appearance. She is well-developed.   HENT:      Head: Normocephalic and atraumatic.      Right Ear: Hearing, tympanic membrane, ear canal and external ear normal.      Left Ear: Hearing, tympanic membrane, ear canal and external ear  "normal.      Nose: Nose normal.      Mouth/Throat:      Mouth: No oral lesions.      Pharynx: No oropharyngeal exudate or posterior oropharyngeal erythema.   Eyes:      General: Lids are normal. No scleral icterus.     Extraocular Movements: Extraocular movements intact.      Conjunctiva/sclera: Conjunctivae normal.      Pupils: Pupils are equal, round, and reactive to light.   Neck:      Thyroid: No thyroid mass or thyromegaly.      Vascular: No carotid bruit.   Cardiovascular:      Rate and Rhythm: Normal rate and regular rhythm. No extrasystoles are present.     Chest Wall: PMI is not displaced.      Heart sounds: Normal heart sounds. No murmur heard.     No gallop.   Pulmonary:      Effort: Pulmonary effort is normal. No accessory muscle usage or respiratory distress.      Breath sounds: Normal breath sounds.   Abdominal:      General: Bowel sounds are normal. There is no abdominal bruit.      Palpations: Abdomen is soft.      Tenderness: There is no abdominal tenderness. There is no rebound.   Musculoskeletal:      Cervical back: Normal range of motion and neck supple.   Lymphadenopathy:      Head:      Right side of head: No submental or submandibular adenopathy.      Left side of head: No submental or submandibular adenopathy.      Cervical:      Right cervical: No superficial, deep or posterior cervical adenopathy.     Left cervical: No superficial, deep or posterior cervical adenopathy.      Upper Body:      Right upper body: No supraclavicular adenopathy.      Left upper body: No supraclavicular adenopathy.   Skin:     General: Skin is warm and dry.   Neurological:      Mental Status: She is alert and oriented to person, place, and time.       Blood pressure 130/72, pulse 84, temperature 97.2 °F (36.2 °C), resp. rate 18, height 5' 9" (1.753 m), weight 80.8 kg (178 lb 2.1 oz), SpO2 99 %.Body mass index is 26.31 kg/m².            A/P:  1) diabetes.  Fairly well controlled.  Continue with Glucophage and " Ozempic.  Follow up with endocrinology as planned.  We will check an A1c in 3 months.  2) hypertension.  Well controlled.  Continue with Norvasc, Coreg and lisinopril  3)  Hyperlipidemia.  Previously well controlled.  We will check a fasting lipid profile with her next set of labs.  We will continue with the Crestor        As long as she does well, I will see her back in 6 months or sooner if needed

## 2023-11-27 ENCOUNTER — TELEPHONE (OUTPATIENT)
Dept: ENDOCRINOLOGY | Facility: CLINIC | Age: 63
End: 2023-11-27

## 2023-11-27 ENCOUNTER — OFFICE VISIT (OUTPATIENT)
Dept: ENDOCRINOLOGY | Facility: CLINIC | Age: 63
End: 2023-11-27
Payer: COMMERCIAL

## 2023-11-27 VITALS
DIASTOLIC BLOOD PRESSURE: 70 MMHG | HEART RATE: 85 BPM | WEIGHT: 178 LBS | BODY MASS INDEX: 26.36 KG/M2 | HEIGHT: 69 IN | SYSTOLIC BLOOD PRESSURE: 110 MMHG

## 2023-11-27 DIAGNOSIS — I10 PRIMARY HYPERTENSION: ICD-10-CM

## 2023-11-27 DIAGNOSIS — E11.29 TYPE 2 DIABETES MELLITUS WITH MICROALBUMINURIA, WITHOUT LONG-TERM CURRENT USE OF INSULIN: ICD-10-CM

## 2023-11-27 DIAGNOSIS — R80.9 TYPE 2 DIABETES MELLITUS WITH MICROALBUMINURIA, WITHOUT LONG-TERM CURRENT USE OF INSULIN: ICD-10-CM

## 2023-11-27 DIAGNOSIS — Z79.4 TYPE 2 DIABETES MELLITUS WITH BOTH EYES AFFECTED BY PROLIFERATIVE RETINOPATHY AND MACULAR EDEMA, WITH LONG-TERM CURRENT USE OF INSULIN: Primary | ICD-10-CM

## 2023-11-27 DIAGNOSIS — E11.3513 TYPE 2 DIABETES MELLITUS WITH BOTH EYES AFFECTED BY PROLIFERATIVE RETINOPATHY AND MACULAR EDEMA, WITH LONG-TERM CURRENT USE OF INSULIN: Primary | ICD-10-CM

## 2023-11-27 PROCEDURE — 3051F PR MOST RECENT HEMOGLOBIN A1C LEVEL 7.0 - < 8.0%: ICD-10-PCS | Mod: CPTII,S$GLB,, | Performed by: NURSE PRACTITIONER

## 2023-11-27 PROCEDURE — 3074F PR MOST RECENT SYSTOLIC BLOOD PRESSURE < 130 MM HG: ICD-10-PCS | Mod: CPTII,S$GLB,, | Performed by: NURSE PRACTITIONER

## 2023-11-27 PROCEDURE — 3051F HG A1C>EQUAL 7.0%<8.0%: CPT | Mod: CPTII,S$GLB,, | Performed by: NURSE PRACTITIONER

## 2023-11-27 PROCEDURE — 3060F PR POS MICROALBUMINURIA RESULT DOCUMENTED/REVIEW: ICD-10-PCS | Mod: CPTII,S$GLB,, | Performed by: NURSE PRACTITIONER

## 2023-11-27 PROCEDURE — 1159F PR MEDICATION LIST DOCUMENTED IN MEDICAL RECORD: ICD-10-PCS | Mod: CPTII,S$GLB,, | Performed by: NURSE PRACTITIONER

## 2023-11-27 PROCEDURE — 4010F PR ACE/ARB THEARPY RXD/TAKEN: ICD-10-PCS | Mod: CPTII,S$GLB,, | Performed by: NURSE PRACTITIONER

## 2023-11-27 PROCEDURE — 99214 OFFICE O/P EST MOD 30 MIN: CPT | Mod: S$GLB,,, | Performed by: NURSE PRACTITIONER

## 2023-11-27 PROCEDURE — 3008F BODY MASS INDEX DOCD: CPT | Mod: CPTII,S$GLB,, | Performed by: NURSE PRACTITIONER

## 2023-11-27 PROCEDURE — 1160F PR REVIEW ALL MEDS BY PRESCRIBER/CLIN PHARMACIST DOCUMENTED: ICD-10-PCS | Mod: CPTII,S$GLB,, | Performed by: NURSE PRACTITIONER

## 2023-11-27 PROCEDURE — 3074F SYST BP LT 130 MM HG: CPT | Mod: CPTII,S$GLB,, | Performed by: NURSE PRACTITIONER

## 2023-11-27 PROCEDURE — 3066F PR DOCUMENTATION OF TREATMENT FOR NEPHROPATHY: ICD-10-PCS | Mod: CPTII,S$GLB,, | Performed by: NURSE PRACTITIONER

## 2023-11-27 PROCEDURE — 99214 PR OFFICE/OUTPT VISIT, EST, LEVL IV, 30-39 MIN: ICD-10-PCS | Mod: S$GLB,,, | Performed by: NURSE PRACTITIONER

## 2023-11-27 PROCEDURE — 3066F NEPHROPATHY DOC TX: CPT | Mod: CPTII,S$GLB,, | Performed by: NURSE PRACTITIONER

## 2023-11-27 PROCEDURE — 3008F PR BODY MASS INDEX (BMI) DOCUMENTED: ICD-10-PCS | Mod: CPTII,S$GLB,, | Performed by: NURSE PRACTITIONER

## 2023-11-27 PROCEDURE — 3078F DIAST BP <80 MM HG: CPT | Mod: CPTII,S$GLB,, | Performed by: NURSE PRACTITIONER

## 2023-11-27 PROCEDURE — 99999 PR PBB SHADOW E&M-EST. PATIENT-LVL IV: CPT | Mod: PBBFAC,,, | Performed by: NURSE PRACTITIONER

## 2023-11-27 PROCEDURE — 1159F MED LIST DOCD IN RCRD: CPT | Mod: CPTII,S$GLB,, | Performed by: NURSE PRACTITIONER

## 2023-11-27 PROCEDURE — 99999 PR PBB SHADOW E&M-EST. PATIENT-LVL IV: ICD-10-PCS | Mod: PBBFAC,,, | Performed by: NURSE PRACTITIONER

## 2023-11-27 PROCEDURE — 3078F PR MOST RECENT DIASTOLIC BLOOD PRESSURE < 80 MM HG: ICD-10-PCS | Mod: CPTII,S$GLB,, | Performed by: NURSE PRACTITIONER

## 2023-11-27 PROCEDURE — 3060F POS MICROALBUMINURIA REV: CPT | Mod: CPTII,S$GLB,, | Performed by: NURSE PRACTITIONER

## 2023-11-27 PROCEDURE — 1160F RVW MEDS BY RX/DR IN RCRD: CPT | Mod: CPTII,S$GLB,, | Performed by: NURSE PRACTITIONER

## 2023-11-27 PROCEDURE — 4010F ACE/ARB THERAPY RXD/TAKEN: CPT | Mod: CPTII,S$GLB,, | Performed by: NURSE PRACTITIONER

## 2023-11-27 RX ORDER — SEMAGLUTIDE 2.68 MG/ML
2 INJECTION, SOLUTION SUBCUTANEOUS
Qty: 3 ML | Refills: 6 | Status: SHIPPED | OUTPATIENT
Start: 2023-11-27 | End: 2024-11-26

## 2023-11-27 NOTE — TELEPHONE ENCOUNTER
Ozempic 2mg approved    Prior authorization approved Case ID: [id number]      Payer: [facility]    [phone]    [phone]   This medication or product was previously approved on [id number] from 2023-11-10 to 2024-11-10.

## 2023-11-27 NOTE — PROGRESS NOTES
"CC: Ms. Nohelia Rodriguez arrives today for management of Type 2 DM and review of chronic medical conditions, as listed in the Visit Diagnosis section of this encounter.       HPI: Ms. Nohelia Rodriguez was diagnosed with Type 2 DM in her 40s. She did have GDM ~ 10 years prior to Type 2 DM diagnosis. She was diagnosed based on lab work. Initial treatment consisted of orals. + FH of DM in mother, 3 sisters, 2 brothers. Denies hospitalizations due to DM.     Patient was last seen by me in July. Ozempic dose was increased at that time.     She had to resume the 1 mg dose, due to short supply of the 2 mg. She did feel that the 2 mg dose affected her BG more.     BG readings are checked 1x/day.  Reports the following:   Fastin-120    Hypoglycemia: No    Missing Insulin/PO medication doses: No    Exercise: Yes - goes to gym 3 days/week.     Dietary Habits: Eats 3 meals/day.  Avoids sugary beverages.     Last DM education appointment: 3/14/2022      CURRENT DIABETIC MEDS: metformin 1000 mg BID, Ozempic 1 mg weekly  Vial or pen: pen  Glucometer type:     Previous DM treatments:  Glyburide  Lantus  Trulicity - availability     Last Eye Exam: 2023, + DR. Jessica Herrera. Also follows with Dr. Xavire.  Last Podiatry Exam: 2021    REVIEW OF SYSTEMS  Constitutional: no c/o fatigue, weakness. + 6# weight loss since last visit.   Cardiac: no palpitations or chest pain.  Respiratory: no cough or dyspnea.  GI: no c/o abdominal pain or nausea. Denies h/o pancreatitis.   Skin: no lesions or rashes.  Neuro: no numbness, tingling, or parasthesias.  Endocrine: denies polyphagia, polydipsia, polyuria      Personally reviewed Past Medical, Surgical, Social History.    Vital Signs  /70   Pulse 85   Ht 5' 9" (1.753 m)   Wt 80.7 kg (178 lb 0.3 oz)   BMI 26.29 kg/m²     Personally reviewed the below labs:    Hemoglobin A1C   Date Value Ref Range Status   2023 7.4 (H) 4.0 - 5.6 % Final     Comment:     ADA Screening " Guidelines:  5.7-6.4%  Consistent with prediabetes  >or=6.5%  Consistent with diabetes    High levels of fetal hemoglobin interfere with the HbA1C  assay. Heterozygous hemoglobin variants (HbS, HgC, etc)do  not significantly interfere with this assay.   However, presence of multiple variants may affect accuracy.     07/17/2023 7.5 (H) 4.0 - 5.6 % Final     Comment:     ADA Screening Guidelines:  5.7-6.4%  Consistent with prediabetes  >or=6.5%  Consistent with diabetes    High levels of fetal hemoglobin interfere with the HbA1C  assay. Heterozygous hemoglobin variants (HbS, HgC, etc)do  not significantly interfere with this assay.   However, presence of multiple variants may affect accuracy.     04/17/2023 7.2 (H) 4.0 - 5.6 % Final     Comment:     ADA Screening Guidelines:  5.7-6.4%  Consistent with prediabetes  >or=6.5%  Consistent with diabetes    High levels of fetal hemoglobin interfere with the HbA1C  assay. Heterozygous hemoglobin variants (HbS, HgC, etc)do  not significantly interfere with this assay.   However, presence of multiple variants may affect accuracy.         Chemistry        Component Value Date/Time     07/31/2023 0945    K 4.5 07/31/2023 0945     07/31/2023 0945    CO2 26 07/31/2023 0945    BUN 17 07/31/2023 0945    CREATININE 1.0 07/31/2023 0945     (H) 07/31/2023 0945        Component Value Date/Time    CALCIUM 10.1 07/31/2023 0945    ALKPHOS 34 (L) 07/31/2023 0945    AST 16 07/31/2023 0945    ALT 13 07/31/2023 0945    BILITOT 0.4 07/31/2023 0945    ESTGFRAFRICA >60.0 06/27/2022 0954    EGFRNONAA >60.0 06/27/2022 0954          Lab Results   Component Value Date    CHOL 111 (L) 02/13/2023    CHOL 127 03/07/2022    CHOL 136 04/01/2021     Lab Results   Component Value Date    HDL 46 02/13/2023    HDL 60 03/07/2022    HDL 49 04/01/2021     Lab Results   Component Value Date    LDLCALC 52.2 (L) 02/13/2023    LDLCALC 52.8 (L) 03/07/2022    LDLCALC 75.4 04/01/2021     Lab Results    Component Value Date    TRIG 64 02/13/2023    TRIG 71 03/07/2022    TRIG 58 04/01/2021     Lab Results   Component Value Date    CHOLHDL 41.4 02/13/2023    CHOLHDL 47.2 03/07/2022    CHOLHDL 36.0 04/01/2021       Lab Results   Component Value Date    MICALBCREAT 76.0 (H) 02/13/2023     Lab Results   Component Value Date    TSH 0.566 02/13/2023       CrCl cannot be calculated (Patient's most recent lab result is older than the maximum 7 days allowed.).    Vit D, 25-Hydroxy   Date Value Ref Range Status   05/25/2019 36 30 - 96 ng/mL Final     Comment:     Vitamin D deficiency.........<10 ng/mL                              Vitamin D insufficiency......10-29 ng/mL       Vitamin D sufficiency........> or equal to 30 ng/mL  Vitamin D toxicity............>100 ng/mL           PHYSICAL EXAMINATION  Constitutional: Appears well, no distress  Respiratory: CTA, even and unlabored.  Cardiovascular: RRR, no murmurs, no carotid bruits.    GI: no hernia noted  Skin: warm and dry; no visible wounds  Neuro: oriented to person, place, time  Feet: appropriate footwear.         Goals        HEMOGLOBIN A1C < 7                 Assessment/Plan  1. Type 2 diabetes mellitus with both eyes affected by proliferative retinopathy and macular edema, with long-term current use of insulin  -- Uncontrolled. Has not been on 2 mg Ozempic dose consistently. She'd like to resume when able rather than changing her regimen today. We did discuss adding SGLT2-I or possibly changing Ozempic to Mounjaro if her insurance covers.   -- resume Ozempic 2 mg weekly when able  -- continue metformin  -- BG monitoring 1x/day. Advised her to alternate times.     -- Discussed diagnosis of DM, A1c goals, progression of disease, long term complications and tx options.  -- takes statin, ace-i   2. Type 2 diabetes mellitus with microalbuminuria, without long-term current use of insulin -- uncontrolled  -- continue lisinopril  -- check level with RTC   3. Primary  hypertension  -- controlled  -- continue current meds       FOLLOW UP  Follow up in about 4 months (around 3/27/2024).   Patient instructed to bring BG logs to each follow up   Patient encouraged to call for any BG/medication issues, concerns, or questions.    Orders Placed This Encounter   Procedures    Hemoglobin A1C    Comprehensive Metabolic Panel    Lipid Panel    Microalbumin/Creatinine Ratio, Urine

## 2023-12-06 RX ORDER — AMLODIPINE BESYLATE 10 MG/1
TABLET ORAL
Qty: 90 TABLET | Refills: 3 | Status: SHIPPED | OUTPATIENT
Start: 2023-12-06

## 2023-12-06 NOTE — TELEPHONE ENCOUNTER
Refill Decision Note   Nohelia Julianmanjeet  is requesting a refill authorization.  Brief Assessment and Rationale for Refill:  Approve     Medication Therapy Plan:  FLOS 01/26/24      Comments:     Note composed:7:43 AM 12/06/2023

## 2023-12-06 NOTE — TELEPHONE ENCOUNTER
Care Due:                  Date            Visit Type   Department     Provider  --------------------------------------------------------------------------------                                EP -                              PRIMARY      [facility]    Katelyn Barlowallen  Last Visit: 11-      CARE ([facility])   MEDICINE       Anger                              EP -                              PRIMARY      [facility]    Katelyn Horton Nena  Next Visit: 05-      CARE ([facility])   MEDICINE       Anger                                                            Last  Test          Frequency    Reason                     Performed    Due Date  --------------------------------------------------------------------------------    Lipid Panel.  12 months..  rosuvastatin.............  02- 02-    [employer] Embedded Care Due Messages. Reference number: [id number].   12/06/2023 5:16:16 AM CST

## 2023-12-15 ENCOUNTER — TELEPHONE (OUTPATIENT)
Dept: FAMILY MEDICINE | Facility: CLINIC | Age: 63
End: 2023-12-15
Payer: COMMERCIAL

## 2023-12-15 NOTE — TELEPHONE ENCOUNTER
Spoke with pt. Verbalizes she is currently in ER seeking treatment. Advised to keep us updated on overall outcome, verbalized understanding.

## 2023-12-15 NOTE — TELEPHONE ENCOUNTER
"I had asked LPN to call patient to triage symptoms as patient booked same day visit for "chest discomfort."    On further discussion, patient reported that pain was central chest and had started yesterday while she was mopping. Since then it comes back whenever she exerts herself at all, and only goes away with rest.     On brief review of patients chart, she has no known CAD, but has not had a stress test in our system. She has DM2, HTN, and HLD as risk factors.     Based on all of this information, I have recommended that she go to ER for evaluation immediately.     Please call back to confirm that this patient has actually gone to the ER.   "

## 2023-12-29 RX ORDER — ROSUVASTATIN CALCIUM 10 MG/1
10 TABLET, COATED ORAL
Qty: 90 TABLET | Refills: 0 | Status: SHIPPED | OUTPATIENT
Start: 2023-12-29 | End: 2024-03-15 | Stop reason: SDUPTHER

## 2023-12-29 NOTE — TELEPHONE ENCOUNTER
Refill Decision Note   Nohelia Rodriguez  is requesting a refill authorization.  Brief Assessment and Rationale for Refill:  Approve     Medication Therapy Plan:         Comments:     Note composed:5:09 PM 12/29/2023            Authored by Resident/PA/NP

## 2023-12-29 NOTE — TELEPHONE ENCOUNTER
No care due was identified.  [facility] Embedded Care Due Messages. Reference number: [id number].   12/29/2023 9:18:14 AM CST

## 2024-01-05 ENCOUNTER — TELEPHONE (OUTPATIENT)
Dept: FAMILY MEDICINE | Facility: CLINIC | Age: 64
End: 2024-01-05
Payer: COMMERCIAL

## 2024-01-05 NOTE — TELEPHONE ENCOUNTER
----- Message from Liliana Condon sent at 1/4/2024  2:02 PM CST -----  Type: Needs Medical Advice  Who Called:  patient  Symptoms (please be specific):  lower back pain  How long has patient had these symptoms:  week  Pharmacy name and phone #:    [facility] - [place], [place] - [address]  [address]  [facility] [address]  Phone: [phone] Fax: [phone]  [facility] Call Back Number: [phone] (home)   Additional Information: patient requesting a call back to discuss         Carac Counseling:  I discussed with the patient the risks of Carac including but not limited to erythema, scaling, itching, weeping, crusting, and pain.

## 2024-01-08 ENCOUNTER — OFFICE VISIT (OUTPATIENT)
Dept: FAMILY MEDICINE | Facility: CLINIC | Age: 64
End: 2024-01-08
Payer: COMMERCIAL

## 2024-01-08 VITALS
HEART RATE: 82 BPM | SYSTOLIC BLOOD PRESSURE: 132 MMHG | BODY MASS INDEX: 26.32 KG/M2 | RESPIRATION RATE: 20 BRPM | TEMPERATURE: 98 F | DIASTOLIC BLOOD PRESSURE: 78 MMHG | WEIGHT: 177.69 LBS | OXYGEN SATURATION: 100 % | HEIGHT: 69 IN

## 2024-01-08 DIAGNOSIS — M54.50 ACUTE BILATERAL LOW BACK PAIN WITHOUT SCIATICA: Primary | ICD-10-CM

## 2024-01-08 DIAGNOSIS — R07.9 CHEST PAIN, UNSPECIFIED TYPE: ICD-10-CM

## 2024-01-08 PROCEDURE — 1160F RVW MEDS BY RX/DR IN RCRD: CPT | Mod: CPTII,S$GLB,, | Performed by: FAMILY MEDICINE

## 2024-01-08 PROCEDURE — 3008F BODY MASS INDEX DOCD: CPT | Mod: CPTII,S$GLB,, | Performed by: FAMILY MEDICINE

## 2024-01-08 PROCEDURE — 99214 OFFICE O/P EST MOD 30 MIN: CPT | Mod: S$GLB,,, | Performed by: FAMILY MEDICINE

## 2024-01-08 PROCEDURE — 3078F DIAST BP <80 MM HG: CPT | Mod: CPTII,S$GLB,, | Performed by: FAMILY MEDICINE

## 2024-01-08 PROCEDURE — 1159F MED LIST DOCD IN RCRD: CPT | Mod: CPTII,S$GLB,, | Performed by: FAMILY MEDICINE

## 2024-01-08 PROCEDURE — 3075F SYST BP GE 130 - 139MM HG: CPT | Mod: CPTII,S$GLB,, | Performed by: FAMILY MEDICINE

## 2024-01-08 RX ORDER — CELECOXIB 200 MG/1
200 CAPSULE ORAL DAILY
Qty: 30 CAPSULE | Refills: 0 | Status: SHIPPED | OUTPATIENT
Start: 2024-01-08 | End: 2024-02-05

## 2024-01-08 NOTE — PROGRESS NOTES
Subjective:       Patient ID: Nohelia Rodriguez is a 63 y.o. female.    Chief Complaint: Back Pain and Hip Pain    HPI  The patient is a 63-year-old who is here today with back pain.  She has been experiencing back pain for the past couple of weeks.  She suspects her back pain is due to pushing and pulling the shopping carts at [employer].  She does work as a [employer]  but at times she is also needing to deal with the shopping carts.  In addition to the pushing and pulling action that she has from the shopping carts, she also has to bend down to get the carts in the building because she is so tall and the opening is so low.  She denies any pain radiating down into her legs.  She denies any injury or trauma to this area outside of her new activities with the shopping carts.  Her pain is worse the longer she stands or when she 1st gets up from sitting.  At work, at times she has to stand for up to 4-5 hours without any breaks.  On a scale of 1-10, her pain can be a 5 or a 6 at its worst.    Of note, in reviewing her chart, I noticed in December she went to the ER for chest pressure when she was cleaning the house. She never had chest pressure before and has not had it since.  It was recommended that she see the cardiologist but this has not yet been scheduled.    Review of Systems   Constitutional:  Negative for appetite change, chills, diaphoresis, fatigue, fever and unexpected weight change.   HENT:  Negative for congestion, ear pain, postnasal drip, rhinorrhea, sinus pressure, sneezing, sore throat and trouble swallowing.    Eyes:  Negative for pain, discharge and visual disturbance.   Respiratory:  Negative for cough, chest tightness, shortness of breath and wheezing.    Cardiovascular:  Negative for chest pain, palpitations and leg swelling.   Gastrointestinal:  Negative for abdominal distention, abdominal pain, blood in stool, constipation, diarrhea, nausea and vomiting.   Musculoskeletal:  Positive for back  pain.   Skin:  Negative for rash.         Objective:      Physical Exam  Constitutional:       General: She is not in acute distress.     Appearance: Normal appearance. She is well-developed.   HENT:      Head: Normocephalic and atraumatic.      Right Ear: Hearing, tympanic membrane, ear canal and external ear normal.      Left Ear: Hearing, tympanic membrane, ear canal and external ear normal.      Nose: Nose normal.      Mouth/Throat:      Mouth: No oral lesions.      Pharynx: No oropharyngeal exudate or posterior oropharyngeal erythema.   Eyes:      General: Lids are normal. No scleral icterus.     Extraocular Movements: Extraocular movements intact.      Conjunctiva/sclera: Conjunctivae normal.      Pupils: Pupils are equal, round, and reactive to light.   Neck:      Thyroid: No thyroid mass or thyromegaly.      Vascular: No carotid bruit.   Cardiovascular:      Rate and Rhythm: Normal rate and regular rhythm. No extrasystoles are present.     Chest Wall: PMI is not displaced.      Heart sounds: Normal heart sounds. No murmur heard.     No gallop.   Pulmonary:      Effort: Pulmonary effort is normal. No accessory muscle usage or respiratory distress.      Breath sounds: Normal breath sounds.   Abdominal:      General: Bowel sounds are normal. There is no abdominal bruit.      Palpations: Abdomen is soft.      Tenderness: There is no abdominal tenderness. There is no rebound.   Musculoskeletal:      Cervical back: Normal range of motion and neck supple.      Lumbar back: Tenderness (in low back across SI region) present. No bony tenderness. Negative right straight leg raise test and negative left straight leg raise test.   Lymphadenopathy:      Head:      Right side of head: No submental or submandibular adenopathy.      Left side of head: No submental or submandibular adenopathy.      Cervical:      Right cervical: No superficial, deep or posterior cervical adenopathy.     Left cervical: No superficial, deep or  "posterior cervical adenopathy.      Upper Body:      Right upper body: No supraclavicular adenopathy.      Left upper body: No supraclavicular adenopathy.   Skin:     General: Skin is warm and dry.   Neurological:      Mental Status: She is alert and oriented to person, place, and time.       Blood pressure 132/78, pulse 82, temperature 98.3 °F (36.8 °C), resp. rate 20, height 5' 9" (1.753 m), weight 80.6 kg (177 lb 11.1 oz), SpO2 100 %.Body mass index is 26.24 kg/m².            A/P:  1) low back pain.  Acute.  I did prescribe Celebrex 200 mg once a day for 10-14 days.  I am also going to provide activity modifications at work which will include no consistent  for more than 2 hours, the ability to sit as needed and no pushing or pulling over 5 lb.  If her pain does not improve or if it worsens, we will send her to PT.    2) recent bout of exertional chest pain.  New to me.  We will schedule her with Cardiology    As long as she does well, I will see her back as previously planned    "

## 2024-01-08 NOTE — LETTER
January 8, 2024      [facility]  [address] [address] [address]  [facility] [address]  Phone: [phone]  Fax: [phone]       Patient: Nohelia Rodriguez   YOB: 1960  Date of Visit: 01/08/2024    To Whom It May Concern:    Donna Rodriguez  was at [facility] on 01/08/2024. The patient may return to work/school on 01/08/2024 with restrictions.  Patient must not push or pull more than five pounds.  Patient must not stand for longer than two hours at a time.  Patient must be allowed to sit as needed.  If you have any questions or concerns, or if I can be of further assistance, please do not hesitate to contact me.    Sincerely,    MD Eliana German LPN

## 2024-01-12 ENCOUNTER — TELEPHONE (OUTPATIENT)
Dept: FAMILY MEDICINE | Facility: CLINIC | Age: 64
End: 2024-01-12
Payer: COMMERCIAL

## 2024-01-12 NOTE — TELEPHONE ENCOUNTER
Josue with call back number advising that she is scheduled for 3/11/2024 for re-evaluation.  Forms for employer completed and faxed.

## 2024-01-16 ENCOUNTER — TELEPHONE (OUTPATIENT)
Dept: FAMILY MEDICINE | Facility: CLINIC | Age: 64
End: 2024-01-16
Payer: COMMERCIAL

## 2024-01-16 NOTE — TELEPHONE ENCOUNTER
Pt's insurance is stating that she has to be off if she can only stand for two hours of her shift.  Read the paperwork to pt.  It specifically says pt will need a stool after two hours of standing.  Pt is going to reach back out to her insurance.

## 2024-01-16 NOTE — TELEPHONE ENCOUNTER
----- Message from Jyoti Flowers sent at 1/16/2024  9:02 AM CST -----  Regarding: Paperwork  Contact: Patient  Type: Needs Medical Advice  Who Called:  Patient  Symptoms (please be specific):    How long has patient had these symptoms:    Pharmacy name and phone #:    [facility] Call Back Number: [phone] (mobile)    Additional Information: Patient would like a call from office regarding some medical paperwork needed for her job. Please call patient to advise. Thanks!

## 2024-01-17 ENCOUNTER — TELEPHONE (OUTPATIENT)
Dept: FAMILY MEDICINE | Facility: CLINIC | Age: 64
End: 2024-01-17
Payer: COMMERCIAL

## 2024-01-17 NOTE — TELEPHONE ENCOUNTER
----- Message from Debra Chavez sent at 1/16/2024  3:42 PM CST -----  Regarding: Pt Advice  Needs Medical Advice      Who Called: Pt         Would the patient rather a call back or a response via [facility]? Call back    [facility] Call Back Number: [phone]      Additional Information: Sts needing a call from the office to go over some forms that were sent out.   Please Advise - Thank you

## 2024-01-17 NOTE — TELEPHONE ENCOUNTER
Pt states that she spoke to José Miguel.  They advised that her job description requires her to be able to lift 25-29 pounds and she will not be able to work if not updated.  Pt states she does not feel that she needs to be out of work completely and would like to know if recommendations can be amended.  Please advise.

## 2024-01-18 ENCOUNTER — TELEPHONE (OUTPATIENT)
Dept: FAMILY MEDICINE | Facility: CLINIC | Age: 64
End: 2024-01-18
Payer: COMMERCIAL

## 2024-01-18 NOTE — TELEPHONE ENCOUNTER
----- Message from Nicola Saleh sent at 1/18/2024 10:25 AM CST -----  Regarding: rt call  Type:  Patient Returning Call    Who Called:pt    Who Left Message for Patient:Eliana Schmitz    Does the patient know what this is regarding?:yes    [facility] Call Back Number:[phone]      Additional Information: pt st that she got sent home from work today. She needs the forms updated.   Please call to discuss.

## 2024-01-18 NOTE — TELEPHONE ENCOUNTER
----- Message from Candis Francis sent at 1/18/2024 12:32 PM CST -----  Regarding: advice  Contact: patient  Type: Needs Medical Advice  Who Called:  patient  Symptoms (please be specific):    How long has patient had these symptoms:    Pharmacy name and phone #:    [facility] Call Back Number: [phone] (home)     Additional Information: Patient states she needs to speak to someone concerning her return to work forms.  Please call patient to advise.  Thanks!

## 2024-01-19 ENCOUNTER — TELEPHONE (OUTPATIENT)
Dept: FAMILY MEDICINE | Facility: CLINIC | Age: 64
End: 2024-01-19
Payer: COMMERCIAL

## 2024-01-19 NOTE — LETTER
January 19, 2024      [address] [address] [address]  [facility] [address]  Phone: [phone]  Fax: [phone]       Patient: Nohelia Rodriguez   YOB: 1960      To Whom It May Concern:        The patient may return to work/school on 01/20/2024 with no restrictions. If you have any questions or concerns, or if I can be of further assistance, please do not hesitate to contact me.    Sincerely,      MD Eliana German LPN

## 2024-01-19 NOTE — TELEPHONE ENCOUNTER
----- Message from Rayne Perez sent at 1/19/2024  7:57 AM CST -----  Contact: patient  Type:  Needs Medical Advice    Who Called: patient     Would the patient rather a call back or a response via [facility]? Call     [facility] Call Back Number: [phone] (home)      Additional Information: Patient would like to speak with the nurse in regards to forms for her job.     Please call to advise

## 2024-01-19 NOTE — TELEPHONE ENCOUNTER
Pt called and due to the amount of problems she is having with her job, she is requesting a letter releasing her to full duty.  Please advise.    No

## 2024-01-19 NOTE — TELEPHONE ENCOUNTER
"Pt reports that she is "feeling good".  States since taking medications she is not having any more pain.  She declined PT.  Please advise.   "

## 2024-01-22 ENCOUNTER — TELEPHONE (OUTPATIENT)
Dept: FAMILY MEDICINE | Facility: CLINIC | Age: 64
End: 2024-01-22
Payer: COMMERCIAL

## 2024-01-22 NOTE — TELEPHONE ENCOUNTER
----- Message from Debra Chavez sent at 1/22/2024 12:08 PM CST -----  Regarding: Pt Advice  Needs Medical Advice      Who Called: Pt         Would the patient rather a call back or a response via [facility]? Call back    [facility] Call Back Number:  [phone]      Additional Information: Sts she spoke with the office that handles her office leave this morning and they do not have the fax for the pt to return to work. Would like call from the office.   Please Advise - Thank you

## 2024-01-24 ENCOUNTER — TELEPHONE (OUTPATIENT)
Dept: FAMILY MEDICINE | Facility: CLINIC | Age: 64
End: 2024-01-24
Payer: COMMERCIAL

## 2024-01-24 NOTE — TELEPHONE ENCOUNTER
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 PLEASE REACH OUT TO PT ON THIS MATTER     Would the patient rather a call back YES     [facility] Call Back Number: [phone]    Additional Information: Thank You

## 2024-01-26 ENCOUNTER — LAB VISIT (OUTPATIENT)
Dept: LAB | Facility: HOSPITAL | Age: 64
End: 2024-01-26
Attending: INTERNAL MEDICINE
Payer: COMMERCIAL

## 2024-01-26 DIAGNOSIS — D64.9 ANEMIA, NORMOCYTIC NORMOCHROMIC: ICD-10-CM

## 2024-01-26 DIAGNOSIS — D72.819 LEUKOPENIA, UNSPECIFIED TYPE: ICD-10-CM

## 2024-01-26 DIAGNOSIS — E11.8 DIABETES MELLITUS TYPE 2 WITH COMPLICATIONS: ICD-10-CM

## 2024-01-26 DIAGNOSIS — E78.5 HYPERLIPIDEMIA ASSOCIATED WITH TYPE 2 DIABETES MELLITUS: ICD-10-CM

## 2024-01-26 DIAGNOSIS — E11.69 HYPERLIPIDEMIA ASSOCIATED WITH TYPE 2 DIABETES MELLITUS: ICD-10-CM

## 2024-01-26 LAB
ALBUMIN SERPL BCP-MCNC: 4.2 G/DL (ref 3.5–5.2)
ALP SERPL-CCNC: 36 U/L (ref 55–135)
ALT SERPL W/O P-5'-P-CCNC: 11 U/L (ref 10–44)
ANION GAP SERPL CALC-SCNC: 8 MMOL/L (ref 8–16)
AST SERPL-CCNC: 14 U/L (ref 10–40)
BASOPHILS # BLD AUTO: 0.03 K/UL (ref 0–0.2)
BASOPHILS NFR BLD: 1.2 % (ref 0–1.9)
BILIRUB SERPL-MCNC: 0.3 MG/DL (ref 0.1–1)
BUN SERPL-MCNC: 26 MG/DL (ref 8–23)
CALCIUM SERPL-MCNC: 10.4 MG/DL (ref 8.7–10.5)
CHLORIDE SERPL-SCNC: 106 MMOL/L (ref 95–110)
CHOLEST SERPL-MCNC: 115 MG/DL (ref 120–199)
CHOLEST/HDLC SERPL: 2.3 {RATIO} (ref 2–5)
CO2 SERPL-SCNC: 26 MMOL/L (ref 23–29)
CREAT SERPL-MCNC: 1.1 MG/DL (ref 0.5–1.4)
DIFFERENTIAL METHOD BLD: ABNORMAL
EOSINOPHIL # BLD AUTO: 0.1 K/UL (ref 0–0.5)
EOSINOPHIL NFR BLD: 3.7 % (ref 0–8)
ERYTHROCYTE [DISTWIDTH] IN BLOOD BY AUTOMATED COUNT: 13.5 % (ref 11.5–14.5)
ERYTHROCYTE [SEDIMENTATION RATE] IN BLOOD BY PHOTOMETRIC METHOD: 14 MM/HR (ref 0–36)
EST. GFR  (NO RACE VARIABLE): 56.5 ML/MIN/1.73 M^2
ESTIMATED AVG GLUCOSE: 148 MG/DL (ref 68–131)
GLUCOSE SERPL-MCNC: 120 MG/DL (ref 70–110)
HBA1C MFR BLD: 6.8 % (ref 4–5.6)
HCT VFR BLD AUTO: 31.3 % (ref 37–48.5)
HDLC SERPL-MCNC: 49 MG/DL (ref 40–75)
HDLC SERPL: 42.6 % (ref 20–50)
HGB BLD-MCNC: 10.2 G/DL (ref 12–16)
IMM GRANULOCYTES # BLD AUTO: 0 K/UL (ref 0–0.04)
IMM GRANULOCYTES NFR BLD AUTO: 0 % (ref 0–0.5)
LDH SERPL L TO P-CCNC: 135 U/L (ref 110–260)
LDLC SERPL CALC-MCNC: 55.4 MG/DL (ref 63–159)
LYMPHOCYTES # BLD AUTO: 1.1 K/UL (ref 1–4.8)
LYMPHOCYTES NFR BLD: 45.9 % (ref 18–48)
MCH RBC QN AUTO: 27.6 PG (ref 27–31)
MCHC RBC AUTO-ENTMCNC: 32.6 G/DL (ref 32–36)
MCV RBC AUTO: 85 FL (ref 82–98)
MONOCYTES # BLD AUTO: 0.2 K/UL (ref 0.3–1)
MONOCYTES NFR BLD: 9.5 % (ref 4–15)
NEUTROPHILS # BLD AUTO: 1 K/UL (ref 1.8–7.7)
NEUTROPHILS NFR BLD: 39.7 % (ref 38–73)
NONHDLC SERPL-MCNC: 66 MG/DL
NRBC BLD-RTO: 0 /100 WBC
PLATELET # BLD AUTO: 207 K/UL (ref 150–450)
PMV BLD AUTO: 9.9 FL (ref 9.2–12.9)
POTASSIUM SERPL-SCNC: 4.3 MMOL/L (ref 3.5–5.1)
PROT SERPL-MCNC: 7.4 G/DL (ref 6–8.4)
RBC # BLD AUTO: 3.69 M/UL (ref 4–5.4)
SODIUM SERPL-SCNC: 140 MMOL/L (ref 136–145)
TRIGL SERPL-MCNC: 53 MG/DL (ref 30–150)
WBC # BLD AUTO: 2.42 K/UL (ref 3.9–12.7)

## 2024-01-26 PROCEDURE — 80053 COMPREHEN METABOLIC PANEL: CPT | Mod: PN | Performed by: INTERNAL MEDICINE

## 2024-01-26 PROCEDURE — 80061 LIPID PANEL: CPT | Performed by: FAMILY MEDICINE

## 2024-01-26 PROCEDURE — 83036 HEMOGLOBIN GLYCOSYLATED A1C: CPT | Performed by: FAMILY MEDICINE

## 2024-01-26 PROCEDURE — 85025 COMPLETE CBC W/AUTO DIFF WBC: CPT | Mod: PN | Performed by: INTERNAL MEDICINE

## 2024-01-26 PROCEDURE — 36415 COLL VENOUS BLD VENIPUNCTURE: CPT | Mod: PN | Performed by: INTERNAL MEDICINE

## 2024-01-26 PROCEDURE — 85652 RBC SED RATE AUTOMATED: CPT | Performed by: INTERNAL MEDICINE

## 2024-01-26 PROCEDURE — 83615 LACTATE (LD) (LDH) ENZYME: CPT | Mod: PN | Performed by: INTERNAL MEDICINE

## 2024-01-27 ENCOUNTER — PATIENT MESSAGE (OUTPATIENT)
Dept: FAMILY MEDICINE | Facility: CLINIC | Age: 64
End: 2024-01-27
Payer: COMMERCIAL

## 2024-01-29 ENCOUNTER — TELEPHONE (OUTPATIENT)
Dept: CARDIOLOGY | Facility: CLINIC | Age: 64
End: 2024-01-29
Payer: COMMERCIAL

## 2024-02-05 ENCOUNTER — OFFICE VISIT (OUTPATIENT)
Dept: CARDIOLOGY | Facility: CLINIC | Age: 64
End: 2024-02-05
Payer: COMMERCIAL

## 2024-02-05 VITALS
HEIGHT: 69 IN | WEIGHT: 178.56 LBS | DIASTOLIC BLOOD PRESSURE: 83 MMHG | HEART RATE: 86 BPM | BODY MASS INDEX: 26.45 KG/M2 | SYSTOLIC BLOOD PRESSURE: 133 MMHG

## 2024-02-05 DIAGNOSIS — N18.30 CONTROLLED TYPE 2 DIABETES MELLITUS WITH STAGE 3 CHRONIC KIDNEY DISEASE, WITHOUT LONG-TERM CURRENT USE OF INSULIN: ICD-10-CM

## 2024-02-05 DIAGNOSIS — R07.2 PRECORDIAL PAIN: Primary | ICD-10-CM

## 2024-02-05 DIAGNOSIS — I10 PRIMARY HYPERTENSION: ICD-10-CM

## 2024-02-05 DIAGNOSIS — E11.22 CONTROLLED TYPE 2 DIABETES MELLITUS WITH STAGE 3 CHRONIC KIDNEY DISEASE, WITHOUT LONG-TERM CURRENT USE OF INSULIN: ICD-10-CM

## 2024-02-05 LAB
OHS QRS DURATION: 90 MS
OHS QTC CALCULATION: 415 MS

## 2024-02-05 PROCEDURE — 3075F SYST BP GE 130 - 139MM HG: CPT | Mod: CPTII,S$GLB,, | Performed by: INTERNAL MEDICINE

## 2024-02-05 PROCEDURE — 99204 OFFICE O/P NEW MOD 45 MIN: CPT | Mod: S$GLB,,, | Performed by: INTERNAL MEDICINE

## 2024-02-05 PROCEDURE — 3079F DIAST BP 80-89 MM HG: CPT | Mod: CPTII,S$GLB,, | Performed by: INTERNAL MEDICINE

## 2024-02-05 PROCEDURE — 93010 ELECTROCARDIOGRAM REPORT: CPT | Mod: S$GLB,,, | Performed by: INTERNAL MEDICINE

## 2024-02-05 PROCEDURE — 99999 PR PBB SHADOW E&M-EST. PATIENT-LVL IV: CPT | Mod: PBBFAC,,, | Performed by: INTERNAL MEDICINE

## 2024-02-05 PROCEDURE — 3008F BODY MASS INDEX DOCD: CPT | Mod: CPTII,S$GLB,, | Performed by: INTERNAL MEDICINE

## 2024-02-05 PROCEDURE — 1159F MED LIST DOCD IN RCRD: CPT | Mod: CPTII,S$GLB,, | Performed by: INTERNAL MEDICINE

## 2024-02-05 PROCEDURE — 93005 ELECTROCARDIOGRAM TRACING: CPT | Mod: PO

## 2024-02-05 PROCEDURE — 3044F HG A1C LEVEL LT 7.0%: CPT | Mod: CPTII,S$GLB,, | Performed by: INTERNAL MEDICINE

## 2024-02-05 NOTE — PATIENT INSTRUCTIONS
Stress test  Before the stress test do not take your carvedilol for 12 hrs and no caffeine for 12 hrs  Return in 6 months

## 2024-02-05 NOTE — LETTER
February 5, 2024        Katelyn Barrett MD  [address] [address] [place] [address]             [facility]  [address] [place] [address]  Phone: [phone]   Patient: Nohelia Rodriguez   MR Number: [id number]   YOB: 1960   Date of Visit: 2/5/2024       Dear Dr. Barrett:    I had the pleasure of seeing Nohelia Rodriguez today in consult. Attached you will find relevant portions of my assessment and plan of care.    If you have questions, please do not hesitate to call me. I look forward to following them along with you.    Sincerely,      Alexander Sheikh MD, [facility]  Interventional Cardiology/Structural Heart Disease  [facility]  Office: [phone]   Cell: [phone]          CC  No Recipients    Enclosure               ASSESSMENT & PLAN:    This is a 63 y.o. pleasant  female with a single episode of substernal chest discomfort after exertion and a meal.  Her risk factors are type 2 diabetes, hypertension and CKD 3A.  She is on an excellent medical regimen.     1. Precordial pain  Ambulatory referral/consult to Cardiology    IN OFFICE EKG 12-LEAD (to Muse)    Nuclear Stress - Cardiology Interpreted      2. Primary hypertension        3. Controlled type 2 diabetes mellitus with stage 3 chronic kidney disease, without long-term current use of insulin             Exercise stress SPECT MPI  Hold carvedilol for 12 hours prior  Continue rosuvastatin 10 mg daily .  Her LDL cholesterol is excellent  Continue amlodipine 10 mg daily, lisinopril 5 mg daily and carvedilol 25 mg twice daily for hypertension  Continue metformin and semaglutide for diabetes  Emphasized the importance of modifying lifestyle related risk factors including exercise, diet most resembling a Mediterranean diet.    I appreciate the opportunity to participate in Nohelia Rodriguez 's care today.  Please follow up with me in 6 months, sooner if needed.      Alexander Sheikh  MD, formerly [facility]  Interventional Cardiology/Structural Heart Disease  [facility]  Office: [phone]

## 2024-02-05 NOTE — PROGRESS NOTES
Cardiology Clinic Note  Date: 2/5/24    Patient: Nohelia Rodriguez, 1960, [id number]  Primary Care Provider: Katelyn Barrett MD     Chief Complaint/Reason for Referral: chest pain    Subjective     Nohelia Rodriguez is a 63 y.o. female who presents for establishment of care. They are not accompanied. They were referred by Dr. Barrett.    Had an episode of substernal chest discomfort, dull, after she had had lunch and cleaned her house. Didn't feel like heartburn. Lasted few minutes. No radiation, no diaphoresis, no lightheadedness, no dyspnea. No palpitations. No edema. No orthopnea. No PND. No claudication. No wounds or sores on the feet.     Goes to gym 3 times a week. Went last week. Works with , treadmill and other machines but not very exerting.     Focused Past History includes:  Type 2 diabetes, not on insulin   Hypertension   Chronic Anemia with hemoglobin 10-11  CKD 3A  Never smoker. No EtOH  Mother had heart disease in her late age; doesn't know about father's medical history. Sister with heart disease in her 60s.       Current Outpatient Medications   Medication Sig    amLODIPine (NORVASC) 10 MG tablet Take 1 tablet by mouth once daily    blood sugar diagnostic Strp To check BG QD times daily, to use with insurance preferred meter    blood-glucose meter kit As directed    carvediloL (COREG) 25 MG tablet TAKE 1 TABLET BY MOUTH TWICE DAILY WITH MEALS    lancets Misc To check BG QD times daily, to use with insurance preferred meter    latanoprost 0.005 % ophthalmic solution Place 1 drop into both eyes every evening.     lisinopriL (PRINIVIL,ZESTRIL) 5 MG tablet Take 1 tablet by mouth once daily    metFORMIN (GLUCOPHAGE) 1000 MG tablet TAKE 1 TABLET BY MOUTH TWICE DAILY WITH MEALS    ONETOUCH DELICA PLUS LANCET 33 gauge Misc Apply topically 3 (three) times daily.    rosuvastatin (CRESTOR) 10 MG tablet Take 1 tablet by mouth once daily    semaglutide (OZEMPIC) 1 mg/dose (4 mg/3 mL) Inject 1 mg into  the skin every 7 days.    semaglutide (OZEMPIC) 2 mg/dose (8 mg/3 mL) PnIj Inject 2 mg into the skin every 7 days.     No current facility-administered medications for this visit.               Review of Systems  Constitutional: negative for fevers, night sweats, and weight loss  Eyes: negative for visual disturbance, diplopia  Respiratory: negative for cough, hemoptysis, sputum, and wheezing  Cardiovascular: see HPI  Gastrointestinal: negative for abdominal pain, bright red blood per rectum, change in bowel habits, dysphagia, melena, and reflux symptoms  Genitourinary:negative for dysuria, frequency, and hematuria  Hematologic/lymphatic: negative for bleeding, easy bruising, and lymphadenopathy  Musculoskeletal:negative for arthralgias, back pain, and myalgias  Neurological: negative for gait problems, paresthesia, speech problems, vertigo, and weakness  Behavioral/Psych: negative for excessive alcohol consumption, illegal drug usage, and sleep disturbance    ----------------------------  Diabetes mellitus  Diabetic retinopathy      Comment:  s/p laser treatment  Glaucoma  Hypertension  S/P colonoscopy      Comment:  3/19 next due 3/29  ----------------------------  Colonoscopy      Comment:  Procedure: COLONOSCOPY;  Surgeon: Kishor Membreno MD;  Location: [facility];  Service: Endoscopy;                 Laterality: N/A;  Diabetic retinopathy laser     Family History   Problem Relation Age of Onset    Diabetes Mother     Cataracts Mother     Hypertension Mother     Diabetes Sister     Hypertension Sister     Hyperlipidemia Sister         a fib    Diabetes Brother     Hypertension Brother     Hydrocephalus Son     Diabetes Brother     Diabetes Sister     Diabetes Sister      Social History     Tobacco Use    Smoking status: Never    Smokeless tobacco: Never   Substance Use Topics    Alcohol use: No    Drug use: No         Physical Exam  /83 (BP Location: Right arm, Patient Position: Lying, BP  "Method: Medium (Automatic))   Pulse 86   Ht 5' 9" (1.753 m)   Wt 81 kg (178 lb 9.2 oz)   BMI 26.37 kg/m²   Body surface area is 1.99 meters squared.  Body mass index is 26.37 kg/m².    General appearance: alert, appears stated age, cooperative, and no distress  Head: Normocephalic, without obvious abnormality, atraumatic  Neck: no carotid bruit, no JVD, and supple, symmetrical, trachea midline  Lungs: clear to auscultation bilaterally  Heart: regular rate and rhythm; S1, S2 normal, no murmur, click, rub or gallop  Abdomen: soft, non-tender, no distended  Extremities: extremities atraumatic, no pitting edema  Skin: warm, no cyanosis, no pathologic ecchymosis in exposed portions  Neurologic: Grossly normal. A&O x3      Labs Reviewed     Lab Results   Component Value Date    WBC 2.42 (L) 01/26/2024    HGB 10.2 (L) 01/26/2024     01/26/2024       Lab Results   Component Value Date     01/26/2024    K 4.3 01/26/2024     01/26/2024    CO2 26 01/26/2024    CALCIUM 10.4 01/26/2024    MG 1.4 (L) 12/15/2023     (H) 01/26/2024    PROT 7.4 01/26/2024       Lab Results   Component Value Date    BUN 26 (H) 01/26/2024    BUN 18 12/15/2023    BUN 17 07/31/2023    CREATININE 1.1 01/26/2024    CREATININE 0.90 12/15/2023    CREATININE 1.0 07/31/2023    EGFRNORACEVR 56.5 (A) 01/26/2024    EGFRNORACEVR >60 12/15/2023    EGFRNORACEVR >60.0 07/31/2023       Lab Results   Component Value Date    ALBUMIN 4.2 01/26/2024    BILITOT 0.3 01/26/2024    ALKPHOS 36 (L) 01/26/2024    ALT 11 01/26/2024       Lab Results   Component Value Date    TRIG 53 01/26/2024    CHOL 115 (L) 01/26/2024    HDL 49 01/26/2024    LDLCALC 55.4 (L) 01/26/2024    LDLCALC 52.2 (L) 02/13/2023       Lab Results   Component Value Date    HGBA1C 6.8 (H) 01/26/2024    HGBA1C 7.4 (H) 11/03/2023    TSH 0.566 02/13/2023       Lab Results   Component Value Date    NTPROBNP 119 12/15/2023         EKG 12/15/2023:  Normal sinus rhythm, within normal " limits    EKG today:  Normal sinus rhythm, within normal limits            ASSESSMENT & PLAN:    This is a 63 y.o. pleasant  female with a single episode of substernal chest discomfort after exertion and a meal.  Her risk factors are type 2 diabetes, hypertension and CKD 3A.  She is on an excellent medical regimen.     1. Precordial pain  Ambulatory referral/consult to Cardiology    IN OFFICE EKG 12-LEAD (to Muse)    Nuclear Stress - Cardiology Interpreted      2. Primary hypertension        3. Controlled type 2 diabetes mellitus with stage 3 chronic kidney disease, without long-term current use of insulin             Exercise stress SPECT MPI  Hold carvedilol for 12 hours prior  Continue rosuvastatin 10 mg daily .  Her LDL cholesterol is excellent  Continue amlodipine 10 mg daily, lisinopril 5 mg daily and carvedilol 25 mg twice daily for hypertension  Continue metformin and semaglutide for diabetes  Emphasized the importance of modifying lifestyle related risk factors including exercise, diet most resembling a Mediterranean diet.    I appreciate the opportunity to participate in Nohelia Rodriguez 's care today.  Please follow up with me in 6 months, sooner if needed.      Alexander Sheikh MD, [facility]  Interventional Cardiology/Structural Heart Disease  [facility]  Office: [phone]            Parts of this note were completed using voice recognition software. Please excuse any misspellings or syntax errors and reach out to me with questions.

## 2024-02-07 ENCOUNTER — TELEPHONE (OUTPATIENT)
Dept: HEMATOLOGY/ONCOLOGY | Facility: CLINIC | Age: 64
End: 2024-02-07
Payer: COMMERCIAL

## 2024-02-07 NOTE — TELEPHONE ENCOUNTER
Returned call to pt to r/s her missed virtual appt for today 2/7/24. Pt r/s for March 15, 2024. Pt verbalized understandng and agreed with new appointment       ----- Message from Sachi Townsend sent at 2/7/2024  3:18 PM CST -----  Type: Need Medical Advice   Who Called: Patient    Best callback number: [phone]  Additional Information: patient called to reschedule her missed appointment from today to a  virtual on Friday due to being off from work. Patient stated she is off every Friday  Please call to further assist, Thanks.

## 2024-02-07 NOTE — TELEPHONE ENCOUNTER
Left message to call the office to schedule/reschedule appt. Recall number provided.  ----- Message from Becky Stearns, Patient Care Assistant sent at 2/7/2024  2:44 PM CST -----  Regarding: airam  Type: Needs Medical Advice  Who Called:  sabina Richards Call Back Number: [phone]    Additional Information: sabina needs to reschedule her 2/7 virtual visit to Friday if possible ( only day she is off work)  please call to further discuss ,thank you ,

## 2024-02-21 ENCOUNTER — PATIENT MESSAGE (OUTPATIENT)
Dept: RADIOLOGY | Facility: HOSPITAL | Age: 64
End: 2024-02-21
Payer: COMMERCIAL

## 2024-02-23 ENCOUNTER — HOSPITAL ENCOUNTER (OUTPATIENT)
Dept: RADIOLOGY | Facility: HOSPITAL | Age: 64
Discharge: HOME | End: 2024-02-23
Attending: INTERNAL MEDICINE
Payer: COMMERCIAL

## 2024-02-23 ENCOUNTER — HOSPITAL ENCOUNTER (OUTPATIENT)
Dept: CARDIOLOGY | Facility: HOSPITAL | Age: 64
Discharge: HOME | End: 2024-02-23
Attending: INTERNAL MEDICINE
Payer: COMMERCIAL

## 2024-02-23 VITALS — BODY MASS INDEX: 26.36 KG/M2 | HEIGHT: 69 IN | WEIGHT: 178 LBS

## 2024-02-23 DIAGNOSIS — R07.2 PRECORDIAL PAIN: ICD-10-CM

## 2024-02-23 PROCEDURE — 93017 CV STRESS TEST TRACING ONLY: CPT | Mod: PO

## 2024-02-23 PROCEDURE — 78452 HT MUSCLE IMAGE SPECT MULT: CPT | Mod: PO

## 2024-02-23 PROCEDURE — 78452 HT MUSCLE IMAGE SPECT MULT: CPT | Mod: 26,,, | Performed by: INTERNAL MEDICINE

## 2024-02-23 PROCEDURE — 63600175 PHARM REV CODE 636 W HCPCS: Mod: PO | Performed by: INTERNAL MEDICINE

## 2024-02-23 PROCEDURE — 93016 CV STRESS TEST SUPVJ ONLY: CPT | Mod: S$GLB,,, | Performed by: INTERNAL MEDICINE

## 2024-02-23 PROCEDURE — A9502 TC99M TETROFOSMIN: HCPCS | Mod: PO | Performed by: INTERNAL MEDICINE

## 2024-02-23 PROCEDURE — 93018 CV STRESS TEST I&R ONLY: CPT | Mod: S$GLB,,, | Performed by: INTERNAL MEDICINE

## 2024-02-23 RX ORDER — REGADENOSON 0.08 MG/ML
0.4 INJECTION, SOLUTION INTRAVENOUS
Status: COMPLETED | OUTPATIENT
Start: 2024-02-23 | End: 2024-02-23

## 2024-02-23 RX ADMIN — TETROFOSMIN 33 MILLICURIE: 1.38 INJECTION, POWDER, LYOPHILIZED, FOR SOLUTION INTRAVENOUS at 10:02

## 2024-02-23 RX ADMIN — TETROFOSMIN 10.3 MILLICURIE: 1.38 INJECTION, POWDER, LYOPHILIZED, FOR SOLUTION INTRAVENOUS at 10:02

## 2024-02-23 RX ADMIN — REGADENOSON 0.4 MG: 0.08 INJECTION, SOLUTION INTRAVENOUS at 10:02

## 2024-02-26 LAB
CV PHARM DOSE: 0.4 MG
CV STRESS BASE HR: 88 BPM
DIASTOLIC BLOOD PRESSURE: 80 MMHG
NUC STRESS EJECTION FRACTION: 80 %
OHS CV CPX 1 MINUTE RECOVERY HEART RATE: 101 BPM
OHS CV CPX 85 PERCENT MAX PREDICTED HEART RATE MALE: 133
OHS CV CPX MAX PREDICTED HEART RATE: 156
OHS CV CPX PATIENT IS FEMALE: 1
OHS CV CPX PATIENT IS MALE: 0
OHS CV CPX PEAK DIASTOLIC BLOOD PRESSURE: 80 MMHG
OHS CV CPX PEAK HEAR RATE: 104 BPM
OHS CV CPX PEAK RATE PRESSURE PRODUCT: ABNORMAL
OHS CV CPX PEAK SYSTOLIC BLOOD PRESSURE: 154 MMHG
OHS CV CPX PERCENT MAX PREDICTED HEART RATE ACHIEVED: 69
OHS CV CPX RATE PRESSURE PRODUCT PRESENTING: ABNORMAL
OHS CV PHARM TIME: 1016 MIN
SYSTOLIC BLOOD PRESSURE: 154 MMHG

## 2024-03-15 ENCOUNTER — OFFICE VISIT (OUTPATIENT)
Dept: FAMILY MEDICINE | Facility: CLINIC | Age: 64
End: 2024-03-15
Payer: COMMERCIAL

## 2024-03-15 ENCOUNTER — TELEPHONE (OUTPATIENT)
Dept: HEMATOLOGY/ONCOLOGY | Facility: CLINIC | Age: 64
End: 2024-03-15
Payer: COMMERCIAL

## 2024-03-15 VITALS
RESPIRATION RATE: 18 BRPM | WEIGHT: 169.44 LBS | BODY MASS INDEX: 25.1 KG/M2 | HEART RATE: 91 BPM | TEMPERATURE: 98 F | OXYGEN SATURATION: 98 % | SYSTOLIC BLOOD PRESSURE: 132 MMHG | DIASTOLIC BLOOD PRESSURE: 80 MMHG | HEIGHT: 69 IN

## 2024-03-15 DIAGNOSIS — E11.8 DIABETES MELLITUS TYPE 2 WITH COMPLICATIONS: Primary | ICD-10-CM

## 2024-03-15 DIAGNOSIS — E11.69 HYPERLIPIDEMIA ASSOCIATED WITH TYPE 2 DIABETES MELLITUS: ICD-10-CM

## 2024-03-15 DIAGNOSIS — I15.2 HYPERTENSION ASSOCIATED WITH DIABETES: ICD-10-CM

## 2024-03-15 DIAGNOSIS — E78.5 HYPERLIPIDEMIA ASSOCIATED WITH TYPE 2 DIABETES MELLITUS: ICD-10-CM

## 2024-03-15 DIAGNOSIS — E11.59 HYPERTENSION ASSOCIATED WITH DIABETES: ICD-10-CM

## 2024-03-15 DIAGNOSIS — Z12.39 ENCOUNTER FOR SCREENING FOR MALIGNANT NEOPLASM OF BREAST, UNSPECIFIED SCREENING MODALITY: ICD-10-CM

## 2024-03-15 LAB
ALBUMIN/CREAT UR: 18.9 UG/MG (ref 0–30)
CREAT UR-MCNC: 122 MG/DL (ref 15–325)
MICROALBUMIN UR DL<=1MG/L-MCNC: 23 UG/ML

## 2024-03-15 PROCEDURE — 82043 UR ALBUMIN QUANTITATIVE: CPT | Performed by: FAMILY MEDICINE

## 2024-03-15 PROCEDURE — 99214 OFFICE O/P EST MOD 30 MIN: CPT | Mod: S$GLB,,, | Performed by: FAMILY MEDICINE

## 2024-03-15 PROCEDURE — 3008F BODY MASS INDEX DOCD: CPT | Mod: CPTII,S$GLB,, | Performed by: FAMILY MEDICINE

## 2024-03-15 PROCEDURE — 1159F MED LIST DOCD IN RCRD: CPT | Mod: CPTII,S$GLB,, | Performed by: FAMILY MEDICINE

## 2024-03-15 PROCEDURE — 1160F RVW MEDS BY RX/DR IN RCRD: CPT | Mod: CPTII,S$GLB,, | Performed by: FAMILY MEDICINE

## 2024-03-15 PROCEDURE — 3079F DIAST BP 80-89 MM HG: CPT | Mod: CPTII,S$GLB,, | Performed by: FAMILY MEDICINE

## 2024-03-15 PROCEDURE — 3075F SYST BP GE 130 - 139MM HG: CPT | Mod: CPTII,S$GLB,, | Performed by: FAMILY MEDICINE

## 2024-03-15 PROCEDURE — 3044F HG A1C LEVEL LT 7.0%: CPT | Mod: CPTII,S$GLB,, | Performed by: FAMILY MEDICINE

## 2024-03-15 RX ORDER — ROSUVASTATIN CALCIUM 10 MG/1
10 TABLET, COATED ORAL DAILY
Qty: 90 TABLET | Refills: 3 | Status: SHIPPED | OUTPATIENT
Start: 2024-03-15

## 2024-03-15 NOTE — PROGRESS NOTES
Subjective:       Patient ID: Nohelia Rodriguez is a 64 y.o. female.    Chief Complaint: Follow-up    HPI  The patient is a 64-year-old who is here today for follow-up.  Overall, she is doing well.  The back pain she was experiencing at her last visit gradually improved and is no longer an issue.  She continues to work as a  at [employer].  She is able to do all of the duties of her job but she is not dealing with the shopping carts anymore.  She continues to go to the gym 3 times a week and work out at home.  She also goes to her [employer] where once a week a nurse his leading a nutrition group.  She has really learned a lot about nutrition and is trying to eat very healthy.  Of note, in 2019 she weighed 224 lb.  Today, she weighs 169 lb (which is down from our last weight together at 177).  She is continuing her efforts to live healthy, eat healthy, be active and achieve a healthy weight.    Today we discussed the followin)  Diabetes.  In January, her A1c was 6.8 which was down from prior value of 7.4 which was down from prior value of 7.5.  She is currently taking Ozempic but it is expensive.  In addition to the Ozempic, she is taking the metformin.  Her fasting sugars are usually ranging from a low of 98 to a high of 150.  She has not had any hypoglycemic episodes.  She does continue to be involved with the diabetic GRETEL.  She would be willing to do her urine sample today  2)  Hypertension.  Today blood pressure is 132/80.  She is taking lisinopril, Coreg and Norvasc which she is tolerating well  3)  hyperlipidemia.  In January, her total cholesterol was 115 and her LDL was 55.  She is taking her Crestor which she is tolerating well.    Review of Systems   Constitutional:  Negative for appetite change, chills, diaphoresis, fatigue, fever and unexpected weight change.   HENT:  Negative for congestion, ear pain, postnasal drip, rhinorrhea, sinus pressure, sneezing, sore throat and trouble swallowing.    Eyes:   Negative for pain, discharge and visual disturbance.   Respiratory:  Negative for cough, chest tightness, shortness of breath and wheezing.    Cardiovascular:  Negative for chest pain, palpitations and leg swelling.   Gastrointestinal:  Negative for abdominal distention, abdominal pain, blood in stool, constipation, diarrhea, nausea and vomiting.   Skin:  Negative for rash.         Objective:      Physical Exam  Constitutional:       General: She is not in acute distress.     Appearance: Normal appearance. She is well-developed.   HENT:      Head: Normocephalic and atraumatic.      Right Ear: Hearing, tympanic membrane, ear canal and external ear normal.      Left Ear: Hearing, tympanic membrane, ear canal and external ear normal.      Nose: Nose normal.      Mouth/Throat:      Mouth: No oral lesions.      Pharynx: No oropharyngeal exudate or posterior oropharyngeal erythema.   Eyes:      General: Lids are normal. No scleral icterus.     Extraocular Movements: Extraocular movements intact.      Conjunctiva/sclera: Conjunctivae normal.      Pupils: Pupils are equal, round, and reactive to light.   Neck:      Thyroid: No thyroid mass or thyromegaly.      Vascular: No carotid bruit.   Cardiovascular:      Rate and Rhythm: Normal rate and regular rhythm. No extrasystoles are present.     Chest Wall: PMI is not displaced.      Heart sounds: Normal heart sounds. No murmur heard.     No gallop.   Pulmonary:      Effort: Pulmonary effort is normal. No accessory muscle usage or respiratory distress.      Breath sounds: Normal breath sounds.   Abdominal:      General: Bowel sounds are normal. There is no abdominal bruit.      Palpations: Abdomen is soft.      Tenderness: There is no abdominal tenderness. There is no rebound.   Musculoskeletal:      Cervical back: Normal range of motion and neck supple.   Lymphadenopathy:      Head:      Right side of head: No submental or submandibular adenopathy.      Left side of head: No  "submental or submandibular adenopathy.      Cervical:      Right cervical: No superficial, deep or posterior cervical adenopathy.     Left cervical: No superficial, deep or posterior cervical adenopathy.      Upper Body:      Right upper body: No supraclavicular adenopathy.      Left upper body: No supraclavicular adenopathy.   Skin:     General: Skin is warm and dry.   Neurological:      Mental Status: She is alert and oriented to person, place, and time.       Blood pressure 132/80, pulse 91, temperature 98.3 °F (36.8 °C), resp. rate 18, height 5' 9" (1.753 m), weight 76.9 kg (169 lb 6.8 oz), SpO2 98 %.Body mass index is 25.02 kg/m².              A/P:  1)  Diabetes.  Improved control.  Continue with Glucophage and Ozempic.  Continue with diet exercise and weight loss.  We will do it diabetic urine test today.  2)  Hypertension.  Well controlled.  Continue with lisinopril, Coreg and Norvasc  3)  hyperlipidemia.  Well controlled.  Continue with Crestor.  Recheck fasting lipid profile in January  4) health maintenance issues.  We will schedule her mammogram.  She believes she did have her Prevnar vaccine at the pharmacy and will check on this    As long as she does well, I will see her back in 2 months for her Pap smear or sooner if needed  "

## 2024-03-15 NOTE — TELEPHONE ENCOUNTER
Pt was rescheduled from 3/15 to 4/12  due to vv not connecting. Pt informed of time/date. Pt verbalize understanding.    ----- Message from Jose Hernández sent at 3/15/2024 10:23 AM CDT -----  Name Of Caller: Nohelia        Provider Name: Yolanda Gonzales         Does patient feel the need to be seen today? no        Relationship to the Pt?: patient        Contact Preference?:[phone]          What is the nature of the call?: Patient has a virtual visit scheduled for today at 10am but she is having difficulty getting into the appointment, she wanted to notify the office of this. She is going to speak with [facility] support to assist her.

## 2024-03-28 DIAGNOSIS — Z79.4 CONTROLLED TYPE 2 DIABETES MELLITUS WITH BOTH EYES AFFECTED BY PROLIFERATIVE RETINOPATHY AND MACULAR EDEMA, WITH LONG-TERM CURRENT USE OF INSULIN: ICD-10-CM

## 2024-03-28 DIAGNOSIS — E11.3513 CONTROLLED TYPE 2 DIABETES MELLITUS WITH BOTH EYES AFFECTED BY PROLIFERATIVE RETINOPATHY AND MACULAR EDEMA, WITH LONG-TERM CURRENT USE OF INSULIN: ICD-10-CM

## 2024-03-28 RX ORDER — METFORMIN HYDROCHLORIDE 1000 MG/1
1000 TABLET ORAL 2 TIMES DAILY WITH MEALS
Qty: 180 TABLET | Refills: 1 | Status: SHIPPED | OUTPATIENT
Start: 2024-03-28

## 2024-03-28 RX ORDER — LISINOPRIL 5 MG/1
TABLET ORAL
Qty: 90 TABLET | Refills: 3 | Status: SHIPPED | OUTPATIENT
Start: 2024-03-28

## 2024-03-28 NOTE — TELEPHONE ENCOUNTER
No care due was identified.  [facility] Embedded Care Due Messages. Reference number: [id number].   3/28/2024 5:15:26 AM CDT

## 2024-03-28 NOTE — TELEPHONE ENCOUNTER
Refill Routing Note   Medication(s) are not appropriate for processing by [facility] for the following reason(s):        Drug-disease interaction    [facility] action(s):  Defer  Approve        Medication Therapy Plan: Drug-Disease: lisinopriL and Leukopenia    Pharmacist review requested: Yes     Appointments  past 12m or future 3m with PCP    Date Provider   Last Visit   3/15/2024 Katelyn Barrett MD   Next Visit   5/17/2024 Katelyn Barrett MD   ED visits in past 90 days: 0        Note composed:2:56 PM 03/28/2024

## 2024-03-28 NOTE — TELEPHONE ENCOUNTER
Refill Decision Note   Nohelia Rodriguez  is requesting a refill authorization.  Brief Assessment and Rationale for Refill:  Approve     Medication Therapy Plan:         Pharmacist review requested: Yes   Extended chart review required: Yes   Comments:     Note composed:3:31 PM 03/28/2024

## 2024-04-04 NOTE — PROGRESS NOTES
"CC: Ms. Nohelia Rodriguez arrives today for management of Type 2 DM and review of chronic medical conditions, as listed in the Visit Diagnosis section of this encounter.       HPI: Ms. Nohelia Rodriguez was diagnosed with Type 2 DM in her 40s. She did have GDM ~ 10 years prior to Type 2 DM diagnosis. She was diagnosed based on lab work. Initial treatment consisted of orals. + FH of DM in mother, 3 sisters, 2 brothers. Denies hospitalizations due to DM.     Patient was last seen by me in November. Ozempic dose was increased at that time.    BG readings are checked 1x/day.  Reports the following:   Fastin-115    Hypoglycemia: No    Missing Insulin/PO medication doses: No    Exercise: Yes - goes to gym 3 days/week.     Dietary Habits: Eats 3 meals/day. Rare snack. Avoids sugary beverages.     Last DM education appointment: 3/14/2022    She has been going to health and wellness class at her [employer]. She has found this to be very informative.         CURRENT DIABETIC MEDS: metformin 1000 mg BID, Ozempic 2 mg weekly  Vial or pen: pen  Glucometer type:     Previous DM treatments:  Glyburide  Lantus  Trulicity - availability     Last Eye Exam: 2023, + DR. Jessica Herrera. Also follows with Dr. Xavier.  Last Podiatry Exam: 2021    REVIEW OF SYSTEMS  Constitutional: no c/o fatigue, weakness. + 7# weight loss since last visit.   Cardiac: no palpitations or chest pain.  Respiratory: no cough or dyspnea.  GI: no c/o abdominal pain or nausea. Denies h/o pancreatitis.   Skin: no lesions or rashes.  Neuro: no numbness, tingling, or parasthesias.  Endocrine: denies polyphagia, polydipsia, polyuria      Personally reviewed Past Medical, Surgical, Social History.    Vital Signs  /82 (BP Location: Right arm, Patient Position: Sitting, BP Method: Large (Manual))   Pulse 82   Ht 5' 9" (1.753 m)   Wt 78 kg (171 lb 15.3 oz)   SpO2 96%   BMI 25.39 kg/m²     Personally reviewed the below labs:    Hemoglobin A1C   Date Value Ref " Range Status   04/05/2024 6.7 (H) 4.0 - 5.6 % Final     Comment:     ADA Screening Guidelines:  5.7-6.4%  Consistent with prediabetes  >or=6.5%  Consistent with diabetes    High levels of fetal hemoglobin interfere with the HbA1C  assay. Heterozygous hemoglobin variants (HbS, HgC, etc)do  not significantly interfere with this assay.   However, presence of multiple variants may affect accuracy.     01/26/2024 6.8 (H) 4.0 - 5.6 % Final     Comment:     ADA Screening Guidelines:  5.7-6.4%  Consistent with prediabetes  >or=6.5%  Consistent with diabetes    High levels of fetal hemoglobin interfere with the HbA1C  assay. Heterozygous hemoglobin variants (HbS, HgC, etc)do  not significantly interfere with this assay.   However, presence of multiple variants may affect accuracy.     11/03/2023 7.4 (H) 4.0 - 5.6 % Final     Comment:     ADA Screening Guidelines:  5.7-6.4%  Consistent with prediabetes  >or=6.5%  Consistent with diabetes    High levels of fetal hemoglobin interfere with the HbA1C  assay. Heterozygous hemoglobin variants (HbS, HgC, etc)do  not significantly interfere with this assay.   However, presence of multiple variants may affect accuracy.         Chemistry        Component Value Date/Time     04/05/2024 0807    K 4.7 04/05/2024 0807     04/05/2024 0807    CO2 22 (L) 04/05/2024 0807    BUN 16 04/05/2024 0807    CREATININE 0.9 04/05/2024 0807     04/05/2024 0807        Component Value Date/Time    CALCIUM 10.7 (H) 04/05/2024 0807    ALKPHOS 36 (L) 04/05/2024 0807    AST 23 04/05/2024 0807    ALT 16 04/05/2024 0807    BILITOT 0.3 04/05/2024 0807    ESTGFRAFRICA >60.0 06/27/2022 0954    EGFRNONAA >60.0 06/27/2022 0954          Lab Results   Component Value Date    CHOL 115 (L) 01/26/2024    CHOL 111 (L) 02/13/2023    CHOL 127 03/07/2022     Lab Results   Component Value Date    HDL 49 01/26/2024    HDL 46 02/13/2023    HDL 60 03/07/2022     Lab Results   Component Value Date    LDLCALC  55.4 (L) 01/26/2024    LDLCALC 52.2 (L) 02/13/2023    LDLCALC 52.8 (L) 03/07/2022     Lab Results   Component Value Date    TRIG 53 01/26/2024    TRIG 64 02/13/2023    TRIG 71 03/07/2022     Lab Results   Component Value Date    CHOLHDL 42.6 01/26/2024    CHOLHDL 41.4 02/13/2023    CHOLHDL 47.2 03/07/2022       Lab Results   Component Value Date    MICALBCREAT 18.9 03/15/2024     Lab Results   Component Value Date    TSH 0.570 04/05/2024       CrCl cannot be calculated (Patient's most recent lab result is older than the maximum 7 days allowed.).    Vit D, 25-Hydroxy   Date Value Ref Range Status   05/25/2019 36 30 - 96 ng/mL Final     Comment:     Vitamin D deficiency.........<10 ng/mL                              Vitamin D insufficiency......10-29 ng/mL       Vitamin D sufficiency........> or equal to 30 ng/mL  Vitamin D toxicity............>100 ng/mL           PHYSICAL EXAMINATION  Constitutional: Appears well, no distress  Respiratory: CTA, even and unlabored.  Cardiovascular: RRR, no murmurs, no carotid bruits.    GI: active bowel sounds, no hernia noted  Skin: warm and dry; no visible wounds  Neuro: oriented to person, place, time  Feet: appropriate footwear.         Goals        HEMOGLOBIN A1C < 7                 Assessment/Plan  1. Type 2 diabetes mellitus with both eyes affected by proliferative retinopathy and macular edema, with long-term current use of insulin  -- Controlled. Tolerating regimen well.   -- continue metformin, Ozempic  -- BG monitoring 1x/day. Advised her to alternate times.     -- Discussed diagnosis of DM, A1c goals, progression of disease, long term complications and tx options.  -- takes statin, ace-i   2. Primary hypertension  -- reasonable  -- continue current meds       FOLLOW UP  Follow up in about 6 months (around 10/8/2024).   Patient instructed to bring BG logs to each follow up   Patient encouraged to call for any BG/medication issues, concerns, or questions.    Orders Placed  This Encounter   Procedures    Hemoglobin A1C    Comprehensive Metabolic Panel

## 2024-04-05 ENCOUNTER — HOSPITAL ENCOUNTER (OUTPATIENT)
Dept: RADIOLOGY | Facility: HOSPITAL | Age: 64
Discharge: HOME | End: 2024-04-05
Attending: FAMILY MEDICINE
Payer: COMMERCIAL

## 2024-04-05 DIAGNOSIS — Z12.31 SCREENING MAMMOGRAM FOR HIGH-RISK PATIENT: ICD-10-CM

## 2024-04-05 DIAGNOSIS — Z12.39 ENCOUNTER FOR SCREENING FOR MALIGNANT NEOPLASM OF BREAST, UNSPECIFIED SCREENING MODALITY: ICD-10-CM

## 2024-04-05 PROCEDURE — 77067 SCR MAMMO BI INCL CAD: CPT | Mod: TC,PO

## 2024-04-05 PROCEDURE — 77063 BREAST TOMOSYNTHESIS BI: CPT | Mod: 26,,, | Performed by: RADIOLOGY

## 2024-04-05 PROCEDURE — 77067 SCR MAMMO BI INCL CAD: CPT | Mod: 26,,, | Performed by: RADIOLOGY

## 2024-04-08 ENCOUNTER — OFFICE VISIT (OUTPATIENT)
Dept: ENDOCRINOLOGY | Facility: CLINIC | Age: 64
End: 2024-04-08
Payer: COMMERCIAL

## 2024-04-08 VITALS
HEART RATE: 82 BPM | DIASTOLIC BLOOD PRESSURE: 82 MMHG | SYSTOLIC BLOOD PRESSURE: 138 MMHG | OXYGEN SATURATION: 96 % | WEIGHT: 171.94 LBS | HEIGHT: 69 IN | BODY MASS INDEX: 25.47 KG/M2

## 2024-04-08 DIAGNOSIS — I10 PRIMARY HYPERTENSION: ICD-10-CM

## 2024-04-08 DIAGNOSIS — Z79.4 TYPE 2 DIABETES MELLITUS WITH BOTH EYES AFFECTED BY PROLIFERATIVE RETINOPATHY AND MACULAR EDEMA, WITH LONG-TERM CURRENT USE OF INSULIN: Primary | ICD-10-CM

## 2024-04-08 DIAGNOSIS — E11.3513 TYPE 2 DIABETES MELLITUS WITH BOTH EYES AFFECTED BY PROLIFERATIVE RETINOPATHY AND MACULAR EDEMA, WITH LONG-TERM CURRENT USE OF INSULIN: Primary | ICD-10-CM

## 2024-04-08 PROCEDURE — 3044F HG A1C LEVEL LT 7.0%: CPT | Mod: CPTII,S$GLB,, | Performed by: NURSE PRACTITIONER

## 2024-04-08 PROCEDURE — 3008F BODY MASS INDEX DOCD: CPT | Mod: CPTII,S$GLB,, | Performed by: NURSE PRACTITIONER

## 2024-04-08 PROCEDURE — 3075F SYST BP GE 130 - 139MM HG: CPT | Mod: CPTII,S$GLB,, | Performed by: NURSE PRACTITIONER

## 2024-04-08 PROCEDURE — 3066F NEPHROPATHY DOC TX: CPT | Mod: CPTII,S$GLB,, | Performed by: NURSE PRACTITIONER

## 2024-04-08 PROCEDURE — 1159F MED LIST DOCD IN RCRD: CPT | Mod: CPTII,S$GLB,, | Performed by: NURSE PRACTITIONER

## 2024-04-08 PROCEDURE — 1160F RVW MEDS BY RX/DR IN RCRD: CPT | Mod: CPTII,S$GLB,, | Performed by: NURSE PRACTITIONER

## 2024-04-08 PROCEDURE — 99214 OFFICE O/P EST MOD 30 MIN: CPT | Mod: S$GLB,,, | Performed by: NURSE PRACTITIONER

## 2024-04-08 PROCEDURE — 4010F ACE/ARB THERAPY RXD/TAKEN: CPT | Mod: CPTII,S$GLB,, | Performed by: NURSE PRACTITIONER

## 2024-04-08 PROCEDURE — 99999 PR PBB SHADOW E&M-EST. PATIENT-LVL IV: CPT | Mod: PBBFAC,,, | Performed by: NURSE PRACTITIONER

## 2024-04-08 PROCEDURE — 3079F DIAST BP 80-89 MM HG: CPT | Mod: CPTII,S$GLB,, | Performed by: NURSE PRACTITIONER

## 2024-04-08 PROCEDURE — 3061F NEG MICROALBUMINURIA REV: CPT | Mod: CPTII,S$GLB,, | Performed by: NURSE PRACTITIONER

## 2024-04-08 RX ORDER — SEMAGLUTIDE 2.68 MG/ML
2 INJECTION, SOLUTION SUBCUTANEOUS
Qty: 3 ML | Refills: 11 | Status: SHIPPED | OUTPATIENT
Start: 2024-04-08 | End: 2025-04-08

## 2024-04-11 ENCOUNTER — PATIENT MESSAGE (OUTPATIENT)
Dept: ADMINISTRATIVE | Facility: HOSPITAL | Age: 64
End: 2024-04-11
Payer: COMMERCIAL

## 2024-04-12 ENCOUNTER — LAB VISIT (OUTPATIENT)
Dept: LAB | Facility: HOSPITAL | Age: 64
End: 2024-04-12
Attending: INTERNAL MEDICINE
Payer: COMMERCIAL

## 2024-04-12 ENCOUNTER — OFFICE VISIT (OUTPATIENT)
Dept: HEMATOLOGY/ONCOLOGY | Facility: CLINIC | Age: 64
End: 2024-04-12
Payer: COMMERCIAL

## 2024-04-12 VITALS
RESPIRATION RATE: 18 BRPM | HEART RATE: 93 BPM | TEMPERATURE: 98 F | BODY MASS INDEX: 25.34 KG/M2 | OXYGEN SATURATION: 97 % | SYSTOLIC BLOOD PRESSURE: 132 MMHG | HEIGHT: 69 IN | DIASTOLIC BLOOD PRESSURE: 71 MMHG | WEIGHT: 171.06 LBS

## 2024-04-12 DIAGNOSIS — D72.819 LEUKOPENIA, UNSPECIFIED TYPE: ICD-10-CM

## 2024-04-12 DIAGNOSIS — I10 PRIMARY HYPERTENSION: ICD-10-CM

## 2024-04-12 DIAGNOSIS — D64.9 ANEMIA, NORMOCYTIC NORMOCHROMIC: ICD-10-CM

## 2024-04-12 DIAGNOSIS — Z79.4 CONTROLLED TYPE 2 DIABETES MELLITUS WITH BOTH EYES AFFECTED BY PROLIFERATIVE RETINOPATHY AND MACULAR EDEMA, WITH LONG-TERM CURRENT USE OF INSULIN: ICD-10-CM

## 2024-04-12 DIAGNOSIS — E11.3513 CONTROLLED TYPE 2 DIABETES MELLITUS WITH BOTH EYES AFFECTED BY PROLIFERATIVE RETINOPATHY AND MACULAR EDEMA, WITH LONG-TERM CURRENT USE OF INSULIN: ICD-10-CM

## 2024-04-12 DIAGNOSIS — D64.9 ANEMIA, NORMOCYTIC NORMOCHROMIC: Primary | ICD-10-CM

## 2024-04-12 LAB
BASOPHILS # BLD AUTO: 0.03 K/UL (ref 0–0.2)
BASOPHILS NFR BLD: 1.2 % (ref 0–1.9)
DIFFERENTIAL METHOD BLD: ABNORMAL
EOSINOPHIL # BLD AUTO: 0.1 K/UL (ref 0–0.5)
EOSINOPHIL NFR BLD: 2 % (ref 0–8)
ERYTHROCYTE [DISTWIDTH] IN BLOOD BY AUTOMATED COUNT: 14.4 % (ref 11.5–14.5)
HCT VFR BLD AUTO: 28.9 % (ref 37–48.5)
HGB BLD-MCNC: 9.5 G/DL (ref 12–16)
IMM GRANULOCYTES # BLD AUTO: 0 K/UL (ref 0–0.04)
IMM GRANULOCYTES NFR BLD AUTO: 0 % (ref 0–0.5)
LYMPHOCYTES # BLD AUTO: 1.1 K/UL (ref 1–4.8)
LYMPHOCYTES NFR BLD: 45.2 % (ref 18–48)
MCH RBC QN AUTO: 28.1 PG (ref 27–31)
MCHC RBC AUTO-ENTMCNC: 32.9 G/DL (ref 32–36)
MCV RBC AUTO: 86 FL (ref 82–98)
MONOCYTES # BLD AUTO: 0.2 K/UL (ref 0.3–1)
MONOCYTES NFR BLD: 9.7 % (ref 4–15)
NEUTROPHILS # BLD AUTO: 1 K/UL (ref 1.8–7.7)
NEUTROPHILS NFR BLD: 41.9 % (ref 38–73)
NRBC BLD-RTO: 0 /100 WBC
PLATELET # BLD AUTO: 233 K/UL (ref 150–450)
PMV BLD AUTO: 9.4 FL (ref 9.2–12.9)
RBC # BLD AUTO: 3.38 M/UL (ref 4–5.4)
WBC # BLD AUTO: 2.48 K/UL (ref 3.9–12.7)

## 2024-04-12 PROCEDURE — 99213 OFFICE O/P EST LOW 20 MIN: CPT | Mod: S$GLB,,, | Performed by: INTERNAL MEDICINE

## 2024-04-12 PROCEDURE — 99999 PR PBB SHADOW E&M-EST. PATIENT-LVL IV: CPT | Mod: PBBFAC,,, | Performed by: INTERNAL MEDICINE

## 2024-04-12 PROCEDURE — 4010F ACE/ARB THERAPY RXD/TAKEN: CPT | Mod: CPTII,S$GLB,, | Performed by: INTERNAL MEDICINE

## 2024-04-12 PROCEDURE — 3008F BODY MASS INDEX DOCD: CPT | Mod: CPTII,S$GLB,, | Performed by: INTERNAL MEDICINE

## 2024-04-12 PROCEDURE — 3061F NEG MICROALBUMINURIA REV: CPT | Mod: CPTII,S$GLB,, | Performed by: INTERNAL MEDICINE

## 2024-04-12 PROCEDURE — 3078F DIAST BP <80 MM HG: CPT | Mod: CPTII,S$GLB,, | Performed by: INTERNAL MEDICINE

## 2024-04-12 PROCEDURE — 3066F NEPHROPATHY DOC TX: CPT | Mod: CPTII,S$GLB,, | Performed by: INTERNAL MEDICINE

## 2024-04-12 PROCEDURE — 3044F HG A1C LEVEL LT 7.0%: CPT | Mod: CPTII,S$GLB,, | Performed by: INTERNAL MEDICINE

## 2024-04-12 PROCEDURE — 85025 COMPLETE CBC W/AUTO DIFF WBC: CPT | Mod: PN | Performed by: INTERNAL MEDICINE

## 2024-04-12 PROCEDURE — 36415 COLL VENOUS BLD VENIPUNCTURE: CPT | Mod: PN | Performed by: INTERNAL MEDICINE

## 2024-04-12 PROCEDURE — 1160F RVW MEDS BY RX/DR IN RCRD: CPT | Mod: CPTII,S$GLB,, | Performed by: INTERNAL MEDICINE

## 2024-04-12 PROCEDURE — 1159F MED LIST DOCD IN RCRD: CPT | Mod: CPTII,S$GLB,, | Performed by: INTERNAL MEDICINE

## 2024-04-12 PROCEDURE — 3075F SYST BP GE 130 - 139MM HG: CPT | Mod: CPTII,S$GLB,, | Performed by: INTERNAL MEDICINE

## 2024-04-12 NOTE — PROGRESS NOTES
Subjective:       Name: Nohelia Rodriguez  : 1960  MRN: [id number]    Chief Complaint   Patient presents with    Anemia, normocytic normochromic      Patient is in clinic by herself    HPI: Nohelia Rodriguez is a 64 y.o. female presents to discuss the evaluation done to assess Anemia, normocytic normochromic  Patient was seen previously by  and  for leukopenia and anemia.     The patient denies any fever chills night sweats weight loss melena hematochezia hematemesis hematuria. She denies any recent history of traveling or exposure to sick people.  She is cold  natured and have more chills than night sweats.      Oncology History    No history exists.        Past Medical History:   Diagnosis Date    Diabetes mellitus     Diabetic retinopathy     s/p laser treatment    Glaucoma     History of cardiovascular stress test      normal    Hypertension     Mixed hyperlipidemia        Past Surgical History:   Procedure Laterality Date    COLONOSCOPY N/A 3/22/2019    Procedure: COLONOSCOPY;  Surgeon: Kishor Membreno MD;  Location: [facility];  Service: Endoscopy;  Laterality: N/A;    diabetic retinopathy laser         Family History   Problem Relation Name Age of Onset    Diabetes Mother      Cataracts Mother      Hypertension Mother      Diabetes Sister      Hypertension Sister      Hyperlipidemia Sister          a fib    Diabetes Brother      Hypertension Brother      Hydrocephalus Son      Diabetes Brother      Diabetes Sister      Diabetes Sister         Social History     Socioeconomic History    Marital status:    Tobacco Use    Smoking status: Never    Smokeless tobacco: Never   Substance and Sexual Activity    Alcohol use: No    Drug use: No    Sexual activity: Not Currently   Social History Narrative    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       Review of patient's allergies indicates:  No Known Allergies      ROS:  Answers submitted by the  "patient for this visit:  Review of Systems Questionnaire (Submitted on 3/15/2024)  appetite change : No  unexpected weight change: No  mouth sores: No  visual disturbance: No  cough: No  shortness of breath: No  chest pain: No  abdominal pain: No  diarrhea: No  frequency: No  back pain: No  rash: No  headaches: No  adenopathy: No  nervous/ anxious: No             Objective:     Vitals:    04/12/24 0929   BP: 132/71   Pulse: 93   Resp: 18   Temp: 97.8 °F (36.6 °C)   TempSrc: Temporal   SpO2: 97%   Weight: 77.6 kg (171 lb 1.2 oz)   Height: 5' 9" (1.753 m)          Physical Exam  Vitals reviewed.   Constitutional:       Appearance: Normal appearance.   HENT:      Head: Normocephalic and atraumatic.   Eyes:      General: No scleral icterus.     Pupils: Pupils are equal, round, and reactive to light.   Cardiovascular:      Rate and Rhythm: Normal rate and regular rhythm.      Pulses: Normal pulses.      Heart sounds: Normal heart sounds.   Pulmonary:      Effort: Pulmonary effort is normal.      Breath sounds: Normal breath sounds.   Abdominal:      General: Bowel sounds are normal. There is no distension.   Musculoskeletal:         General: No swelling.   Lymphadenopathy:      Cervical: No cervical adenopathy.   Skin:     General: Skin is warm.      Findings: No rash.   Neurological:      General: No focal deficit present.      Mental Status: She is alert and oriented to person, place, and time.      Cranial Nerves: No cranial nerve deficit.      Motor: No weakness.   Psychiatric:         Mood and Affect: Mood normal.         Behavior: Behavior normal.              Current Outpatient Medications   Medication Sig Dispense Refill    amLODIPine (NORVASC) 10 MG tablet Take 1 tablet by mouth once daily 90 tablet 3    blood sugar diagnostic Strp To check BG QD times daily, to use with insurance preferred meter 100 each 5    blood-glucose meter kit As directed 1 each 0    carvediloL (COREG) 25 MG tablet TAKE 1 TABLET BY " MOUTH TWICE DAILY WITH MEALS 180 tablet 2    lancets Misc To check BG QD times daily, to use with insurance preferred meter 100 each 5    latanoprost 0.005 % ophthalmic solution Place 1 drop into both eyes every evening.       lisinopriL (PRINIVIL,ZESTRIL) 5 MG tablet Take 1 tablet by mouth once daily 90 tablet 3    metFORMIN (GLUCOPHAGE) 1000 MG tablet TAKE 1 TABLET BY MOUTH TWICE DAILY WITH MEALS 180 tablet 1    ONETOUCH DELICA PLUS LANCET 33 gauge Misc Apply topically 3 (three) times daily.      rosuvastatin (CRESTOR) 10 MG tablet Take 1 tablet (10 mg total) by mouth once daily. 90 tablet 3    semaglutide (OZEMPIC) 2 mg/dose (8 mg/3 mL) PnIj Inject 2 mg into the skin every 7 days. 3 mL 11     No current facility-administered medications for this visit.       CBC:  Lab Results   Component Value Date    WBC 2.48 (L) 04/12/2024    HGB 9.5 (L) 04/12/2024    HCT 28.9 (L) 04/12/2024    MCV 86 04/12/2024     04/12/2024         CMP:  Sodium   Date Value Ref Range Status   04/05/2024 140 136 - 145 mmol/L Final     Potassium   Date Value Ref Range Status   04/05/2024 4.7 3.5 - 5.1 mmol/L Final     Chloride   Date Value Ref Range Status   04/05/2024 107 95 - 110 mmol/L Final     CO2   Date Value Ref Range Status   04/05/2024 22 (L) 23 - 29 mmol/L Final     Glucose   Date Value Ref Range Status   04/05/2024 109 70 - 110 mg/dL Final     BUN   Date Value Ref Range Status   04/05/2024 16 8 - 23 mg/dL Final     Creatinine   Date Value Ref Range Status   04/05/2024 0.9 0.5 - 1.4 mg/dL Final     Calcium   Date Value Ref Range Status   04/05/2024 10.7 (H) 8.7 - 10.5 mg/dL Final     Total Protein   Date Value Ref Range Status   04/05/2024 7.7 6.0 - 8.4 g/dL Final     Albumin   Date Value Ref Range Status   04/05/2024 4.1 3.5 - 5.2 g/dL Final     Total Bilirubin   Date Value Ref Range Status   04/05/2024 0.3 0.1 - 1.0 mg/dL Final     Comment:     For infants and newborns, interpretation of results should be based  on  gestational age, weight and in agreement with clinical  observations.    Premature Infant recommended reference ranges:  Up to 24 hours.............<8.0 mg/dL  Up to 48 hours............<12.0 mg/dL  3-5 days..................<15.0 mg/dL  6-29 days.................<15.0 mg/dL       Alkaline Phosphatase   Date Value Ref Range Status   04/05/2024 36 (L) 55 - 135 U/L Final     AST   Date Value Ref Range Status   04/05/2024 23 10 - 40 U/L Final     ALT   Date Value Ref Range Status   04/05/2024 16 10 - 44 U/L Final     Anion Gap   Date Value Ref Range Status   04/05/2024 11 8 - 16 mmol/L Final     eGFR if    Date Value Ref Range Status   06/27/2022 >60.0 >60 mL/min/1.73 m^2 Final     eGFR if non    Date Value Ref Range Status   06/27/2022 >60.0 >60 mL/min/1.73 m^2 Final     Comment:     Calculation used to obtain the estimated glomerular filtration  rate (eGFR) is the CKD-EPI equation.          Mammo Digital Screening Bilat w/ Jesse  Narrative: Result:  Mammo Digital Screening Bilat w/ Jesse    History:  Patient is 64 y.o. and is seen for a screening mammogram.    Films Compared:  Compared to: 04/03/2023 Mammo Digital Screening Bilat w/ Jesse, 03/07/2022   Mammo Digital Screening Bilat w/ Jesse, 03/01/2021 Mammo Digital Screening   Bilat w/ Jesse, 02/01/2020 Mammo Digital Screening Bilat w/ Jesse, and   12/28/2018 Mammo Digital Screening Bilat with Tomosynthesis_CAD     Findings:  This procedure was performed using tomosynthesis.   Computer-aided detection was utilized in the interpretation of this   examination.    The breasts have scattered areas of fibroglandular density. There is no   evidence of suspicious masses, microcalcifications or architectural   distortion.  Impression:    No mammographic evidence of malignancy.    BI-RADS Category 1: Negative    Recommendation:  Routine screening mammogram in 1 year is recommended.    Your estimated lifetime risk of breast cancer (to age 85) based  on   Tyrer-Cuzick risk assessment model is 3.14 %.  According to the American   Cancer Society, patients with a lifetime breast cancer risk of 20% or   higher might benefit from supplemental screening tests, such as screening   breast MRI.       ECOG SCORE    1 - Restricted in strenuous activity-ambulatory and able to carry out work of a light nature              Assessment/Plan:       Normocytic anemia:  -Hgb 10.2 stable found January 26, 2024  -I reviewed independently the patient medical record including her laboratory and radiologic findings.  The patient current workup is showing that she has a chronic normocytic anemia of unknown etiology.  Her labs ruled out vitamin B12 folate thyroid dysfunction as well as iron-deficiency.  - DANISHA came back negative.   -ESR LDH haptoglobin were normal ruling out hemolysisis.  - hemoglobin electrophoresis was negative for sickle cell disease or trait.  -The UA was negative of hematuria.  All workup failed to show a source that could explain normocytic anemia.  She will require a  bone marrow biopsy to assess possibly a bone marrow dysfunction.  The patient decided to proceed with close monitoring with repeat blood workup.    -she will have a repeat CBC drawn today.  Depending on the results further recommendation will be given.  -She will be seen back again in 6 months with repeat CBC CMP.  In case her hemoglobin dropped below 10 at that time will proceed with a bone marrow biopsy.    Leukopenia  - WBC 2.42 fluctuating ANC 1k on January 26, 2024 stable  -after reviewing her blood workup going to 2019 the patient has been chronically leukopenic.  This could be related ethnic variation and less likely related to blood related disorder.  The patient is asymptomatic and denies any history of recurrent infections.  We will continue to monitor this abnormality with repeat CBC.  In case this worsened a bone marrow biopsy will be ordered to assess if there is a myelodysplastic  disorder behind her chronic leukopenia.    DM type 2:  Lab Results   Component Value Date    HGBA1C 6.7 (H) 04/05/2024      -controlled by taking metformin and Ozempic.    HTN:  -controlled by taking lisinopril.                     Med Onc Chart Routing      Follow up with physician 6 months. with repeat CBC CMP. She will have blood work-up to day for CBC   Follow up with [facility]    Infusion scheduling note    Injection scheduling note    Labs    Imaging    Pharmacy appointment    Other referrals               Plan was discussed with the patient at length, and she verbalized understanding. Nohelia was given an opportunity to ask questions that were answered to her satisfaction, and she was advised to call in the interval if any problems or questions arise.  Signed:  Yolanda Gonzales MD   Hematology and Oncology  [facility] - HEMATOLOGY ONCOLOGY  [facility], [place]

## 2024-04-19 ENCOUNTER — PATIENT MESSAGE (OUTPATIENT)
Dept: FAMILY MEDICINE | Facility: CLINIC | Age: 64
End: 2024-04-19
Payer: COMMERCIAL

## 2024-05-05 RX ORDER — CARVEDILOL 25 MG/1
TABLET ORAL
Qty: 180 TABLET | Refills: 3 | Status: SHIPPED | OUTPATIENT
Start: 2024-05-05

## 2024-05-05 NOTE — TELEPHONE ENCOUNTER
Nohelia Rodriguez  is requesting a refill authorization.  Brief Assessment and Rationale for Refill:  Approve     Medication Therapy Plan:         Comments:     Note composed:9:08 AM 05/05/2024

## 2024-05-05 NOTE — TELEPHONE ENCOUNTER
No care due was identified.  [facility] Embedded Care Due Messages. Reference number: [id number].   5/05/2024 7:51:09 AM CDT

## 2024-05-17 ENCOUNTER — OFFICE VISIT (OUTPATIENT)
Dept: FAMILY MEDICINE | Facility: CLINIC | Age: 64
End: 2024-05-17
Payer: COMMERCIAL

## 2024-05-17 VITALS
OXYGEN SATURATION: 97 % | TEMPERATURE: 98 F | BODY MASS INDEX: 24.44 KG/M2 | DIASTOLIC BLOOD PRESSURE: 62 MMHG | WEIGHT: 165 LBS | SYSTOLIC BLOOD PRESSURE: 108 MMHG | RESPIRATION RATE: 18 BRPM | HEART RATE: 85 BPM | HEIGHT: 69 IN

## 2024-05-17 DIAGNOSIS — Z12.4 ENCOUNTER FOR PAPANICOLAOU SMEAR FOR CERVICAL CANCER SCREENING: Primary | ICD-10-CM

## 2024-05-17 DIAGNOSIS — E11.59 HYPERTENSION ASSOCIATED WITH DIABETES: ICD-10-CM

## 2024-05-17 DIAGNOSIS — E11.8 DIABETES MELLITUS TYPE 2 WITH COMPLICATIONS: ICD-10-CM

## 2024-05-17 DIAGNOSIS — I15.2 HYPERTENSION ASSOCIATED WITH DIABETES: ICD-10-CM

## 2024-05-17 DIAGNOSIS — E78.5 HYPERLIPIDEMIA ASSOCIATED WITH TYPE 2 DIABETES MELLITUS: ICD-10-CM

## 2024-05-17 DIAGNOSIS — Z00.00 ANNUAL PHYSICAL EXAM: ICD-10-CM

## 2024-05-17 DIAGNOSIS — E11.69 HYPERLIPIDEMIA ASSOCIATED WITH TYPE 2 DIABETES MELLITUS: ICD-10-CM

## 2024-05-17 PROCEDURE — 3066F NEPHROPATHY DOC TX: CPT | Mod: CPTII,S$GLB,, | Performed by: FAMILY MEDICINE

## 2024-05-17 PROCEDURE — 1160F RVW MEDS BY RX/DR IN RCRD: CPT | Mod: CPTII,S$GLB,, | Performed by: FAMILY MEDICINE

## 2024-05-17 PROCEDURE — 1159F MED LIST DOCD IN RCRD: CPT | Mod: CPTII,S$GLB,, | Performed by: FAMILY MEDICINE

## 2024-05-17 PROCEDURE — 3061F NEG MICROALBUMINURIA REV: CPT | Mod: CPTII,S$GLB,, | Performed by: FAMILY MEDICINE

## 2024-05-17 PROCEDURE — 88175 CYTOPATH C/V AUTO FLUID REDO: CPT | Performed by: FAMILY MEDICINE

## 2024-05-17 PROCEDURE — 3078F DIAST BP <80 MM HG: CPT | Mod: CPTII,S$GLB,, | Performed by: FAMILY MEDICINE

## 2024-05-17 PROCEDURE — 3044F HG A1C LEVEL LT 7.0%: CPT | Mod: CPTII,S$GLB,, | Performed by: FAMILY MEDICINE

## 2024-05-17 PROCEDURE — 99396 PREV VISIT EST AGE 40-64: CPT | Mod: S$GLB,,, | Performed by: FAMILY MEDICINE

## 2024-05-17 PROCEDURE — 3074F SYST BP LT 130 MM HG: CPT | Mod: CPTII,S$GLB,, | Performed by: FAMILY MEDICINE

## 2024-05-17 PROCEDURE — 87624 HPV HI-RISK TYP POOLED RSLT: CPT | Performed by: FAMILY MEDICINE

## 2024-05-17 PROCEDURE — 3008F BODY MASS INDEX DOCD: CPT | Mod: CPTII,S$GLB,, | Performed by: FAMILY MEDICINE

## 2024-05-17 PROCEDURE — 4010F ACE/ARB THERAPY RXD/TAKEN: CPT | Mod: CPTII,S$GLB,, | Performed by: FAMILY MEDICINE

## 2024-05-23 LAB
CLINICAL INFO: NORMAL
CYTO CVX: NORMAL
CYTOLOGIST CVX/VAG CYTO: NORMAL
CYTOLOGIST CVX/VAG CYTO: NORMAL
CYTOLOGY CMNT CVX/VAG CYTO-IMP: NORMAL
CYTOLOGY PAP THIN PREP EXPLANATION: NORMAL
DATE OF PREVIOUS PAP: NORMAL
DATE PREVIOUS BX: NORMAL
GEN CATEG CVX/VAG CYTO-IMP: NORMAL
HPV I/H RISK 4 DNA CVX QL NAA+PROBE: NOT DETECTED
LMP START DATE: NORMAL
MICROORGANISM CVX/VAG CYTO: NORMAL
PATHOLOGIST CVX/VAG CYTO: NORMAL
SERVICE CMNT-IMP: NORMAL
SPECIMEN SOURCE CVX/VAG CYTO: NORMAL
STAT OF ADQ CVX/VAG CYTO-IMP: NORMAL

## 2024-05-24 ENCOUNTER — PATIENT MESSAGE (OUTPATIENT)
Dept: FAMILY MEDICINE | Facility: CLINIC | Age: 64
End: 2024-05-24
Payer: COMMERCIAL

## 2024-05-26 NOTE — PROGRESS NOTES
Subjective:       Patient ID: Nohelia Rodriguez is a 64 y.o. female.    Chief Complaint: Follow-up (6 month follow up with pap)    HPI  The patient is a 64-year-old who is here today for follow-up and for an annual exam with a Pap smear.  Overall, she is doing well.  She continues to make improvements in her diet and continues to exercise regularly.  Her labs are up-to-date.  Her mammogram was normal in April.  Her colonoscopy was last done in 2019 and was normal.  Her Pap smears have all been normal in the past.  She denies any postmenopausal vaginal bleeding.  She does follow with Cardiology and Hematology    Today we discussed the followin) diabetes.  Her recent A1c was 6.7 in April.  She is currently taking Glucophage and Ozempic.  She has had her diabetic eye exam at [facility].  2) hypertension.  Today blood pressure is 108/62.  She is currently taking Norvasc, Coreg and lisinopril  3) hyperlipidemia.  She is taking her Crestor consistently.  In January her total cholesterol was 115 and her LDL was 55.          Review of Systems   Constitutional:  Negative for appetite change, chills, diaphoresis, fatigue, fever and unexpected weight change.   HENT:  Negative for congestion, dental problem, ear pain, hearing loss, postnasal drip, rhinorrhea, sneezing, sore throat and trouble swallowing.    Eyes:  Negative for photophobia, pain, discharge and visual disturbance.   Respiratory:  Negative for cough, chest tightness, shortness of breath and wheezing.    Cardiovascular:  Negative for chest pain, palpitations and leg swelling.   Gastrointestinal:  Negative for abdominal distention, abdominal pain, blood in stool, constipation, diarrhea, nausea and vomiting.   Endocrine: Negative for cold intolerance, heat intolerance, polydipsia and polyuria.   Genitourinary:  Negative for dysuria, flank pain, frequency, genital sores, hematuria, menstrual problem and vaginal discharge.   Musculoskeletal:  Negative for  arthralgias, joint swelling and myalgias.   Skin:  Negative for rash.   Neurological:  Negative for dizziness, syncope, light-headedness and headaches.   Hematological:  Negative for adenopathy. Does not bruise/bleed easily.   Psychiatric/Behavioral:  Negative for dysphoric mood, self-injury, sleep disturbance and suicidal ideas. The patient is not nervous/anxious.          Objective:      Physical Exam  Constitutional:       General: She is not in acute distress.     Appearance: Normal appearance. She is well-developed.   HENT:      Head: Normocephalic and atraumatic.      Right Ear: Hearing, tympanic membrane, ear canal and external ear normal.      Left Ear: Hearing, tympanic membrane, ear canal and external ear normal.      Nose: Nose normal.      Mouth/Throat:      Mouth: No oral lesions.      Pharynx: No oropharyngeal exudate or posterior oropharyngeal erythema.   Eyes:      General: Lids are normal. No scleral icterus.     Extraocular Movements: Extraocular movements intact.      Conjunctiva/sclera: Conjunctivae normal.      Pupils: Pupils are equal, round, and reactive to light.   Neck:      Thyroid: No thyroid mass or thyromegaly.      Vascular: No carotid bruit.   Cardiovascular:      Rate and Rhythm: Normal rate and regular rhythm. No extrasystoles are present.     Chest Wall: PMI is not displaced.      Pulses:           Dorsalis pedis pulses are 2+ on the right side and 2+ on the left side.        Posterior tibial pulses are 2+ on the right side and 2+ on the left side.      Heart sounds: Normal heart sounds. No murmur heard.     No gallop.   Pulmonary:      Effort: Pulmonary effort is normal. No accessory muscle usage or respiratory distress.      Breath sounds: Normal breath sounds.   Chest:   Breasts:     Breasts are symmetrical.      Right: Normal. No bleeding, inverted nipple, mass, nipple discharge or skin change.      Left: Normal. No bleeding, inverted nipple, mass, nipple discharge or skin  change.   Abdominal:      General: Bowel sounds are normal. There is no abdominal bruit.      Palpations: Abdomen is soft.      Tenderness: There is no abdominal tenderness. There is no rebound.   Genitourinary:     Exam position: Supine.      Labia:         Right: No rash or lesion.         Left: No rash or lesion.       Comments: External genitalia appear normal.  Speculum is inserted.  Vaginal mucosa is normal.  Cervix is visualized and appears normal.  Pap is taken.  Bimanual exam reveals no masses or tenderness.  Musculoskeletal:      Cervical back: Normal range of motion and neck supple.      Right foot: No deformity.      Left foot: No deformity.   Feet:      Right foot:      Protective Sensation: 10 sites tested.  10 sites sensed.      Skin integrity: Warmth and dry skin present. No ulcer or callus.      Left foot:      Protective Sensation: 10 sites tested.  10 sites sensed.      Skin integrity: Warmth present. No ulcer or callus.   Lymphadenopathy:      Head:      Right side of head: No submental, submandibular, preauricular, posterior auricular or occipital adenopathy.      Left side of head: No submental, submandibular, preauricular or occipital adenopathy.      Cervical: No cervical adenopathy.      Right cervical: No superficial, deep or posterior cervical adenopathy.     Left cervical: No superficial, deep or posterior cervical adenopathy.      Upper Body:      Right upper body: No supraclavicular or axillary adenopathy.      Left upper body: No supraclavicular or axillary adenopathy.   Skin:     General: Skin is warm and dry.      Nails: There is no clubbing.   Neurological:      Mental Status: She is alert and oriented to person, place, and time.      Cranial Nerves: No cranial nerve deficit.      Sensory: No sensory deficit.   Psychiatric:         Speech: Speech normal.         Behavior: Behavior normal.         Thought Content: Thought content normal.       Blood pressure 108/62, pulse 85,  "temperature 98.1 °F (36.7 °C), resp. rate 18, height 5' 9" (1.753 m), weight 74.9 kg (165 lb 0.2 oz), SpO2 97%.Body mass index is 24.37 kg/m².              A/P:  1) annual exam.  Health maintenance issues and anticipatory guidance issues were discussed.  She will continue to stay physically active and consume a healthy diet.  She will stay up-to-date with her labs and annual mammogram.  We will contact her with her Pap and HPV results.  If her Pap and HPV are normal, this will be her last Pap smear  2)  diabetes.  Well controlled.  Continue with Glucophage and Ozempic.  Recheck A1c in October  3) hypertension.  Well controlled.  Continue with Norvasc, Coreg and lisinopril  4) hyperlipidemia.  Well controlled.  Continue with Crestor.  We will check labs again in January    "

## 2024-05-27 ENCOUNTER — PATIENT OUTREACH (OUTPATIENT)
Dept: ADMINISTRATIVE | Facility: HOSPITAL | Age: 64
End: 2024-05-27
Payer: COMMERCIAL

## 2024-05-27 NOTE — LETTER
Nohelia Alexander  MRN: [id number]  : 1960  Age: 63 y.o.  Sex: female         Patient/Legal Guardian Signature  This signature was collected at 2023    NOHELIA ALEXANDER     Self  _______________________________   Printed Name/Relationship to Patient      Consent for Examination and Treatment: I hereby authorize the providers and employees of [facility] ([facility]) to provide medical treatment/services which includes, but is not limited to, performing and administering tests and diagnostic procedures that are deemed necessary, including, but not limited to, imaging examinations, blood tests and other laboratory procedures as may be required by the hospital, clinic, or may be ordered by my physician(s) or persons working under the general and/or special instructions of my physician(s).      I understand and agree that this consent covers all authorized persons, including but not limited to physicians, residents, nurse practitioners, physicians' assistants, specialists, consultants, student nurses, and independently contracted physicians, who are called upon by the physician in charge, to carry out the diagnostic procedures and medical or surgical treatment.     I hereby authorize [facility] to retain or dispose of any specimens or tissue, should there be such remaining from any test or procedure.     I hereby authorize and give consent for [facility] providers and employees to take photographs, images or videotapes of such diagnostic, surgical or treatment procedures of Patient as may be required by [facility] or as may be ordered by a physician. I further acknowledge and agree that [facility] may use cameras or other devices for patient monitoring.     I am aware that the practice of medicine is not an exact science, and I acknowledge that no guarantees have been made to me as to the outcome of any tests, procedures or treatment.     Authorization for Release of Information: I understand that my insurance company  and/or their agents may need information necessary to make determinations about payment/reimbursement. I hereby provide authorization to release to all insurance companies, their successors, assignees, other parties with whom they may have contracted, or others acting on their behalf, that are involved with payment for any hospital and/or clinic charges incurred by the patient, any information that they request and deem necessary for payment/reimbursement, and/or quality review.  I further authorize the release of my health information to physicians or other health care practitioners on staff who are involved in my health care now and in the future, and to other health care providers, entities, or institutions for the purpose of my continued care and treatment, including referrals.                              FAX      AUTHORIZATION FOR RELEASE OF   CONFIDENTIAL INFORMATION            We are seeing Nohelia Rodriguez, date of birth 1960, in the clinic at [facility]. Katelyn Barrett MD is the patient's PCP. Nohelia Rodriguez has an outstanding lab/procedure at the time we reviewed their chart. In order to help keep their health information updated, Nohelia Rodriguez has authorized us to request the following medical record(s):     ()  MAMMOGRAM (WITHIN 2 YRS)  ()  COLON/PATH W/RECALL (WITHIN 10 YRS)     ()  PAP SMEAR (WITHIN 3 YRS)      ()  OUTSIDE LAB RESULTS    ()  DEXA SCAN (WITHIN 3 YRS)      (X) DM EYE EXAM (WITHIN 48 MONTHS)          ()  FOOT EXAM (WITHIN 1yr.)          () OUTSIDE IMMUNIZATIONS    ()  OTHER                                   Please fax records to [facility] [phone].     If you have any questions, please contact Santi @ [phone].

## 2024-05-27 NOTE — PROGRESS NOTES
Population Health Chart Review & Patient Outreach Details      Additional [facility] Health Notes:               Updates Requested / Reviewed:      Updated Care Coordination Note and Immunizations Reconciliation Completed or Queried: [facility] Topics Overdue:      [facility] Score: 0     Patient is not due for any topics at this time.    RSV Vaccine                  Health Maintenance Topic(s) Outreach Outcomes & Actions Taken:    Eye Exam - Outreach Outcomes & Actions Taken  : External Records Requested & Care Team Updated if Applicable

## 2024-08-15 ENCOUNTER — OFFICE VISIT (OUTPATIENT)
Dept: OPHTHALMOLOGY | Facility: CLINIC | Age: 64
End: 2024-08-15
Payer: COMMERCIAL

## 2024-08-15 DIAGNOSIS — H35.012 RETINAL MACROANEURYSM, LEFT EYE: ICD-10-CM

## 2024-08-15 DIAGNOSIS — E11.3513 CONTROLLED TYPE 2 DIABETES MELLITUS WITH BOTH EYES AFFECTED BY PROLIFERATIVE RETINOPATHY AND MACULAR EDEMA, WITH LONG-TERM CURRENT USE OF INSULIN: Primary | ICD-10-CM

## 2024-08-15 DIAGNOSIS — Z79.4 CONTROLLED TYPE 2 DIABETES MELLITUS WITH BOTH EYES AFFECTED BY PROLIFERATIVE RETINOPATHY AND MACULAR EDEMA, WITH LONG-TERM CURRENT USE OF INSULIN: Primary | ICD-10-CM

## 2024-08-15 DIAGNOSIS — H25.13 NS (NUCLEAR SCLEROSIS), BILATERAL: ICD-10-CM

## 2024-08-15 PROCEDURE — 92014 COMPRE OPH EXAM EST PT 1/>: CPT | Mod: S$GLB,,, | Performed by: OPHTHALMOLOGY

## 2024-08-15 PROCEDURE — 1160F RVW MEDS BY RX/DR IN RCRD: CPT | Mod: CPTII,S$GLB,, | Performed by: OPHTHALMOLOGY

## 2024-08-15 PROCEDURE — 3044F HG A1C LEVEL LT 7.0%: CPT | Mod: CPTII,S$GLB,, | Performed by: OPHTHALMOLOGY

## 2024-08-15 PROCEDURE — 3066F NEPHROPATHY DOC TX: CPT | Mod: CPTII,S$GLB,, | Performed by: OPHTHALMOLOGY

## 2024-08-15 PROCEDURE — 99999 PR PBB SHADOW E&M-EST. PATIENT-LVL III: CPT | Mod: PBBFAC,,, | Performed by: OPHTHALMOLOGY

## 2024-08-15 PROCEDURE — 3061F NEG MICROALBUMINURIA REV: CPT | Mod: CPTII,S$GLB,, | Performed by: OPHTHALMOLOGY

## 2024-08-15 PROCEDURE — 92134 CPTRZ OPH DX IMG PST SGM RTA: CPT | Mod: S$GLB,,, | Performed by: OPHTHALMOLOGY

## 2024-08-15 PROCEDURE — 1159F MED LIST DOCD IN RCRD: CPT | Mod: CPTII,S$GLB,, | Performed by: OPHTHALMOLOGY

## 2024-08-15 PROCEDURE — 92201 OPSCPY EXTND RTA DRAW UNI/BI: CPT | Mod: S$GLB,,, | Performed by: OPHTHALMOLOGY

## 2024-08-15 PROCEDURE — 4010F ACE/ARB THERAPY RXD/TAKEN: CPT | Mod: CPTII,S$GLB,, | Performed by: OPHTHALMOLOGY

## 2024-08-15 NOTE — PROGRESS NOTES
HPI     Diabetic Eye Exam     Additional comments: yearly           Comments    VA stable ou, no pain ou and denies floaters or flashes ou.    Latanoprost ou qhs          Last edited by Laure Chaudhry on 8/15/2024  3:34 PM.          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 ?PDR OD  S/p light PRP OD with PSO'S  Mod NPDR OS    2. DME   OD   1/23 - trace ME with good Va    Monitor for now    OS -   A/w EMILE  S/p multiple injections with PSO'S  S/p Avastin OS x 3 with me  S/p Focal OS 5/18    Stable observe      3. HTN Ret OU  BS/BP/chol control    4. NS OU  No VS        12 months OCT

## 2024-08-16 ENCOUNTER — OFFICE VISIT (OUTPATIENT)
Dept: CARDIOLOGY | Facility: CLINIC | Age: 64
End: 2024-08-16
Attending: INTERNAL MEDICINE
Payer: COMMERCIAL

## 2024-08-16 VITALS
WEIGHT: 172.38 LBS | HEIGHT: 69 IN | HEART RATE: 83 BPM | DIASTOLIC BLOOD PRESSURE: 72 MMHG | SYSTOLIC BLOOD PRESSURE: 119 MMHG | BODY MASS INDEX: 25.53 KG/M2

## 2024-08-16 DIAGNOSIS — Z79.4 CONTROLLED TYPE 2 DIABETES MELLITUS WITH BOTH EYES AFFECTED BY PROLIFERATIVE RETINOPATHY AND MACULAR EDEMA, WITH LONG-TERM CURRENT USE OF INSULIN: ICD-10-CM

## 2024-08-16 DIAGNOSIS — E11.3513 CONTROLLED TYPE 2 DIABETES MELLITUS WITH BOTH EYES AFFECTED BY PROLIFERATIVE RETINOPATHY AND MACULAR EDEMA, WITH LONG-TERM CURRENT USE OF INSULIN: ICD-10-CM

## 2024-08-16 DIAGNOSIS — I10 PRIMARY HYPERTENSION: Primary | ICD-10-CM

## 2024-08-16 DIAGNOSIS — E78.5 HYPERLIPIDEMIA, UNSPECIFIED HYPERLIPIDEMIA TYPE: ICD-10-CM

## 2024-08-16 PROCEDURE — 99999 PR PBB SHADOW E&M-EST. PATIENT-LVL III: CPT | Mod: PBBFAC,,,

## 2024-08-16 NOTE — PROGRESS NOTES
[facility]  Date: 8/16/24    Patient: Nohelia Rodriguez, 1960, [id number]  Primary Care Provider: Katelyn Barrett MD     Chief Complaint: Follow up     Subjective:       Nohelia Rodriguez is a 64 y.o. female who presents for follow up. They follow with Dr. Sheikh.    CBC, CMP, lipid panel stable. Hgb/Hct stable at 9.5/28.9. At last visit, patient with new onset substernal chest discomfort for which nuclear stress performed. Nuclear stress without significant findings.    Since then, patient's chest pain has resolved. Average -120s at home. Denies chest discomfort, dyspnea, palpitations, dizziness, syncope, orthopnea, PND, edema.    Focused Past History includes:  Hypertension  Nuclear stress 2/2024: no ischemia or infarct, ECG portion negative for ischemia, frequent PVCs during stress  Hyperlipidemia   Type 2 diabetes mellitus   Anemia   Family history of heart disease  Mother at late age, sister around 60, unsure of father    Current Outpatient Medications   Medication Sig    amLODIPine (NORVASC) 10 MG tablet Take 1 tablet by mouth once daily    blood sugar diagnostic Strp To check BG QD times daily, to use with insurance preferred meter    blood-glucose meter kit As directed    carvediloL (COREG) 25 MG tablet TAKE 1 TABLET BY MOUTH TWICE DAILY WITH MEALS    lancets Misc To check BG QD times daily, to use with insurance preferred meter    latanoprost 0.005 % ophthalmic solution Place 1 drop into both eyes every evening.     lisinopriL (PRINIVIL,ZESTRIL) 5 MG tablet Take 1 tablet by mouth once daily    metFORMIN (GLUCOPHAGE) 1000 MG tablet TAKE 1 TABLET BY MOUTH TWICE DAILY WITH MEALS    ONETOUCH DELICA PLUS LANCET 33 gauge Misc Apply topically 3 (three) times daily.    rosuvastatin (CRESTOR) 10 MG tablet Take 1 tablet (10 mg total) by mouth once daily.    semaglutide (OZEMPIC) 2 mg/dose (8 mg/3 mL) PnIj Inject 2 mg into the skin every 7 days.     No current facility-administered  "medications for this visit.            Objective       Review of Systems  Constitutional: negative for fevers, night sweats, and weight loss  Eyes: negative for visual disturbance, diplopia  Respiratory: negative for cough, hemoptysis, sputum, and wheezing  Cardiovascular: see HPI  Gastrointestinal: negative for abdominal pain, bright red blood per rectum, change in bowel habits, dysphagia, melena, and reflux symptoms  Genitourinary:negative for dysuria, frequency, and hematuria  Hematologic/lymphatic: negative for bleeding, easy bruising, and lymphadenopathy  Musculoskeletal:negative for arthralgias, back pain, and myalgias  Neurological: negative for gait problems, paresthesia, speech problems, vertigo, and weakness  Behavioral/Psych: negative for excessive alcohol consumption, illegal drug usage, and sleep disturbance    -------------------------------------    Diabetes mellitus    Diabetic retinopathy    s/p laser treatment    Glaucoma    History of cardiovascular stress test    2/24 normal    Hypertension    Mixed hyperlipidemia     ----------------------------    Colonoscopy    Procedure: COLONOSCOPY;  Surgeon: Kishor Membreno MD;  Location: [facility];  Service: Endoscopy;  Laterality: N/A;    Diabetic retinopathy laser        Family History   Problem Relation Name Age of Onset    Diabetes Mother      Cataracts Mother      Hypertension Mother      Diabetes Sister      Hypertension Sister      Hyperlipidemia Sister          a fib    Diabetes Brother      Hypertension Brother      Hydrocephalus Son      Diabetes Brother      Diabetes Sister      Diabetes Sister       Social History     Tobacco Use    Smoking status: Never    Smokeless tobacco: Never   Substance Use Topics    Alcohol use: No    Drug use: No       Physical Exam  /72 (BP Location: Left arm, Patient Position: Sitting, BP Method: Medium (Automatic))   Pulse 83   Ht 5' 9" (1.753 m)   Wt 78.2 kg (172 lb 6.4 oz)   BMI 25.46 kg/m²   Body " surface area is 1.95 meters squared.  Body mass index is 25.46 kg/m².    General appearance: alert, appears stated age, cooperative, and no distress  Head: Normocephalic, without obvious abnormality, atraumatic  Neck: no carotid bruit, no JVD, and supple, symmetrical, trachea midline  Lungs: clear to auscultation bilaterally  Heart: regular rate and rhythm; S1, S2 normal, no murmur, click, rub or gallop  Abdomen: soft, non-tender, no distended  Extremities: extremities atraumatic, no pitting edema  Skin: warm, no cyanosis, no pathologic ecchymosis in exposed portions  Neurologic: Grossly normal. A&O x3      Lab Review   Lab Results   Component Value Date    WBC 2.48 (L) 04/12/2024    HGB 9.5 (L) 04/12/2024    HCT 28.9 (L) 04/12/2024    MCV 86 04/12/2024     04/12/2024         BMP  Lab Results   Component Value Date     04/05/2024    K 4.7 04/05/2024     04/05/2024    CO2 22 (L) 04/05/2024    BUN 16 04/05/2024    CREATININE 0.9 04/05/2024    CALCIUM 10.7 (H) 04/05/2024    ANIONGAP 11 04/05/2024    ESTGFRAFRICA >60.0 06/27/2022    EGFRNONAA >60.0 06/27/2022       Lab Results   Component Value Date    ALBUMIN 4.1 04/05/2024       Lab Results   Component Value Date    ALT 16 04/05/2024    AST 23 04/05/2024    ALKPHOS 36 (L) 04/05/2024    BILITOT 0.3 04/05/2024       Lab Results   Component Value Date    TSH 0.570 04/05/2024       Lab Results   Component Value Date    CHOL 115 (L) 01/26/2024    CHOL 111 (L) 02/13/2023    CHOL 127 03/07/2022     Lab Results   Component Value Date    HDL 49 01/26/2024    HDL 46 02/13/2023    HDL 60 03/07/2022     Lab Results   Component Value Date    LDLCALC 55.4 (L) 01/26/2024    LDLCALC 52.2 (L) 02/13/2023    LDLCALC 52.8 (L) 03/07/2022     Lab Results   Component Value Date    TRIG 53 01/26/2024    TRIG 64 02/13/2023    TRIG 71 03/07/2022     Lab Results   Component Value Date    CHOLHDL 42.6 01/26/2024    CHOLHDL 41.4 02/13/2023    CHOLHDL 47.2 03/07/2022                 Assessment & Plan:     This is a 64 y.o. female with HTN, type 2 DM. No active cardiac complaints.    1. Primary hypertension  - Well controlled  - Continue coreg 25 mg qd  - Continue lisinopril 5 mg qd  - Continue amlodipine 10 mg qd  - Maintain BP log at home    2. Hyperlipidemia   - LDL at goal  - Continue crestor 10 mg qd  - Continue routine labs per primary care    3. Type 2 diabetes mellitus   - A1c improving- currently 6.7  - Continue routine labs per primary care   - Continue metformin and semaglutide per primary care      Emphasized the importance of modifying lifestyle related risk factors including exercise, diet most resembling a Mediterranean diet.    Please follow up in 1 year or sooner if needed.      Nicky Gilliam PA-C  [facility]  Office: [phone]

## 2024-09-26 DIAGNOSIS — Z79.4 CONTROLLED TYPE 2 DIABETES MELLITUS WITH BOTH EYES AFFECTED BY PROLIFERATIVE RETINOPATHY AND MACULAR EDEMA, WITH LONG-TERM CURRENT USE OF INSULIN: ICD-10-CM

## 2024-09-26 DIAGNOSIS — E11.3513 CONTROLLED TYPE 2 DIABETES MELLITUS WITH BOTH EYES AFFECTED BY PROLIFERATIVE RETINOPATHY AND MACULAR EDEMA, WITH LONG-TERM CURRENT USE OF INSULIN: ICD-10-CM

## 2024-09-26 RX ORDER — METFORMIN HYDROCHLORIDE 1000 MG/1
1000 TABLET ORAL 2 TIMES DAILY WITH MEALS
Qty: 180 TABLET | Refills: 0 | Status: SHIPPED | OUTPATIENT
Start: 2024-09-26

## 2024-09-26 NOTE — TELEPHONE ENCOUNTER
Refill Decision Note   Nohelia Rodriguez  is requesting a refill authorization.  Brief Assessment and Rationale for Refill:  Approve     Medication Therapy Plan:  FLOS      Comments:     Note composed:4:33 PM 09/26/2024

## 2024-09-26 NOTE — TELEPHONE ENCOUNTER
Care Due:                  Date            Visit Type   Department     Provider  --------------------------------------------------------------------------------                                EP -                              PRIMARY      [facility]    Katelyn Horton Nena  Last Visit: 05-      CARE ([facility])   MEDICINE       Anger                              EP -                              PRIMARY      [facility]    Katelyn Horton Nena  Next Visit: 11-      CARE ([facility])   MEDICINE       Anger                                                            Last  Test          Frequency    Reason                     Performed    Due Date  --------------------------------------------------------------------------------    HBA1C.......  6 months...  metFORMIN................  04-   10-    [facility] Embedded Care Due Messages. Reference number: [id number].   9/26/2024 6:51:22 AM CDT

## 2024-10-04 ENCOUNTER — DOCUMENTATION ONLY (OUTPATIENT)
Dept: HEMATOLOGY/ONCOLOGY | Facility: CLINIC | Age: 64
End: 2024-10-04
Payer: COMMERCIAL

## 2024-10-04 ENCOUNTER — LAB VISIT (OUTPATIENT)
Dept: LAB | Facility: HOSPITAL | Age: 64
End: 2024-10-04
Attending: INTERNAL MEDICINE
Payer: COMMERCIAL

## 2024-10-04 DIAGNOSIS — E11.3513 TYPE 2 DIABETES MELLITUS WITH BOTH EYES AFFECTED BY PROLIFERATIVE RETINOPATHY AND MACULAR EDEMA, WITH LONG-TERM CURRENT USE OF INSULIN: ICD-10-CM

## 2024-10-04 DIAGNOSIS — Z79.4 TYPE 2 DIABETES MELLITUS WITH BOTH EYES AFFECTED BY PROLIFERATIVE RETINOPATHY AND MACULAR EDEMA, WITH LONG-TERM CURRENT USE OF INSULIN: ICD-10-CM

## 2024-10-04 DIAGNOSIS — D64.9 ANEMIA, NORMOCYTIC NORMOCHROMIC: ICD-10-CM

## 2024-10-04 DIAGNOSIS — D72.819 LEUKOPENIA, UNSPECIFIED TYPE: ICD-10-CM

## 2024-10-04 LAB
ALBUMIN SERPL BCP-MCNC: 3.9 G/DL (ref 3.5–5.2)
ALBUMIN SERPL BCP-MCNC: 3.9 G/DL (ref 3.5–5.2)
ALP SERPL-CCNC: 41 U/L (ref 55–135)
ALP SERPL-CCNC: 41 U/L (ref 55–135)
ALT SERPL W/O P-5'-P-CCNC: 15 U/L (ref 10–44)
ALT SERPL W/O P-5'-P-CCNC: 16 U/L (ref 10–44)
ANION GAP SERPL CALC-SCNC: 10 MMOL/L (ref 8–16)
ANION GAP SERPL CALC-SCNC: 9 MMOL/L (ref 8–16)
ANISOCYTOSIS BLD QL SMEAR: SLIGHT
AST SERPL-CCNC: 16 U/L (ref 10–40)
AST SERPL-CCNC: 17 U/L (ref 10–40)
BASOPHILS # BLD AUTO: ABNORMAL K/UL (ref 0–0.2)
BASOPHILS NFR BLD: 0 % (ref 0–1.9)
BILIRUB SERPL-MCNC: 0.4 MG/DL (ref 0.1–1)
BILIRUB SERPL-MCNC: 0.4 MG/DL (ref 0.1–1)
BUN SERPL-MCNC: 23 MG/DL (ref 8–23)
BUN SERPL-MCNC: 23 MG/DL (ref 8–23)
CALCIUM SERPL-MCNC: 9.5 MG/DL (ref 8.7–10.5)
CALCIUM SERPL-MCNC: 9.6 MG/DL (ref 8.7–10.5)
CHLORIDE SERPL-SCNC: 107 MMOL/L (ref 95–110)
CHLORIDE SERPL-SCNC: 108 MMOL/L (ref 95–110)
CO2 SERPL-SCNC: 24 MMOL/L (ref 23–29)
CO2 SERPL-SCNC: 24 MMOL/L (ref 23–29)
CREAT SERPL-MCNC: 1 MG/DL (ref 0.5–1.4)
CREAT SERPL-MCNC: 1 MG/DL (ref 0.5–1.4)
DIFFERENTIAL METHOD BLD: ABNORMAL
EOSINOPHIL # BLD AUTO: ABNORMAL K/UL (ref 0–0.5)
EOSINOPHIL NFR BLD: 3 % (ref 0–8)
ERYTHROCYTE [DISTWIDTH] IN BLOOD BY AUTOMATED COUNT: 13.8 % (ref 11.5–14.5)
EST. GFR  (NO RACE VARIABLE): >60 ML/MIN/1.73 M^2
EST. GFR  (NO RACE VARIABLE): >60 ML/MIN/1.73 M^2
ESTIMATED AVG GLUCOSE: 148 MG/DL (ref 68–131)
GLUCOSE SERPL-MCNC: 113 MG/DL (ref 70–110)
GLUCOSE SERPL-MCNC: 113 MG/DL (ref 70–110)
HBA1C MFR BLD: 6.8 % (ref 4–5.6)
HCT VFR BLD AUTO: 29.4 % (ref 37–48.5)
HGB BLD-MCNC: 9.5 G/DL (ref 12–16)
IMM GRANULOCYTES # BLD AUTO: ABNORMAL K/UL (ref 0–0.04)
IMM GRANULOCYTES NFR BLD AUTO: ABNORMAL % (ref 0–0.5)
LYMPHOCYTES # BLD AUTO: ABNORMAL K/UL (ref 1–4.8)
LYMPHOCYTES NFR BLD: 54 % (ref 18–48)
MCH RBC QN AUTO: 27.9 PG (ref 27–31)
MCHC RBC AUTO-ENTMCNC: 32.3 G/DL (ref 32–36)
MCV RBC AUTO: 87 FL (ref 82–98)
MONOCYTES # BLD AUTO: ABNORMAL K/UL (ref 0.3–1)
MONOCYTES NFR BLD: 6 % (ref 4–15)
NEUTROPHILS NFR BLD: 37 % (ref 38–73)
NRBC BLD-RTO: 0 /100 WBC
OVALOCYTES BLD QL SMEAR: ABNORMAL
PLATELET # BLD AUTO: 204 K/UL (ref 150–450)
PLATELET BLD QL SMEAR: ABNORMAL
PMV BLD AUTO: 9.9 FL (ref 9.2–12.9)
POIKILOCYTOSIS BLD QL SMEAR: SLIGHT
POTASSIUM SERPL-SCNC: 3.9 MMOL/L (ref 3.5–5.1)
POTASSIUM SERPL-SCNC: 3.9 MMOL/L (ref 3.5–5.1)
PROT SERPL-MCNC: 7.1 G/DL (ref 6–8.4)
PROT SERPL-MCNC: 7.1 G/DL (ref 6–8.4)
RBC # BLD AUTO: 3.4 M/UL (ref 4–5.4)
SODIUM SERPL-SCNC: 141 MMOL/L (ref 136–145)
SODIUM SERPL-SCNC: 141 MMOL/L (ref 136–145)
WBC # BLD AUTO: 1.89 K/UL (ref 3.9–12.7)

## 2024-10-04 PROCEDURE — 85007 BL SMEAR W/DIFF WBC COUNT: CPT | Mod: PN | Performed by: INTERNAL MEDICINE

## 2024-10-04 PROCEDURE — 85027 COMPLETE CBC AUTOMATED: CPT | Mod: PN | Performed by: INTERNAL MEDICINE

## 2024-10-04 PROCEDURE — 83036 HEMOGLOBIN GLYCOSYLATED A1C: CPT | Performed by: NURSE PRACTITIONER

## 2024-10-04 PROCEDURE — 80053 COMPREHEN METABOLIC PANEL: CPT | Mod: 91,PN | Performed by: NURSE PRACTITIONER

## 2024-10-04 PROCEDURE — 80053 COMPREHEN METABOLIC PANEL: CPT | Mod: PN | Performed by: INTERNAL MEDICINE

## 2024-10-06 DIAGNOSIS — E11.3513 TYPE 2 DIABETES MELLITUS WITH BOTH EYES AFFECTED BY PROLIFERATIVE RETINOPATHY AND MACULAR EDEMA, WITH LONG-TERM CURRENT USE OF INSULIN: ICD-10-CM

## 2024-10-06 DIAGNOSIS — Z79.4 TYPE 2 DIABETES MELLITUS WITH BOTH EYES AFFECTED BY PROLIFERATIVE RETINOPATHY AND MACULAR EDEMA, WITH LONG-TERM CURRENT USE OF INSULIN: ICD-10-CM

## 2024-10-08 RX ORDER — SEMAGLUTIDE 2.68 MG/ML
2 INJECTION, SOLUTION SUBCUTANEOUS
Qty: 3 ML | Refills: 6 | Status: SHIPPED | OUTPATIENT
Start: 2024-10-08

## 2024-10-11 ENCOUNTER — OFFICE VISIT (OUTPATIENT)
Dept: HEMATOLOGY/ONCOLOGY | Facility: CLINIC | Age: 64
End: 2024-10-11
Payer: COMMERCIAL

## 2024-10-11 ENCOUNTER — LAB VISIT (OUTPATIENT)
Dept: LAB | Facility: HOSPITAL | Age: 64
End: 2024-10-11
Attending: INTERNAL MEDICINE
Payer: COMMERCIAL

## 2024-10-11 VITALS
DIASTOLIC BLOOD PRESSURE: 82 MMHG | HEIGHT: 69 IN | HEART RATE: 88 BPM | BODY MASS INDEX: 25.7 KG/M2 | WEIGHT: 173.5 LBS | OXYGEN SATURATION: 100 % | SYSTOLIC BLOOD PRESSURE: 146 MMHG | TEMPERATURE: 98 F | RESPIRATION RATE: 16 BRPM

## 2024-10-11 DIAGNOSIS — D72.819 LEUKOPENIA, UNSPECIFIED TYPE: Primary | ICD-10-CM

## 2024-10-11 DIAGNOSIS — D64.9 ANEMIA, NORMOCYTIC NORMOCHROMIC: ICD-10-CM

## 2024-10-11 DIAGNOSIS — D72.819 LEUKOPENIA, UNSPECIFIED TYPE: ICD-10-CM

## 2024-10-11 LAB
ACANTHOCYTES BLD QL SMEAR: PRESENT
ANISOCYTOSIS BLD QL SMEAR: SLIGHT
BASOPHILS # BLD AUTO: 0.03 K/UL (ref 0–0.2)
BASOPHILS NFR BLD: 1.4 % (ref 0–1.9)
DIFFERENTIAL METHOD BLD: ABNORMAL
EOSINOPHIL # BLD AUTO: 0.1 K/UL (ref 0–0.5)
EOSINOPHIL NFR BLD: 2.8 % (ref 0–8)
ERYTHROCYTE [DISTWIDTH] IN BLOOD BY AUTOMATED COUNT: 13.7 % (ref 11.5–14.5)
ERYTHROCYTE [SEDIMENTATION RATE] IN BLOOD BY PHOTOMETRIC METHOD: 26 MM/HR (ref 0–36)
FERRITIN SERPL-MCNC: 84 NG/ML (ref 20–300)
FOLATE SERPL-MCNC: 39.2 NG/ML (ref 4–24)
HAPTOGLOB SERPL-MCNC: 153 MG/DL (ref 30–250)
HCT VFR BLD AUTO: 30.7 % (ref 37–48.5)
HGB BLD-MCNC: 10.1 G/DL (ref 12–16)
HYPOCHROMIA BLD QL SMEAR: ABNORMAL
IMM GRANULOCYTES # BLD AUTO: 0 K/UL (ref 0–0.04)
IMM GRANULOCYTES NFR BLD AUTO: 0 % (ref 0–0.5)
IRON SERPL-MCNC: 73 UG/DL (ref 30–160)
LDH SERPL L TO P-CCNC: 154 U/L (ref 110–260)
LYMPHOCYTES # BLD AUTO: 1.2 K/UL (ref 1–4.8)
LYMPHOCYTES NFR BLD: 54.2 % (ref 18–48)
MCH RBC QN AUTO: 28.1 PG (ref 27–31)
MCHC RBC AUTO-ENTMCNC: 32.9 G/DL (ref 32–36)
MCV RBC AUTO: 86 FL (ref 82–98)
MONOCYTES # BLD AUTO: 0.2 K/UL (ref 0.3–1)
MONOCYTES NFR BLD: 9.7 % (ref 4–15)
NEUTROPHILS # BLD AUTO: 0.7 K/UL (ref 1.8–7.7)
NEUTROPHILS NFR BLD: 31.9 % (ref 38–73)
NRBC BLD-RTO: 0 /100 WBC
OVALOCYTES BLD QL SMEAR: ABNORMAL
PLATELET # BLD AUTO: 213 K/UL (ref 150–450)
PLATELET BLD QL SMEAR: ABNORMAL
PMV BLD AUTO: 10.1 FL (ref 9.2–12.9)
POIKILOCYTOSIS BLD QL SMEAR: SLIGHT
RBC # BLD AUTO: 3.59 M/UL (ref 4–5.4)
SATURATED IRON: 16 % (ref 20–50)
SCHISTOCYTES BLD QL SMEAR: ABNORMAL
T4 FREE SERPL-MCNC: 0.94 NG/DL (ref 0.71–1.51)
TOTAL IRON BINDING CAPACITY: 443 UG/DL (ref 250–450)
TRANSFERRIN SERPL-MCNC: 299 MG/DL (ref 200–375)
TSH SERPL DL<=0.005 MIU/L-ACNC: 0.45 UIU/ML (ref 0.4–4)
VIT B12 SERPL-MCNC: 350 PG/ML (ref 210–950)
WBC # BLD AUTO: 2.16 K/UL (ref 3.9–12.7)

## 2024-10-11 PROCEDURE — 3077F SYST BP >= 140 MM HG: CPT | Mod: CPTII,S$GLB,, | Performed by: INTERNAL MEDICINE

## 2024-10-11 PROCEDURE — 3061F NEG MICROALBUMINURIA REV: CPT | Mod: CPTII,S$GLB,, | Performed by: INTERNAL MEDICINE

## 2024-10-11 PROCEDURE — 84165 PROTEIN E-PHORESIS SERUM: CPT | Mod: 26,,, | Performed by: PATHOLOGY

## 2024-10-11 PROCEDURE — 1159F MED LIST DOCD IN RCRD: CPT | Mod: CPTII,S$GLB,, | Performed by: INTERNAL MEDICINE

## 2024-10-11 PROCEDURE — 86038 ANTINUCLEAR ANTIBODIES: CPT | Performed by: INTERNAL MEDICINE

## 2024-10-11 PROCEDURE — 84165 PROTEIN E-PHORESIS SERUM: CPT | Performed by: INTERNAL MEDICINE

## 2024-10-11 PROCEDURE — 99999 PR PBB SHADOW E&M-EST. PATIENT-LVL IV: CPT | Mod: PBBFAC,,, | Performed by: INTERNAL MEDICINE

## 2024-10-11 PROCEDURE — 3079F DIAST BP 80-89 MM HG: CPT | Mod: CPTII,S$GLB,, | Performed by: INTERNAL MEDICINE

## 2024-10-11 PROCEDURE — 84443 ASSAY THYROID STIM HORMONE: CPT | Performed by: INTERNAL MEDICINE

## 2024-10-11 PROCEDURE — 85652 RBC SED RATE AUTOMATED: CPT | Performed by: INTERNAL MEDICINE

## 2024-10-11 PROCEDURE — 4010F ACE/ARB THERAPY RXD/TAKEN: CPT | Mod: CPTII,S$GLB,, | Performed by: INTERNAL MEDICINE

## 2024-10-11 PROCEDURE — 84466 ASSAY OF TRANSFERRIN: CPT | Performed by: INTERNAL MEDICINE

## 2024-10-11 PROCEDURE — 1160F RVW MEDS BY RX/DR IN RCRD: CPT | Mod: CPTII,S$GLB,, | Performed by: INTERNAL MEDICINE

## 2024-10-11 PROCEDURE — 83921 ORGANIC ACID SINGLE QUANT: CPT | Performed by: INTERNAL MEDICINE

## 2024-10-11 PROCEDURE — 86334 IMMUNOFIX E-PHORESIS SERUM: CPT | Mod: 26,,, | Performed by: PATHOLOGY

## 2024-10-11 PROCEDURE — 84439 ASSAY OF FREE THYROXINE: CPT | Performed by: INTERNAL MEDICINE

## 2024-10-11 PROCEDURE — 83010 ASSAY OF HAPTOGLOBIN QUANT: CPT | Performed by: INTERNAL MEDICINE

## 2024-10-11 PROCEDURE — 3008F BODY MASS INDEX DOCD: CPT | Mod: CPTII,S$GLB,, | Performed by: INTERNAL MEDICINE

## 2024-10-11 PROCEDURE — 99214 OFFICE O/P EST MOD 30 MIN: CPT | Mod: S$GLB,,, | Performed by: INTERNAL MEDICINE

## 2024-10-11 PROCEDURE — 82746 ASSAY OF FOLIC ACID SERUM: CPT | Performed by: INTERNAL MEDICINE

## 2024-10-11 PROCEDURE — G2211 COMPLEX E/M VISIT ADD ON: HCPCS | Mod: S$GLB,,, | Performed by: INTERNAL MEDICINE

## 2024-10-11 PROCEDURE — 83615 LACTATE (LD) (LDH) ENZYME: CPT | Mod: PN | Performed by: INTERNAL MEDICINE

## 2024-10-11 PROCEDURE — 3044F HG A1C LEVEL LT 7.0%: CPT | Mod: CPTII,S$GLB,, | Performed by: INTERNAL MEDICINE

## 2024-10-11 PROCEDURE — 86334 IMMUNOFIX E-PHORESIS SERUM: CPT | Performed by: INTERNAL MEDICINE

## 2024-10-11 PROCEDURE — 83540 ASSAY OF IRON: CPT | Performed by: INTERNAL MEDICINE

## 2024-10-11 PROCEDURE — 3066F NEPHROPATHY DOC TX: CPT | Mod: CPTII,S$GLB,, | Performed by: INTERNAL MEDICINE

## 2024-10-11 PROCEDURE — 82607 VITAMIN B-12: CPT | Performed by: INTERNAL MEDICINE

## 2024-10-11 PROCEDURE — 85025 COMPLETE CBC W/AUTO DIFF WBC: CPT | Mod: PN | Performed by: INTERNAL MEDICINE

## 2024-10-11 PROCEDURE — 82728 ASSAY OF FERRITIN: CPT | Performed by: INTERNAL MEDICINE

## 2024-10-11 NOTE — PROGRESS NOTES
Subjective:       Name: Nohelia Rodriguez  : 1960  MRN: [id number]    Chief Complaint   Patient presents with    Follow-up     Anemia, normocytic normochromic      Patient is in clinic by herself    HPI: Nohelia Rodriguez is a 64 y.o. female presents to discuss the evaluation done to assess Follow-up (Anemia, normocytic normochromic)  Patient was seen previously by  and  for leukopenia and anemia.     The patient denies any fever chills night sweats weight loss melena hematochezia hematemesis hematuria. She denies any recent history of traveling or exposure to sick people.  She is cold  natured and have more chills than night sweats.      Oncology History    No history exists.        Past Medical History:   Diagnosis Date    Diabetes mellitus     Diabetic retinopathy     s/p laser treatment    Glaucoma     History of cardiovascular stress test      normal    Hypertension     Mixed hyperlipidemia        Past Surgical History:   Procedure Laterality Date    COLONOSCOPY N/A 3/22/2019    Procedure: COLONOSCOPY;  Surgeon: Kishor Membreno MD;  Location: [facility];  Service: Endoscopy;  Laterality: N/A;    diabetic retinopathy laser         Family History   Problem Relation Name Age of Onset    Diabetes Mother      Cataracts Mother      Hypertension Mother      Diabetes Sister      Hypertension Sister      Hyperlipidemia Sister          a fib    Diabetes Brother      Hypertension Brother      Hydrocephalus Son      Diabetes Brother      Diabetes Sister      Diabetes Sister         Social History     Socioeconomic History    Marital status:    Tobacco Use    Smoking status: Never    Smokeless tobacco: Never   Substance and Sexual Activity    Alcohol use: No    Drug use: No    Sexual activity: Not Currently   Social History Narrative    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     Social Drivers of Health     Financial Resource Strain: Medium Risk (2024)    Overall Financial  "Resource Strain (CARDIA)     Difficulty of Paying Living Expenses: Somewhat hard   Food Insecurity: Food Insecurity Present (8/9/2024)    Hunger Vital Sign     Worried About Running Out of Food in the Last Year: Sometimes true     Ran Out of Food in the Last Year: Never true   Housing Stability: Unknown (8/9/2024)    Housing Stability Vital Sign     Unable to Pay for Housing in the Last Year: No       Review of patient's allergies indicates:  No Known Allergies      ROS:  Answers submitted by the patient for this visit:  Review of Systems Questionnaire (Submitted on 3/15/2024)  appetite change : No  unexpected weight change: No  mouth sores: No  visual disturbance: No  cough: No  shortness of breath: No  chest pain: No  abdominal pain: No  diarrhea: No  frequency: No  back pain: No  rash: No  headaches: No  adenopathy: No  nervous/ anxious: No             Objective:     Vitals:    10/11/24 1020   BP: (!) 146/82   Pulse: 88   Resp: 16   Temp: 97.8 °F (36.6 °C)   TempSrc: Temporal   SpO2: 100%   Weight: 78.7 kg (173 lb 8 oz)   Height: 5' 9" (1.753 m)          Physical Exam  Vitals reviewed.   Constitutional:       Appearance: Normal appearance.   HENT:      Head: Normocephalic and atraumatic.   Eyes:      General: No scleral icterus.     Pupils: Pupils are equal, round, and reactive to light.   Cardiovascular:      Rate and Rhythm: Normal rate and regular rhythm.      Pulses: Normal pulses.      Heart sounds: Normal heart sounds.   Pulmonary:      Effort: Pulmonary effort is normal.      Breath sounds: Normal breath sounds.   Abdominal:      General: Bowel sounds are normal. There is no distension.   Musculoskeletal:         General: No swelling.   Lymphadenopathy:      Cervical: No cervical adenopathy.   Skin:     General: Skin is warm.      Findings: No rash.   Neurological:      General: No focal deficit present.      Mental Status: She is alert and oriented to person, place, and time.      Cranial Nerves: No " cranial nerve deficit.      Motor: No weakness.   Psychiatric:         Mood and Affect: Mood normal.         Behavior: Behavior normal.                Current Outpatient Medications on File Prior to Visit   Medication Sig    amLODIPine (NORVASC) 10 MG tablet Take 1 tablet by mouth once daily    blood sugar diagnostic Strp To check BG QD times daily, to use with insurance preferred meter    blood-glucose meter kit As directed    carvediloL (COREG) 25 MG tablet TAKE 1 TABLET BY MOUTH TWICE DAILY WITH MEALS    latanoprost 0.005 % ophthalmic solution Place 1 drop into both eyes every evening.     lisinopriL (PRINIVIL,ZESTRIL) 5 MG tablet Take 1 tablet by mouth once daily    metFORMIN (GLUCOPHAGE) 1000 MG tablet TAKE 1 TABLET BY MOUTH TWICE DAILY WITH MEALS    ONETOUCH DELICA PLUS LANCET 33 gauge Misc Apply topically 3 (three) times daily.    rosuvastatin (CRESTOR) 10 MG tablet Take 1 tablet (10 mg total) by mouth once daily.    semaglutide (OZEMPIC) 2 mg/dose (8 mg/3 mL) PnIj Inject 2 mg into the skin every 7 days.    [DISCONTINUED] semaglutide (OZEMPIC) 2 mg/dose (8 mg/3 mL) PnIj Inject 2 mg into the skin every 7 days.    [DISCONTINUED] lancets Misc To check BG QD times daily, to use with insurance preferred meter (Patient not taking: Reported on 10/11/2024)     No current facility-administered medications on file prior to visit.       CBC:  Lab Results   Component Value Date    WBC 1.89 (LL) 10/04/2024    HGB 9.5 (L) 10/04/2024    HCT 29.4 (L) 10/04/2024    MCV 87 10/04/2024     10/04/2024         CMP:  Sodium   Date Value Ref Range Status   10/04/2024 141 136 - 145 mmol/L Final   10/04/2024 141 136 - 145 mmol/L Final     Potassium   Date Value Ref Range Status   10/04/2024 3.9 3.5 - 5.1 mmol/L Final   10/04/2024 3.9 3.5 - 5.1 mmol/L Final     Chloride   Date Value Ref Range Status   10/04/2024 108 95 - 110 mmol/L Final   10/04/2024 107 95 - 110 mmol/L Final     CO2   Date Value Ref Range Status   10/04/2024 24  23 - 29 mmol/L Final   10/04/2024 24 23 - 29 mmol/L Final     Glucose   Date Value Ref Range Status   10/04/2024 113 (H) 70 - 110 mg/dL Final   10/04/2024 113 (H) 70 - 110 mg/dL Final     BUN   Date Value Ref Range Status   10/04/2024 23 8 - 23 mg/dL Final   10/04/2024 23 8 - 23 mg/dL Final     Creatinine   Date Value Ref Range Status   10/04/2024 1.0 0.5 - 1.4 mg/dL Final   10/04/2024 1.0 0.5 - 1.4 mg/dL Final     Calcium   Date Value Ref Range Status   10/04/2024 9.6 8.7 - 10.5 mg/dL Final   10/04/2024 9.5 8.7 - 10.5 mg/dL Final     Total Protein   Date Value Ref Range Status   10/04/2024 7.1 6.0 - 8.4 g/dL Final   10/04/2024 7.1 6.0 - 8.4 g/dL Final     Albumin   Date Value Ref Range Status   10/04/2024 3.9 3.5 - 5.2 g/dL Final   10/04/2024 3.9 3.5 - 5.2 g/dL Final     Total Bilirubin   Date Value Ref Range Status   10/04/2024 0.4 0.1 - 1.0 mg/dL Final     Comment:     For infants and newborns, interpretation of results should be based  on gestational age, weight and in agreement with clinical  observations.    Premature Infant recommended reference ranges:  Up to 24 hours.............<8.0 mg/dL  Up to 48 hours............<12.0 mg/dL  3-5 days..................<15.0 mg/dL  6-29 days.................<15.0 mg/dL     10/04/2024 0.4 0.1 - 1.0 mg/dL Final     Comment:     For infants and newborns, interpretation of results should be based  on gestational age, weight and in agreement with clinical  observations.    Premature Infant recommended reference ranges:  Up to 24 hours.............<8.0 mg/dL  Up to 48 hours............<12.0 mg/dL  3-5 days..................<15.0 mg/dL  6-29 days.................<15.0 mg/dL       Alkaline Phosphatase   Date Value Ref Range Status   10/04/2024 41 (L) 55 - 135 U/L Final   10/04/2024 41 (L) 55 - 135 U/L Final     AST   Date Value Ref Range Status   10/04/2024 17 10 - 40 U/L Final   10/04/2024 16 10 - 40 U/L Final     ALT   Date Value Ref Range Status   10/04/2024 16 10 - 44 U/L  Final   10/04/2024 15 10 - 44 U/L Final     Anion Gap   Date Value Ref Range Status   10/04/2024 9 8 - 16 mmol/L Final   10/04/2024 10 8 - 16 mmol/L Final     eGFR if    Date Value Ref Range Status   06/27/2022 >60.0 >60 mL/min/1.73 m^2 Final     eGFR if non    Date Value Ref Range Status   06/27/2022 >60.0 >60 mL/min/1.73 m^2 Final     Comment:     Calculation used to obtain the estimated glomerular filtration  rate (eGFR) is the CKD-EPI equation.          Posterior Segment OCT Retina-Both eyes  Posterior segment exam performed with 90D and 28D       ECOG SCORE    1 - Restricted in strenuous activity-ambulatory and able to carry out work of a light nature              Assessment/Plan:       Normocytic anemia:  -hemoglobin 9.5 MCV 87 on October 4, 2024.  -Hgb 10.2 stable found January 26, 2024  -I reviewed independently the patient medical record including her laboratory and radiologic findings.  The patient current workup is showing that she has a chronic normocytic anemia of unknown etiology.  Her labs ruled out vitamin B12 folate thyroid dysfunction as well as iron-deficiency.  - DANISHA came back negative.   -ESR LDH haptoglobin were normal ruling out hemolysisis.  - hemoglobin electrophoresis was negative for sickle cell disease or trait.  -The UA was negative of hematuria.  All workup failed to show a source that could explain normocytic anemia.  She will require a  bone marrow biopsy to assess possibly a bone marrow dysfunction.  The patient decided to proceed with close monitoring with repeat blood workup.    -she will have blood workup drawn today for CBC CMP ESR LDH iron studies vitamin B12 SPEP with immunofixation TSH free T4 DANISHA haptoglobin methylmalonic acid homocystine level  -She will be seen back again in 2 months with repeat CBC CMP.  In case her hemoglobin dropped below 10 at that time will proceed with a bone marrow biopsy.    Leukopenia  -WBC 1.89 with 37% granulocytes  and 54% lymphocytes.  - WBC 2.42 fluctuating ANC 1k on January 26, 2024 stable  -after reviewing her blood workup going to 2019 the patient has been chronically leukopenic.  This could be related ethnic variation and less likely related to blood related disorder.  The patient is asymptomatic and denies any history of recurrent infections.  We will continue to monitor this abnormality with repeat CBC.  In case this worsened a bone marrow biopsy will be ordered to assess if there is a myelodysplastic disorder behind her chronic leukopenia.    DM type 2:  Lab Results   Component Value Date    HGBA1C 6.8 (H) 10/04/2024      -controlled by taking metformin and Ozempic.    HTN:  -controlled by taking Norvasc.                     Med Onc Chart Routing      Follow up with physician 2 months. with repeat CBC CMP. She will have labs drawn today as below   Follow up with [facility]    Infusion scheduling note    Injection scheduling note    Labs CBC, pathologist interp. differential, free T4, LDH, SPEP, vitamin B12, TSH, folate, ferritin and iron and TIBC   Scheduling:  Preferred lab:  Lab interval:  if, haptoglobin,methylmalonic acid, homocystein, DANISHA, ESR   Imaging    Pharmacy appointment    Other referrals                   Plan was discussed with the patient at length, and she verbalized understanding. Nohelia was given an opportunity to ask questions that were answered to her satisfaction, and she was advised to call in the interval if any problems or questions arise.  Signed:  Yolanda Gonzales MD   Hematology and Oncology  [facility] - HEMATOLOGY ONCOLOGY  [facility], [place]

## 2024-10-14 ENCOUNTER — OFFICE VISIT (OUTPATIENT)
Dept: ENDOCRINOLOGY | Facility: CLINIC | Age: 64
End: 2024-10-14
Payer: COMMERCIAL

## 2024-10-14 VITALS
BODY MASS INDEX: 26.01 KG/M2 | SYSTOLIC BLOOD PRESSURE: 130 MMHG | DIASTOLIC BLOOD PRESSURE: 64 MMHG | HEART RATE: 81 BPM | WEIGHT: 175.63 LBS | HEIGHT: 69 IN

## 2024-10-14 DIAGNOSIS — E11.3513 TYPE 2 DIABETES MELLITUS WITH BOTH EYES AFFECTED BY PROLIFERATIVE RETINOPATHY AND MACULAR EDEMA, WITH LONG-TERM CURRENT USE OF INSULIN: Primary | ICD-10-CM

## 2024-10-14 DIAGNOSIS — E11.3513 TYPE 2 DIABETES MELLITUS WITH BOTH EYES AFFECTED BY PROLIFERATIVE RETINOPATHY AND MACULAR EDEMA, WITH LONG-TERM CURRENT USE OF INSULIN: ICD-10-CM

## 2024-10-14 DIAGNOSIS — Z79.4 TYPE 2 DIABETES MELLITUS WITH BOTH EYES AFFECTED BY PROLIFERATIVE RETINOPATHY AND MACULAR EDEMA, WITH LONG-TERM CURRENT USE OF INSULIN: ICD-10-CM

## 2024-10-14 DIAGNOSIS — I10 PRIMARY HYPERTENSION: ICD-10-CM

## 2024-10-14 DIAGNOSIS — Z79.4 TYPE 2 DIABETES MELLITUS WITH BOTH EYES AFFECTED BY PROLIFERATIVE RETINOPATHY AND MACULAR EDEMA, WITH LONG-TERM CURRENT USE OF INSULIN: Primary | ICD-10-CM

## 2024-10-14 LAB
ALBUMIN SERPL ELPH-MCNC: 4.36 G/DL (ref 3.35–5.55)
ALPHA1 GLOB SERPL ELPH-MCNC: 0.24 G/DL (ref 0.17–0.41)
ALPHA2 GLOB SERPL ELPH-MCNC: 0.7 G/DL (ref 0.43–0.99)
ANA SER QL IF: NORMAL
B-GLOBULIN SERPL ELPH-MCNC: 0.89 G/DL (ref 0.5–1.1)
GAMMA GLOB SERPL ELPH-MCNC: 1.31 G/DL (ref 0.67–1.58)
INTERPRETATION SERPL IFE-IMP: NORMAL
PROT SERPL-MCNC: 7.5 G/DL (ref 6–8.4)

## 2024-10-14 PROCEDURE — 99999 PR PBB SHADOW E&M-EST. PATIENT-LVL IV: CPT | Mod: PBBFAC,,, | Performed by: NURSE PRACTITIONER

## 2024-10-14 PROCEDURE — 4010F ACE/ARB THERAPY RXD/TAKEN: CPT | Mod: CPTII,S$GLB,, | Performed by: NURSE PRACTITIONER

## 2024-10-14 PROCEDURE — 3008F BODY MASS INDEX DOCD: CPT | Mod: CPTII,S$GLB,, | Performed by: NURSE PRACTITIONER

## 2024-10-14 PROCEDURE — 3061F NEG MICROALBUMINURIA REV: CPT | Mod: CPTII,S$GLB,, | Performed by: NURSE PRACTITIONER

## 2024-10-14 PROCEDURE — 3075F SYST BP GE 130 - 139MM HG: CPT | Mod: CPTII,S$GLB,, | Performed by: NURSE PRACTITIONER

## 2024-10-14 PROCEDURE — 1160F RVW MEDS BY RX/DR IN RCRD: CPT | Mod: CPTII,S$GLB,, | Performed by: NURSE PRACTITIONER

## 2024-10-14 PROCEDURE — 1159F MED LIST DOCD IN RCRD: CPT | Mod: CPTII,S$GLB,, | Performed by: NURSE PRACTITIONER

## 2024-10-14 PROCEDURE — 3044F HG A1C LEVEL LT 7.0%: CPT | Mod: CPTII,S$GLB,, | Performed by: NURSE PRACTITIONER

## 2024-10-14 PROCEDURE — G2211 COMPLEX E/M VISIT ADD ON: HCPCS | Mod: S$GLB,,, | Performed by: NURSE PRACTITIONER

## 2024-10-14 PROCEDURE — 3078F DIAST BP <80 MM HG: CPT | Mod: CPTII,S$GLB,, | Performed by: NURSE PRACTITIONER

## 2024-10-14 PROCEDURE — 3066F NEPHROPATHY DOC TX: CPT | Mod: CPTII,S$GLB,, | Performed by: NURSE PRACTITIONER

## 2024-10-14 PROCEDURE — 99214 OFFICE O/P EST MOD 30 MIN: CPT | Mod: S$GLB,,, | Performed by: NURSE PRACTITIONER

## 2024-10-14 NOTE — PROGRESS NOTES
"CC: Ms. Nohelia Rodriguez arrives today for management of Type 2 DM and review of chronic medical conditions, as listed in the Visit Diagnosis section of this encounter.       HPI: Ms. Nohelia Rodriguez was diagnosed with Type 2 DM in her 40s. She did have GDM ~ 10 years prior to Type 2 DM diagnosis. She was diagnosed based on lab work. Initial treatment consisted of orals. + FH of DM in mother, 3 sisters, 2 brothers. Denies hospitalizations due to DM.     Patient was last seen by me in April.    BG readings are checked 1x/day.  Reports the following:   Fastin-130    Hypoglycemia: No    Missing Insulin/PO medication doses: No    Exercise: Yes - goes to gym 3 days/week.     Dietary Habits: Eats 3 meals/day. Rare snack. Avoids sugary beverages.     Last DM education appointment: 3/14/2022      CURRENT DIABETIC MEDS: metformin 1000 mg BID, Ozempic 2 mg weekly  Vial or pen: pen  Glucometer type:     Previous DM treatments:  Glyburide  Lantus  Trulicity - availability     Last Eye Exam: 8/15/2024, + DR. Jessica Herrera. Also follows annually with Dr. Xavier.  Last Podiatry Exam: 2021    REVIEW OF SYSTEMS  Constitutional: no c/o fatigue, weakness, weight loss. + 4 # weight gain.  Cardiac: no palpitations or chest pain.  Respiratory: no cough or dyspnea.  GI: no c/o abdominal pain or nausea. Denies h/o pancreatitis.   Skin: no lesions or rashes.  Neuro: no numbness, tingling, or parasthesias.  Endocrine: denies polyphagia, polydipsia, polyuria      Personally reviewed Past Medical, Surgical, Social History    Vital Signs  /64   Pulse 81   Ht 5' 9" (1.753 m)   Wt 79.6 kg (175 lb 9.5 oz)   BMI 25.93 kg/m²     Personally reviewed the below labs:    Hemoglobin A1C   Date Value Ref Range Status   10/04/2024 6.8 (H) 4.0 - 5.6 % Final     Comment:     ADA Screening Guidelines:  5.7-6.4%  Consistent with prediabetes  >or=6.5%  Consistent with diabetes    High levels of fetal hemoglobin interfere with the HbA1C  assay. " Heterozygous hemoglobin variants (HbS, HgC, etc)do  not significantly interfere with this assay.   However, presence of multiple variants may affect accuracy.     04/05/2024 6.7 (H) 4.0 - 5.6 % Final     Comment:     ADA Screening Guidelines:  5.7-6.4%  Consistent with prediabetes  >or=6.5%  Consistent with diabetes    High levels of fetal hemoglobin interfere with the HbA1C  assay. Heterozygous hemoglobin variants (HbS, HgC, etc)do  not significantly interfere with this assay.   However, presence of multiple variants may affect accuracy.     01/26/2024 6.8 (H) 4.0 - 5.6 % Final     Comment:     ADA Screening Guidelines:  5.7-6.4%  Consistent with prediabetes  >or=6.5%  Consistent with diabetes    High levels of fetal hemoglobin interfere with the HbA1C  assay. Heterozygous hemoglobin variants (HbS, HgC, etc)do  not significantly interfere with this assay.   However, presence of multiple variants may affect accuracy.         Chemistry        Component Value Date/Time     10/04/2024 0851     10/04/2024 0851    K 3.9 10/04/2024 0851    K 3.9 10/04/2024 0851     10/04/2024 0851     10/04/2024 0851    CO2 24 10/04/2024 0851    CO2 24 10/04/2024 0851    BUN 23 10/04/2024 0851    BUN 23 10/04/2024 0851    CREATININE 1.0 10/04/2024 0851    CREATININE 1.0 10/04/2024 0851     (H) 10/04/2024 0851     (H) 10/04/2024 0851        Component Value Date/Time    CALCIUM 9.6 10/04/2024 0851    CALCIUM 9.5 10/04/2024 0851    ALKPHOS 41 (L) 10/04/2024 0851    ALKPHOS 41 (L) 10/04/2024 0851    AST 17 10/04/2024 0851    AST 16 10/04/2024 0851    ALT 16 10/04/2024 0851    ALT 15 10/04/2024 0851    BILITOT 0.4 10/04/2024 0851    BILITOT 0.4 10/04/2024 0851    ESTGFRAFRICA >60.0 06/27/2022 0954    EGFRNONAA >60.0 06/27/2022 0954          Lab Results   Component Value Date    CHOL 115 (L) 01/26/2024    CHOL 111 (L) 02/13/2023    CHOL 127 03/07/2022     Lab Results   Component Value Date    HDL 49  01/26/2024    HDL 46 02/13/2023    HDL 60 03/07/2022     Lab Results   Component Value Date    LDLCALC 55.4 (L) 01/26/2024    LDLCALC 52.2 (L) 02/13/2023    LDLCALC 52.8 (L) 03/07/2022     Lab Results   Component Value Date    TRIG 53 01/26/2024    TRIG 64 02/13/2023    TRIG 71 03/07/2022     Lab Results   Component Value Date    CHOLHDL 42.6 01/26/2024    CHOLHDL 41.4 02/13/2023    CHOLHDL 47.2 03/07/2022       Lab Results   Component Value Date    MICALBCREAT 18.9 03/15/2024     Lab Results   Component Value Date    TSH 0.453 10/11/2024       CrCl cannot be calculated (Patient's most recent lab result is older than the maximum 7 days allowed.).    Vit D, 25-Hydroxy   Date Value Ref Range Status   05/25/2019 36 30 - 96 ng/mL Final     Comment:     Vitamin D deficiency.........<10 ng/mL                              Vitamin D insufficiency......10-29 ng/mL       Vitamin D sufficiency........> or equal to 30 ng/mL  Vitamin D toxicity............>100 ng/mL           PHYSICAL EXAMINATION  Constitutional: Appears well, no distress  Respiratory: CTA, even and unlabored.  Cardiovascular: RRR, no murmurs, no carotid bruits.    GI: active bowel sounds, no hernia noted  Skin: warm and dry; no visible wounds  Neuro: oriented to person, place, time  Feet: appropriate footwear.         Goals        HEMOGLOBIN A1C < 7                 Assessment/Plan  1. Type 2 diabetes mellitus with both eyes affected by proliferative retinopathy and macular edema, with long-term current use of insulin  -- Controlled.   -- continue metformin, Ozempic  -- consider Mounjaro in the future if additional glucose control is needed.   -- BG monitoring 1x/day. Advised her to alternate times.     -- Discussed diagnosis of DM, A1c goals, progression of disease, long term complications and tx options.  -- takes statin, ace-i   2. Primary hypertension  -- controlled  -- continue current meds       FOLLOW UP  Follow up in about 6 months (around 4/14/2025).    Patient instructed to bring BG logs to each follow up   Patient encouraged to call for any BG/medication issues, concerns, or questions.    Orders Placed This Encounter   Procedures    Hemoglobin A1C    Comprehensive Metabolic Panel    Lipid Panel    Microalbumin/Creatinine Ratio, Urine

## 2024-10-15 LAB
METHYLMALONATE SERPL-SCNC: 0.28 UMOL/L
PATHOLOGIST INTERPRETATION IFE: NORMAL
PATHOLOGIST INTERPRETATION SPE: NORMAL

## 2024-10-18 ENCOUNTER — TELEPHONE (OUTPATIENT)
Dept: ENDOCRINOLOGY | Facility: CLINIC | Age: 64
End: 2024-10-18
Payer: COMMERCIAL

## 2024-11-14 ENCOUNTER — TELEPHONE (OUTPATIENT)
Dept: FAMILY MEDICINE | Facility: CLINIC | Age: 64
End: 2024-11-14
Payer: COMMERCIAL

## 2024-11-14 NOTE — TELEPHONE ENCOUNTER
----- Message from Sherrie sent at 11/14/2024 11:00 AM CST -----  Contact: pt  Type:  Sooner Apoointment Request    Caller is requesting a sooner appointment.  Caller declined first available appointment listed below.  Caller will not accept being placed on the waitlist and is requesting a message be sent to doctor.    Name of Caller: pt    When is the first available appointment? Nothing before current 11/22 appt    Symptoms: ED follow up for reaction to medication    Would the patient rather a call back or a response via [facility]?  Call back    Best Call Back Number: [phone]    Additional Information: please call to advise    Thanks

## 2024-11-14 NOTE — TELEPHONE ENCOUNTER
Pt returned call, no current symptoms.  Will keep previously scheduled follow up.  Advised to call back if appt needed sooner.

## 2024-11-22 ENCOUNTER — OFFICE VISIT (OUTPATIENT)
Dept: FAMILY MEDICINE | Facility: CLINIC | Age: 64
End: 2024-11-22
Payer: COMMERCIAL

## 2024-11-22 VITALS
BODY MASS INDEX: 25.65 KG/M2 | HEIGHT: 69 IN | WEIGHT: 173.19 LBS | DIASTOLIC BLOOD PRESSURE: 76 MMHG | OXYGEN SATURATION: 98 % | HEART RATE: 85 BPM | RESPIRATION RATE: 16 BRPM | TEMPERATURE: 98 F | SYSTOLIC BLOOD PRESSURE: 128 MMHG

## 2024-11-22 DIAGNOSIS — I15.2 HYPERTENSION ASSOCIATED WITH DIABETES: Primary | ICD-10-CM

## 2024-11-22 DIAGNOSIS — E11.59 HYPERTENSION ASSOCIATED WITH DIABETES: Primary | ICD-10-CM

## 2024-11-22 DIAGNOSIS — Z79.4 CONTROLLED TYPE 2 DIABETES MELLITUS WITH BOTH EYES AFFECTED BY PROLIFERATIVE RETINOPATHY AND MACULAR EDEMA, WITH LONG-TERM CURRENT USE OF INSULIN: ICD-10-CM

## 2024-11-22 DIAGNOSIS — E11.3513 CONTROLLED TYPE 2 DIABETES MELLITUS WITH BOTH EYES AFFECTED BY PROLIFERATIVE RETINOPATHY AND MACULAR EDEMA, WITH LONG-TERM CURRENT USE OF INSULIN: ICD-10-CM

## 2024-11-22 DIAGNOSIS — T78.3XXD ANGIOEDEMA, SUBSEQUENT ENCOUNTER: ICD-10-CM

## 2024-11-22 RX ORDER — METFORMIN HYDROCHLORIDE 1000 MG/1
1000 TABLET ORAL 2 TIMES DAILY WITH MEALS
Qty: 180 TABLET | Refills: 2 | Status: SHIPPED | OUTPATIENT
Start: 2024-11-22

## 2024-11-22 RX ORDER — AMLODIPINE BESYLATE 10 MG/1
10 TABLET ORAL DAILY
Qty: 90 TABLET | Refills: 3 | Status: SHIPPED | OUTPATIENT
Start: 2024-11-22

## 2024-11-22 NOTE — PROGRESS NOTES
Subjective:       Patient ID: Nohelia Rodriguez is a 64 y.o. female.    Chief Complaint: Follow-up    HPI  The patient is a 64-year-old who is here today for follow-up.  Overall, she is doing well except for her recent ER visit    Today we discussed the followin) ER visit.  On , she woke up in the middle of the night to urinate and felt as if her tongue was heavy.  When she looked at her tongue, the right side of her tongue was swollen.  She started praying and immediately her tongue swelling went back down to normal.  She still went to the ER.  In the ER, she received a dose of IV methylprednisolone and IV Pepcid.  She was discharged from the ER with hydroxyzine Pepcid and prednisone which she has completed.  Since that night, she has not had any further swelling of her tongue.  She never had any throat swelling.  As she reflects now, she believes that she has had a couple of prior episodes of lip swelling but nothing that concerned her and so she never brought that up.  She is aware that we are suspecting that the lisinopril has been causing angioedema which likely is the cause of the tongue and lip swelling.  She has not been taking her lisinopril since the ER visit   2)  Hypertension.  Today her blood pressure is 128/76.  She is currently taking Coreg and Norvasc but has not taken lisinopril since her ER visit.  Since the ER visit and not taking her lisinopril, she has been checking her blood pressure at home and her readings have been good  3) diabetes.  Her recent A1c was 6.8.  She is taking Ozempic and Glucophage.  She is following with the endocrinology team.      Review of Systems   Constitutional:  Negative for appetite change, chills, diaphoresis, fatigue, fever and unexpected weight change.   HENT:  Negative for congestion, ear pain, postnasal drip, rhinorrhea, sinus pressure, sneezing, sore throat and trouble swallowing.    Eyes:  Negative for pain, discharge and visual disturbance.    Respiratory:  Negative for cough, chest tightness, shortness of breath and wheezing.    Cardiovascular:  Negative for chest pain, palpitations and leg swelling.   Gastrointestinal:  Negative for abdominal distention, abdominal pain, blood in stool, constipation, diarrhea, nausea and vomiting.   Skin:  Negative for rash.         Objective:      Physical Exam  Constitutional:       General: She is not in acute distress.     Appearance: Normal appearance. She is well-developed.   HENT:      Head: Normocephalic and atraumatic.      Right Ear: Hearing, tympanic membrane, ear canal and external ear normal.      Left Ear: Hearing, tympanic membrane, ear canal and external ear normal.      Nose: Nose normal.      Mouth/Throat:      Mouth: No oral lesions.      Pharynx: No oropharyngeal exudate or posterior oropharyngeal erythema.   Eyes:      General: Lids are normal. No scleral icterus.     Extraocular Movements: Extraocular movements intact.      Conjunctiva/sclera: Conjunctivae normal.      Pupils: Pupils are equal, round, and reactive to light.   Neck:      Thyroid: No thyroid mass or thyromegaly.      Vascular: No carotid bruit.   Cardiovascular:      Rate and Rhythm: Normal rate and regular rhythm. No extrasystoles are present.     Chest Wall: PMI is not displaced.      Heart sounds: Normal heart sounds. No murmur heard.     No gallop.   Pulmonary:      Effort: Pulmonary effort is normal. No accessory muscle usage or respiratory distress.      Breath sounds: Normal breath sounds.   Abdominal:      General: Bowel sounds are normal. There is no abdominal bruit.      Palpations: Abdomen is soft.      Tenderness: There is no abdominal tenderness. There is no rebound.   Musculoskeletal:      Cervical back: Normal range of motion and neck supple.   Lymphadenopathy:      Head:      Right side of head: No submental or submandibular adenopathy.      Left side of head: No submental or submandibular adenopathy.      Cervical:  "     Right cervical: No superficial, deep or posterior cervical adenopathy.     Left cervical: No superficial, deep or posterior cervical adenopathy.      Upper Body:      Right upper body: No supraclavicular adenopathy.      Left upper body: No supraclavicular adenopathy.   Skin:     General: Skin is warm and dry.   Neurological:      Mental Status: She is alert and oriented to person, place, and time.       Blood pressure 128/76, pulse 85, temperature 97.7 °F (36.5 °C), temperature source Temporal, resp. rate 16, height 5' 9" (1.753 m), weight 78.5 kg (173 lb 2.7 oz), SpO2 98%.Body mass index is 25.57 kg/m².            A/P:  1) probable angioedema from lisinopril.  We will list her lisinopril as an allergy and she will avoid similar medicines in the future.  If she has recurrent symptoms, she will let me know   2)  Hypertension.  Well controlled.  Continue with Coreg and Norvasc   3) diabetes.  Well controlled.  Follow up with Endocrine and continue with Ozempic and Glucophage as planned  "

## 2024-12-20 ENCOUNTER — OFFICE VISIT (OUTPATIENT)
Dept: HEMATOLOGY/ONCOLOGY | Facility: CLINIC | Age: 64
End: 2024-12-20
Payer: COMMERCIAL

## 2024-12-20 ENCOUNTER — LAB VISIT (OUTPATIENT)
Dept: LAB | Facility: HOSPITAL | Age: 64
End: 2024-12-20
Attending: INTERNAL MEDICINE
Payer: COMMERCIAL

## 2024-12-20 VITALS
HEIGHT: 69 IN | RESPIRATION RATE: 20 BRPM | BODY MASS INDEX: 26.91 KG/M2 | SYSTOLIC BLOOD PRESSURE: 137 MMHG | TEMPERATURE: 98 F | HEART RATE: 83 BPM | DIASTOLIC BLOOD PRESSURE: 78 MMHG | OXYGEN SATURATION: 99 % | WEIGHT: 181.69 LBS

## 2024-12-20 DIAGNOSIS — D64.9 ANEMIA, NORMOCYTIC NORMOCHROMIC: ICD-10-CM

## 2024-12-20 DIAGNOSIS — D72.819 LEUKOPENIA, UNSPECIFIED TYPE: ICD-10-CM

## 2024-12-20 DIAGNOSIS — D64.9 ANEMIA, NORMOCYTIC NORMOCHROMIC: Primary | ICD-10-CM

## 2024-12-20 LAB
ALBUMIN SERPL BCP-MCNC: 3.8 G/DL (ref 3.5–5.2)
ALP SERPL-CCNC: 46 U/L (ref 40–150)
ALT SERPL W/O P-5'-P-CCNC: 13 U/L (ref 10–44)
ANION GAP SERPL CALC-SCNC: 12 MMOL/L (ref 8–16)
ANISOCYTOSIS BLD QL SMEAR: SLIGHT
AST SERPL-CCNC: 17 U/L (ref 10–40)
BASOPHILS # BLD AUTO: 0.03 K/UL (ref 0–0.2)
BASOPHILS NFR BLD: 1.1 % (ref 0–1.9)
BILIRUB SERPL-MCNC: 0.3 MG/DL (ref 0.1–1)
BUN SERPL-MCNC: 24 MG/DL (ref 8–23)
CALCIUM SERPL-MCNC: 9.7 MG/DL (ref 8.7–10.5)
CHLORIDE SERPL-SCNC: 107 MMOL/L (ref 95–110)
CO2 SERPL-SCNC: 23 MMOL/L (ref 23–29)
CREAT SERPL-MCNC: 1.2 MG/DL (ref 0.5–1.4)
DIFFERENTIAL METHOD BLD: ABNORMAL
EOSINOPHIL # BLD AUTO: 0.1 K/UL (ref 0–0.5)
EOSINOPHIL NFR BLD: 1.8 % (ref 0–8)
ERYTHROCYTE [DISTWIDTH] IN BLOOD BY AUTOMATED COUNT: 14.4 % (ref 11.5–14.5)
EST. GFR  (NO RACE VARIABLE): 50.5 ML/MIN/1.73 M^2
GLUCOSE SERPL-MCNC: 190 MG/DL (ref 70–110)
HCT VFR BLD AUTO: 29.2 % (ref 37–48.5)
HGB BLD-MCNC: 9.2 G/DL (ref 12–16)
IMM GRANULOCYTES # BLD AUTO: 0.01 K/UL (ref 0–0.04)
IMM GRANULOCYTES NFR BLD AUTO: 0.4 % (ref 0–0.5)
LYMPHOCYTES # BLD AUTO: 1.2 K/UL (ref 1–4.8)
LYMPHOCYTES NFR BLD: 42.7 % (ref 18–48)
MCH RBC QN AUTO: 27.9 PG (ref 27–31)
MCHC RBC AUTO-ENTMCNC: 31.5 G/DL (ref 32–36)
MCV RBC AUTO: 89 FL (ref 82–98)
MONOCYTES # BLD AUTO: 0.3 K/UL (ref 0.3–1)
MONOCYTES NFR BLD: 12.2 % (ref 4–15)
NEUTROPHILS # BLD AUTO: 1.2 K/UL (ref 1.8–7.7)
NEUTROPHILS NFR BLD: 41.8 % (ref 38–73)
NRBC BLD-RTO: 0 /100 WBC
OVALOCYTES BLD QL SMEAR: ABNORMAL
PLATELET # BLD AUTO: 226 K/UL (ref 150–450)
PLATELET BLD QL SMEAR: ABNORMAL
PMV BLD AUTO: 9.8 FL (ref 9.2–12.9)
POIKILOCYTOSIS BLD QL SMEAR: ABNORMAL
POTASSIUM SERPL-SCNC: 4.4 MMOL/L (ref 3.5–5.1)
PROT SERPL-MCNC: 6.8 G/DL (ref 6–8.4)
RBC # BLD AUTO: 3.3 M/UL (ref 4–5.4)
SCHISTOCYTES BLD QL SMEAR: ABNORMAL
SODIUM SERPL-SCNC: 142 MMOL/L (ref 136–145)
TOXIC GRANULES BLD QL SMEAR: PRESENT
WBC # BLD AUTO: 2.79 K/UL (ref 3.9–12.7)

## 2024-12-20 PROCEDURE — 99999 PR PBB SHADOW E&M-EST. PATIENT-LVL IV: CPT | Mod: PBBFAC,,, | Performed by: INTERNAL MEDICINE

## 2024-12-20 PROCEDURE — 85025 COMPLETE CBC W/AUTO DIFF WBC: CPT | Mod: PN | Performed by: INTERNAL MEDICINE

## 2024-12-20 PROCEDURE — 36415 COLL VENOUS BLD VENIPUNCTURE: CPT | Mod: PN | Performed by: INTERNAL MEDICINE

## 2024-12-20 PROCEDURE — 80053 COMPREHEN METABOLIC PANEL: CPT | Mod: PN | Performed by: INTERNAL MEDICINE

## 2024-12-20 NOTE — PROGRESS NOTES
Subjective:       Name: Nohelia Rodriguez  : 1960  MRN: [id number]    Chief Complaint   Patient presents with    Follow-up     2mths FU with Labs   Leukopenia, unspecified type      Patient is in clinic by herself    HPI: Nohelia Rodriguez is a 64 y.o. female presents to discuss the evaluation done to assess Follow-up (2mths FU with Labs /Leukopenia, unspecified type)  Patient was seen previously by  and  for leukopenia and anemia.       She started taking oral iron daily this week, on top of Vitamin B12.    The patient denies any fever chills night sweats weight loss melena hematochezia hematemesis hematuria. She denies any recent history of traveling or exposure to sick people.  She is cold  natured and have more chills than night sweats.      Oncology History    No history exists.        Past Medical History:   Diagnosis Date    Diabetes mellitus     Diabetic retinopathy     s/p laser treatment    Glaucoma     History of cardiovascular stress test      normal    Hypertension     Mixed hyperlipidemia        Past Surgical History:   Procedure Laterality Date    COLONOSCOPY N/A 3/22/2019    Procedure: COLONOSCOPY;  Surgeon: Kishor Membreno MD;  Location: [facility] ENDO;  Service: Endoscopy;  Laterality: N/A;    diabetic retinopathy laser         Family History   Problem Relation Name Age of Onset    Diabetes Mother      Cataracts Mother      Hypertension Mother      Diabetes Sister      Hypertension Sister      Hyperlipidemia Sister          a fib    Diabetes Brother      Hypertension Brother      Hydrocephalus Son      Diabetes Brother      Diabetes Sister      Diabetes Sister         Social History     Socioeconomic History    Marital status:    Tobacco Use    Smoking status: Never    Smokeless tobacco: Never   Substance and Sexual Activity    Alcohol use: No    Drug use: No    Sexual activity: Not Currently   Social History Narrative    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  [employer] in  Nursing Note by Jessica Kline Yehuda Hilton at 10/29/18 11:03 AM     Author:  Jessica Kline Yehuda Hilton Service:  (none) Author Type:  Certified Medical Assistant     Filed:  10/29/18 11:03 AM Encounter Date:  10/29/2018 Status:  Signed     :  Jessica Kline Yehuda Hilton (Certified Medical Assistant)            If the provider orders any diagnostic testing at today's visit, the patient would prefer to be contacted by means of[LB1.1T] cell[LB1.1M]. If by phone their preferred phone number is[LB1.1T] [phone][LB1.1M] and it is[LB1.1T] ok[LB1.1M] to leave a detailed message on their voicemail or with a family member.   Patient is aware that if they are my chart active most of their test results will be auto released once the provider has viewed them.[LB1.1T]        Revision History        User Key Date/Time User Provider Type Action    > LB1.1 10/29/18 11:03 AM Jessica Kline Yehuda Hilton Certified Medical Assistant Sign    M - Manual, T - Template "automotive department.     Social Drivers of Health     Financial Resource Strain: Medium Risk (8/9/2024)    Overall Financial Resource Strain (CARDIA)     Difficulty of Paying Living Expenses: Somewhat hard   Food Insecurity: Food Insecurity Present (8/9/2024)    Hunger Vital Sign     Worried About Running Out of Food in the Last Year: Sometimes true     Ran Out of Food in the Last Year: Never true   Housing Stability: Unknown (8/9/2024)    Housing Stability Vital Sign     Unable to Pay for Housing in the Last Year: No       Review of patient's allergies indicates:   Allergen Reactions    Lisinopril Edema         ROS:  Answers submitted by the patient for this visit:  Review of Systems Questionnaire (Submitted on 3/15/2024)  appetite change : No  unexpected weight change: No  mouth sores: No  visual disturbance: No  cough: No  shortness of breath: No  chest pain: No  abdominal pain: No  diarrhea: No  frequency: No  back pain: No  rash: No  headaches: No  adenopathy: No  nervous/ anxious: No             Objective:     Vitals:    12/20/24 1123   BP: 137/78   BP Location: Right arm   Patient Position: Sitting   Pulse: 83   Resp: 20   Temp: 97.9 °F (36.6 °C)   TempSrc: Temporal   SpO2: 99%   Weight: 82.4 kg (181 lb 10.5 oz)   Height: 5' 9" (1.753 m)          Physical Exam  Vitals reviewed.   Constitutional:       Appearance: Normal appearance.   HENT:      Head: Normocephalic and atraumatic.   Eyes:      General: No scleral icterus.     Pupils: Pupils are equal, round, and reactive to light.   Cardiovascular:      Rate and Rhythm: Normal rate and regular rhythm.      Pulses: Normal pulses.      Heart sounds: Normal heart sounds.   Pulmonary:      Effort: Pulmonary effort is normal.      Breath sounds: Normal breath sounds.   Abdominal:      General: Bowel sounds are normal. There is no distension.   Musculoskeletal:         General: No swelling.   Lymphadenopathy:      Cervical: No cervical adenopathy.   Skin:     " General: Skin is warm.      Findings: No rash.   Neurological:      General: No focal deficit present.      Mental Status: She is alert and oriented to person, place, and time.      Cranial Nerves: No cranial nerve deficit.      Motor: No weakness.   Psychiatric:         Mood and Affect: Mood normal.         Behavior: Behavior normal.                Current Outpatient Medications on File Prior to Visit   Medication Sig    amLODIPine (NORVASC) 10 MG tablet Take 1 tablet (10 mg total) by mouth once daily.    blood sugar diagnostic Strp To check BG QD times daily, to use with insurance preferred meter    blood-glucose meter kit As directed    carvediloL (COREG) 25 MG tablet TAKE 1 TABLET BY MOUTH TWICE DAILY WITH MEALS    latanoprost 0.005 % ophthalmic solution Place 1 drop into both eyes every evening.     metFORMIN (GLUCOPHAGE) 1000 MG tablet Take 1 tablet (1,000 mg total) by mouth 2 (two) times daily with meals.    ONETOUCH DELICA PLUS LANCET 33 gauge Misc Apply topically 3 (three) times daily.    rosuvastatin (CRESTOR) 10 MG tablet Take 1 tablet (10 mg total) by mouth once daily.    semaglutide (OZEMPIC) 2 mg/dose (8 mg/3 mL) PnIj Inject 2 mg into the skin every 7 days.     No current facility-administered medications on file prior to visit.       CBC:  Lab Results   Component Value Date    WBC 2.79 (L) 12/20/2024    HGB 9.2 (L) 12/20/2024    HCT 29.2 (L) 12/20/2024    MCV 89 12/20/2024     12/20/2024         CMP:  Sodium   Date Value Ref Range Status   12/20/2024 142 136 - 145 mmol/L Final     Potassium   Date Value Ref Range Status   12/20/2024 4.4 3.5 - 5.1 mmol/L Final     Chloride   Date Value Ref Range Status   12/20/2024 107 95 - 110 mmol/L Final     CO2   Date Value Ref Range Status   12/20/2024 23 23 - 29 mmol/L Final     Glucose   Date Value Ref Range Status   12/20/2024 190 (H) 70 - 110 mg/dL Final     BUN   Date Value Ref Range Status   12/20/2024 24 (H) 8 - 23 mg/dL Final     Creatinine   Date  Value Ref Range Status   12/20/2024 1.2 0.5 - 1.4 mg/dL Final     Calcium   Date Value Ref Range Status   12/20/2024 9.7 8.7 - 10.5 mg/dL Final     Total Protein   Date Value Ref Range Status   12/20/2024 6.8 6.0 - 8.4 g/dL Final     Albumin   Date Value Ref Range Status   12/20/2024 3.8 3.5 - 5.2 g/dL Final     Total Bilirubin   Date Value Ref Range Status   12/20/2024 0.3 0.1 - 1.0 mg/dL Final     Comment:     For infants and newborns, interpretation of results should be based  on gestational age, weight and in agreement with clinical  observations.    Premature Infant recommended reference ranges:  Up to 24 hours.............<8.0 mg/dL  Up to 48 hours............<12.0 mg/dL  3-5 days..................<15.0 mg/dL  6-29 days.................<15.0 mg/dL       Alkaline Phosphatase   Date Value Ref Range Status   12/20/2024 46 40 - 150 U/L Final     AST   Date Value Ref Range Status   12/20/2024 17 10 - 40 U/L Final     ALT   Date Value Ref Range Status   12/20/2024 13 10 - 44 U/L Final     Anion Gap   Date Value Ref Range Status   12/20/2024 12 8 - 16 mmol/L Final     eGFR if    Date Value Ref Range Status   06/27/2022 >60.0 >60 mL/min/1.73 m^2 Final     eGFR if non    Date Value Ref Range Status   06/27/2022 >60.0 >60 mL/min/1.73 m^2 Final     Comment:     Calculation used to obtain the estimated glomerular filtration  rate (eGFR) is the CKD-EPI equation.          Posterior Segment OCT Retina-Both eyes  Posterior segment exam performed with 90D and 28D       ECOG SCORE    1 - Restricted in strenuous activity-ambulatory and able to carry out work of a light nature              Assessment/Plan:       Normocytic anemia:  -hemoglobin 9.5 MCV 87 on October 4, 2024.  -Hgb 10.2 stable found January 26, 2024  -I reviewed independently the patient medical record including her laboratory and radiologic findings.  The patient current workup is showing that she has a chronic normocytic anemia of  unknown etiology.  Her labs ruled out vitamin B12 folate thyroid dysfunction as well as iron-deficiency.  - DANISHA came back negative.   -ESR LDH haptoglobin were normal ruling out hemolysisis.  - hemoglobin electrophoresis was negative for sickle cell disease or trait.  -The UA was negative of hematuria.  All workup failed to show a source that could explain normocytic anemia.  She will require a  bone marrow biopsy to assess possibly a bone marrow dysfunction.  The patient decided to proceed with close monitoring with repeat blood workup.    -she will have blood workup drawn today for CBC CMP ESR LDH iron studies vitamin B12 SPEP with immunofixation TSH free T4 DANISHA haptoglobin methylmalonic acid homocystine level  -Ferritin 84 dropped from 110 a year ago. Vitamin B12 350 dropped from 560 a year ago.  -She was advised to continue taking oral iron and Vitamin B12.  -Hgb 9.2 g/dl MCV 89. Crea 1.2 reflecting anemia of chronic disease. Will continue to monitor  -She will be seen back again in 3 months with repeat CBC CMP ferritin  Vitamin B12.     Leukopenia  -WBC 2. 79 ANC 1.2 - improving  -WBC 1.89 with 37% granulocytes and 54% lymphocytes.  - WBC 2.42 fluctuating ANC 1k on January 26, 2024 stable  -after reviewing her blood workup going to 2019 the patient has been chronically leukopenic.  This could be related ethnic variation and less likely related to blood related disorder.  The patient is asymptomatic and denies any history of recurrent infections.    -We will continue to monitor this abnormality with repeat CBC.    -In case this worsened a bone marrow biopsy will be ordered to assess if there is a myelodysplastic disorder behind her chronic leukopenia.                     Med Onc Chart Routing      Follow up with physician 3 months. with repeat CBC CMP ferritin and Vitamin B12   Follow up with [facility]    Infusion scheduling note    Injection scheduling note    Labs    Imaging    Pharmacy appointment    Other  referrals                   Plan was discussed with the patient at length, and she verbalized understanding. Nohelia was given an opportunity to ask questions that were answered to her satisfaction, and she was advised to call in the interval if any problems or questions arise.  Signed:  Yolanda Gonzales MD   Hematology and Oncology  [facility] - HEMATOLOGY ONCOLOGY  [facility], [place]

## 2025-01-23 ENCOUNTER — TELEPHONE (OUTPATIENT)
Dept: CARDIOLOGY | Facility: CLINIC | Age: 65
End: 2025-01-23
Payer: COMMERCIAL

## 2025-01-23 NOTE — TELEPHONE ENCOUNTER
----- Message from Marina sent at 1/22/2025 11:11 AM CST -----  Contact: self  Type:  Sooner Appointment Request    Caller is requesting a sooner appointment.  Caller declined first available appointment listed below.  Caller will not accept being placed on the waitlist and is requesting a message be sent to doctor.    Name of Caller:  pt  When is the first available appointment?  8/22  Symptoms:  annual  Would the patient rather a call back or a response via [facility]? call  Best Call Back Number:  [phone]   Additional Information:  pt would like to be reschedule with dr park or dr kearney pt states she received a letter  2/21 dr dominguez will no longer be with [facility] so she wants to establish with either dr.please call

## 2025-03-05 NOTE — TELEPHONE ENCOUNTER
Care Due:                  Date            Visit Type   Department     Provider  --------------------------------------------------------------------------------                                EP -                              PRIMARY      [facility]    Katelyn Barrett  Last Visit: 11-      CARE ([facility])   MEDICINE       Anger                              EP -                              PRIMARY      [facility]    Katelyn Barrett  Next Visit: 06-      CARE ([facility])   MEDICINE       Anger                                                            Last  Test          Frequency    Reason                     Performed    Due Date  --------------------------------------------------------------------------------    HBA1C.......  6 months...  metFORMIN................  10-   04-    Lipid Panel.  12 months..  rosuvastatin.............  01- 01-    [employer] Embedded Care Due Messages. Reference number: [id number].   3/05/2025 6:51:28 AM CST

## 2025-03-05 NOTE — TELEPHONE ENCOUNTER
Refill Routing Note   Medication(s) are not appropriate for processing by [facility] for the following reason(s):        Required labs outdated    [facility] action(s):  Defer        Medication Therapy Plan: FLOS 04/11/25      Appointments  past 12m or future 3m with PCP    Date Provider   Last Visit   11/22/2024 Katelyn Barrett MD   Next Visit   6/6/2025 Katelyn Barrett MD   ED visits in past 90 days: 0        Note composed:3:12 PM 03/05/2025

## 2025-03-08 RX ORDER — ROSUVASTATIN CALCIUM 10 MG/1
10 TABLET, COATED ORAL
Qty: 90 TABLET | Refills: 0 | Status: SHIPPED | OUTPATIENT
Start: 2025-03-08

## 2025-03-14 ENCOUNTER — LAB VISIT (OUTPATIENT)
Dept: LAB | Facility: HOSPITAL | Age: 65
End: 2025-03-14
Attending: INTERNAL MEDICINE
Payer: COMMERCIAL

## 2025-03-14 DIAGNOSIS — D64.9 ANEMIA, NORMOCYTIC NORMOCHROMIC: ICD-10-CM

## 2025-03-14 DIAGNOSIS — D72.819 LEUKOPENIA, UNSPECIFIED TYPE: ICD-10-CM

## 2025-03-14 LAB
ALBUMIN SERPL BCP-MCNC: 4.2 G/DL (ref 3.5–5.2)
ALP SERPL-CCNC: 45 U/L (ref 40–150)
ALT SERPL W/O P-5'-P-CCNC: 11 U/L (ref 10–44)
ANION GAP SERPL CALC-SCNC: 11 MMOL/L (ref 8–16)
AST SERPL-CCNC: 18 U/L (ref 10–40)
BASOPHILS # BLD AUTO: 0.02 K/UL (ref 0–0.2)
BASOPHILS NFR BLD: 0.9 % (ref 0–1.9)
BILIRUB SERPL-MCNC: 0.4 MG/DL (ref 0.1–1)
BUN SERPL-MCNC: 27 MG/DL (ref 8–23)
CALCIUM SERPL-MCNC: 10.1 MG/DL (ref 8.7–10.5)
CHLORIDE SERPL-SCNC: 106 MMOL/L (ref 95–110)
CO2 SERPL-SCNC: 24 MMOL/L (ref 23–29)
CREAT SERPL-MCNC: 1 MG/DL (ref 0.5–1.4)
DIFFERENTIAL METHOD BLD: ABNORMAL
EOSINOPHIL # BLD AUTO: 0.1 K/UL (ref 0–0.5)
EOSINOPHIL NFR BLD: 2.7 % (ref 0–8)
ERYTHROCYTE [DISTWIDTH] IN BLOOD BY AUTOMATED COUNT: 13.7 % (ref 11.5–14.5)
EST. GFR  (NO RACE VARIABLE): >60 ML/MIN/1.73 M^2
FERRITIN SERPL-MCNC: 65 NG/ML (ref 20–300)
GLUCOSE SERPL-MCNC: 111 MG/DL (ref 70–110)
HCT VFR BLD AUTO: 31.2 % (ref 37–48.5)
HGB BLD-MCNC: 10.1 G/DL (ref 12–16)
IMM GRANULOCYTES # BLD AUTO: 0 K/UL (ref 0–0.04)
IMM GRANULOCYTES NFR BLD AUTO: 0 % (ref 0–0.5)
LYMPHOCYTES # BLD AUTO: 1.2 K/UL (ref 1–4.8)
LYMPHOCYTES NFR BLD: 51.8 % (ref 18–48)
MCH RBC QN AUTO: 28.1 PG (ref 27–31)
MCHC RBC AUTO-ENTMCNC: 32.4 G/DL (ref 32–36)
MCV RBC AUTO: 87 FL (ref 82–98)
MONOCYTES # BLD AUTO: 0.2 K/UL (ref 0.3–1)
MONOCYTES NFR BLD: 10.7 % (ref 4–15)
NEUTROPHILS # BLD AUTO: 0.8 K/UL (ref 1.8–7.7)
NEUTROPHILS NFR BLD: 33.9 % (ref 38–73)
NRBC BLD-RTO: 0 /100 WBC
PLATELET # BLD AUTO: 231 K/UL (ref 150–450)
PLATELET BLD QL SMEAR: ABNORMAL
PMV BLD AUTO: 10 FL (ref 9.2–12.9)
POTASSIUM SERPL-SCNC: 3.7 MMOL/L (ref 3.5–5.1)
PROT SERPL-MCNC: 7.7 G/DL (ref 6–8.4)
RBC # BLD AUTO: 3.6 M/UL (ref 4–5.4)
SODIUM SERPL-SCNC: 141 MMOL/L (ref 136–145)
VIT B12 SERPL-MCNC: 908 PG/ML (ref 210–950)
WBC # BLD AUTO: 2.24 K/UL (ref 3.9–12.7)

## 2025-03-14 PROCEDURE — 82728 ASSAY OF FERRITIN: CPT | Performed by: INTERNAL MEDICINE

## 2025-03-14 PROCEDURE — 80053 COMPREHEN METABOLIC PANEL: CPT | Mod: PN | Performed by: INTERNAL MEDICINE

## 2025-03-14 PROCEDURE — 85025 COMPLETE CBC W/AUTO DIFF WBC: CPT | Mod: PN | Performed by: INTERNAL MEDICINE

## 2025-03-14 PROCEDURE — 36415 COLL VENOUS BLD VENIPUNCTURE: CPT | Mod: PN | Performed by: INTERNAL MEDICINE

## 2025-03-14 PROCEDURE — 82607 VITAMIN B-12: CPT | Performed by: INTERNAL MEDICINE

## 2025-03-20 ENCOUNTER — TELEPHONE (OUTPATIENT)
Dept: HEMATOLOGY/ONCOLOGY | Facility: CLINIC | Age: 65
End: 2025-03-20
Payer: COMMERCIAL

## 2025-03-20 NOTE — TELEPHONE ENCOUNTER
Patient accidentally cancelled her appointment with Dr Gonzales for 3/21 to review lab results. Assisted with rescheduling her to see the NP due to Dr Gonzales no longer having availability. Patient agreeable to come in to see Zac Fairbanks NP at 2:00 pm on 3/21. Will cancel 3/25 appointment with Dr Gonzales.    ----- Message from Angelina sent at 3/20/2025  1:01 PM CDT -----  Contact: pt @ [phone]  TINO ALEXANDER calling regarding Appointment Access  (message) for #pt is calling to see if possible can be seen tomorrow, pt hit the wrong number she was trying to confirm appt for tomorrow. Asking for call back

## 2025-03-20 NOTE — PROGRESS NOTES
PATIENT: Nohelia Rodriguez  MRN: [id number]  DATE: 3/21/2025      Diagnosis:   1. Anemia, normocytic normochromic    2. Leukopenia, unspecified type        Chief Complaint: Establish Care (- f/u with labs 3/14. patient accidentally cancelled her appointment with Dr Gonzales and would like to review l)          Subjective:    Interval History: Ms. Rodriguez is a 65 y.o. female who returns for follow up. Reports feeling well overall. Staying active. Complaint with medications and supplements. Denies fever, chills, sob, cp, palpitations, swelling, fatigue, abdominal pain, new bumps, lumps, bleeding, bruising.     Oncologic History:   Per Record:   Patient was seen previously by  and  for leukopenia and anemia.    12/20/25  She started taking oral iron daily this week, on top of Vitamin B12.     Oncology History    No history exists.       Past Medical History:   Past Medical History:   Diagnosis Date    Diabetes mellitus     Diabetic retinopathy     s/p laser treatment    Glaucoma     History of cardiovascular stress test     2/24 normal    Hypertension     Mixed hyperlipidemia        Past Surgical HIstory:   Past Surgical History:   Procedure Laterality Date    COLONOSCOPY N/A 3/22/2019    Procedure: COLONOSCOPY;  Surgeon: Kishor Membreno MD;  Location: [facility];  Service: Endoscopy;  Laterality: N/A;    diabetic retinopathy laser         Family History:   Family History   Problem Relation Name Age of Onset    Diabetes Mother      Cataracts Mother      Hypertension Mother      Diabetes Sister      Hypertension Sister      Hyperlipidemia Sister          a fib    Diabetes Brother      Hypertension Brother      Hydrocephalus Son      Diabetes Brother      Diabetes Sister      Diabetes Sister         Social History:  reports that she has never smoked. She has never used smokeless tobacco. She reports that she does not drink alcohol and does not use drugs.    Allergies:  Review of patient's allergies indicates:  "  Allergen Reactions    Lisinopril Edema       Medications:  Current Medications[1]    Review of Systems   Constitutional:  Negative for chills, fatigue and fever.   Respiratory:  Negative for shortness of breath.    Cardiovascular:  Negative for chest pain and palpitations.   Gastrointestinal:  Negative for blood in stool.   Musculoskeletal: Negative.    Skin: Negative.    Neurological: Negative.    Hematological: Negative.    Psychiatric/Behavioral: Negative.         ECOG Performance Status:   ECOG SCORE             Objective:      Vitals:   Vitals:    03/21/25 1324   BP: (!) 146/82   BP Location: Left arm   Patient Position: Sitting   Pulse: 86   Resp: 18   Temp: 97.3 °F (36.3 °C)   TempSrc: Temporal   SpO2: 99%   Weight: 84.2 kg (185 lb 10 oz)   Height: 5' 9" (1.753 m)     BMI: Body mass index is 27.41 kg/m².    Physical Exam  HENT:      Head: Normocephalic.      Nose: Nose normal.      Mouth/Throat:      Mouth: Mucous membranes are moist.      Pharynx: Oropharynx is clear.   Eyes:      Pupils: Pupils are equal, round, and reactive to light.   Pulmonary:      Effort: Pulmonary effort is normal.      Breath sounds: Normal breath sounds.   Abdominal:      General: Bowel sounds are normal.   Neurological:      Mental Status: She is alert.         Laboratory Data:  No results found for this or any previous visit (from the past 24 hours).       Imaging: reviewed   Assessment:       1. Anemia, normocytic normochromic    2. Leukopenia, unspecified type           Plan:     Normocytic anemia:  -hemoglobin 9.5 MCV 87 on October 4, 2024.  -Hgb 10.2 stable found January 26, 2024  -The patient current workup is showing that she has a chronic normocytic anemia of unknown etiology.  Her labs ruled out vitamin B12 folate thyroid dysfunction as well as iron-deficiency.  - DANISHA came back negative.   -ESR LDH haptoglobin were normal ruling out hemolysisis.  - hemoglobin electrophoresis was negative for sickle cell disease or " trait.  -The UA was negative of hematuria.  All workup failed to show a source that could explain normocytic anemia.  She will require a  bone marrow biopsy to assess possibly a bone marrow dysfunction.  The patient decided to proceed with close monitoring with repeat blood workup.    -she will have blood workup drawn today for CBC CMP ESR LDH iron studies vitamin B12 SPEP with immunofixation TSH free T4 DANISHA haptoglobin methylmalonic acid homocystine level  -Ferritin 84 dropped from 110 a year ago. Vitamin B12 350 dropped from 560 a year ago.  -She was advised to continue taking oral iron and Vitamin B12.  -Hgb 9.2 g/dl MCV 89. Crea 1.2 reflecting anemia of chronic disease. Will continue to monitor  3/21/25:  Most recent labs reveal  Wbc 2.24, ANC 0.8, Hgb 10.1, Hct 31.2, MCV 87, Ferritin 65, B12 908  Creatinine 1.0  -She would like to continue with monitoring and defer bone marrow biopsy for now.   -She will be seen back again in 3 months with repeat CBC CMP ferritin  Vitamin B12.          Leukopenia  3/14/25 WBC 2.24, ANC 0.8-Continues to fluctuate  -WBC 2. 79 ANC 1.2 - improving  -WBC 1.89 with 37% granulocytes and 54% lymphocytes.  - WBC 2.42 fluctuating ANC 1k on January 26, 2024 stable  -after reviewing her blood workup going to 2019 the patient has been chronically leukopenic.  This could be related ethnic variation and less likely related to blood related disorder.  The patient is asymptomatic and denies any history of recurrent infections.    -We will continue to monitor this abnormality with repeat CBC.    -In case this worsened a bone marrow biopsy will be ordered to assess if there is a myelodysplastic disorder behind her chronic leukopenia.         Med Onc Chart Routing      Follow up with physician 3 months. Dr Gonzales with cbc, cmp, ferritn, vit b12 at [facility] 1 day prior to treatment   Follow up with GRET[facility]    Infusion scheduling note    Injection scheduling note    Labs    Imaging    Pharmacy appointment     Other referrals                  Plan was discussed with the patient at length, and she verbalized understanding. Nohelia was given an opportunity to ask questions that were answered to her satisfaction, and she was advised to call in the interval if any problems or questions arise.    Assessment/Plan reviewed and approved by Dr Gonzales    15 minutes were spent in coordination of patient's care, record review and counseling.    GIL Chaudhary, FNP-C  Hematology & Oncology         [1]   Current Outpatient Medications   Medication Sig Dispense Refill    amLODIPine (NORVASC) 10 MG tablet Take 1 tablet (10 mg total) by mouth once daily. 90 tablet 3    blood sugar diagnostic Strp To check BG QD times daily, to use with insurance preferred meter 100 each 5    blood-glucose meter kit As directed 1 each 0    carvediloL (COREG) 25 MG tablet TAKE 1 TABLET BY MOUTH TWICE DAILY WITH MEALS 180 tablet 3    latanoprost 0.005 % ophthalmic solution Place 1 drop into both eyes every evening.       metFORMIN (GLUCOPHAGE) 1000 MG tablet Take 1 tablet (1,000 mg total) by mouth 2 (two) times daily with meals. 180 tablet 2    ONETOUCH DELICA PLUS LANCET 33 gauge Misc Apply topically 3 (three) times daily.      rosuvastatin (CRESTOR) 10 MG tablet Take 1 tablet by mouth once daily 90 tablet 0    semaglutide (OZEMPIC) 2 mg/dose (8 mg/3 mL) PnYves Inject 2 mg into the skin every 7 days. 3 mL 6     No current facility-administered medications for this visit.

## 2025-03-21 ENCOUNTER — TELEPHONE (OUTPATIENT)
Dept: FAMILY MEDICINE | Facility: CLINIC | Age: 65
End: 2025-03-21
Payer: COMMERCIAL

## 2025-03-21 ENCOUNTER — OFFICE VISIT (OUTPATIENT)
Dept: HEMATOLOGY/ONCOLOGY | Facility: CLINIC | Age: 65
End: 2025-03-21
Payer: COMMERCIAL

## 2025-03-21 VITALS
TEMPERATURE: 97 F | DIASTOLIC BLOOD PRESSURE: 82 MMHG | HEART RATE: 86 BPM | BODY MASS INDEX: 27.49 KG/M2 | SYSTOLIC BLOOD PRESSURE: 146 MMHG | RESPIRATION RATE: 18 BRPM | OXYGEN SATURATION: 99 % | HEIGHT: 69 IN | WEIGHT: 185.63 LBS

## 2025-03-21 DIAGNOSIS — D72.819 LEUKOPENIA, UNSPECIFIED TYPE: ICD-10-CM

## 2025-03-21 DIAGNOSIS — D64.9 ANEMIA, NORMOCYTIC NORMOCHROMIC: Primary | ICD-10-CM

## 2025-03-21 DIAGNOSIS — Z12.31 OTHER SCREENING MAMMOGRAM: Primary | ICD-10-CM

## 2025-03-21 PROCEDURE — 99999 PR PBB SHADOW E&M-EST. PATIENT-LVL IV: CPT | Mod: PBBFAC,,,

## 2025-03-21 NOTE — TELEPHONE ENCOUNTER
----- Message from Malia sent at 3/21/2025 11:44 AM CDT -----  Contact: Self  Type: Needs Medical AdviceWho Called:  Martin Call Back Number: [phone] Information: Pt is calling in regards to a reminder she received that her next mammogram is due in April, she is needing to have these orders put in and can we please call pt back to confirm, stated she would like this to be scheduled on a Friday. Thank You

## 2025-04-01 DIAGNOSIS — Z78.0 MENOPAUSE: ICD-10-CM

## 2025-04-11 ENCOUNTER — RESULTS FOLLOW-UP (OUTPATIENT)
Dept: ENDOCRINOLOGY | Facility: CLINIC | Age: 65
End: 2025-04-11

## 2025-04-11 ENCOUNTER — PATIENT MESSAGE (OUTPATIENT)
Dept: FAMILY MEDICINE | Facility: CLINIC | Age: 65
End: 2025-04-11
Payer: COMMERCIAL

## 2025-04-11 ENCOUNTER — HOSPITAL ENCOUNTER (OUTPATIENT)
Dept: RADIOLOGY | Facility: HOSPITAL | Age: 65
Discharge: HOME | End: 2025-04-11
Attending: FAMILY MEDICINE
Payer: COMMERCIAL

## 2025-04-11 DIAGNOSIS — Z12.31 OTHER SCREENING MAMMOGRAM: ICD-10-CM

## 2025-04-11 PROCEDURE — 77063 BREAST TOMOSYNTHESIS BI: CPT | Mod: 26,,, | Performed by: RADIOLOGY

## 2025-04-11 PROCEDURE — 77067 SCR MAMMO BI INCL CAD: CPT | Mod: 26,,, | Performed by: RADIOLOGY

## 2025-04-11 PROCEDURE — 77067 SCR MAMMO BI INCL CAD: CPT | Mod: TC,PO

## 2025-04-14 ENCOUNTER — OFFICE VISIT (OUTPATIENT)
Dept: ENDOCRINOLOGY | Facility: CLINIC | Age: 65
End: 2025-04-14
Payer: COMMERCIAL

## 2025-04-14 VITALS
BODY MASS INDEX: 27.81 KG/M2 | SYSTOLIC BLOOD PRESSURE: 110 MMHG | HEART RATE: 80 BPM | DIASTOLIC BLOOD PRESSURE: 66 MMHG | HEIGHT: 69 IN | WEIGHT: 187.75 LBS

## 2025-04-14 DIAGNOSIS — Z79.4 TYPE 2 DIABETES MELLITUS WITH BOTH EYES AFFECTED BY PROLIFERATIVE RETINOPATHY AND MACULAR EDEMA, WITH LONG-TERM CURRENT USE OF INSULIN: Primary | ICD-10-CM

## 2025-04-14 DIAGNOSIS — E11.3513 TYPE 2 DIABETES MELLITUS WITH BOTH EYES AFFECTED BY PROLIFERATIVE RETINOPATHY AND MACULAR EDEMA, WITH LONG-TERM CURRENT USE OF INSULIN: Primary | ICD-10-CM

## 2025-04-14 DIAGNOSIS — I10 PRIMARY HYPERTENSION: ICD-10-CM

## 2025-04-14 PROCEDURE — 3008F BODY MASS INDEX DOCD: CPT | Mod: CPTII,S$GLB,, | Performed by: NURSE PRACTITIONER

## 2025-04-14 PROCEDURE — 99999 PR PBB SHADOW E&M-EST. PATIENT-LVL IV: CPT | Mod: PBBFAC,,, | Performed by: NURSE PRACTITIONER

## 2025-04-14 PROCEDURE — 99214 OFFICE O/P EST MOD 30 MIN: CPT | Mod: S$GLB,,, | Performed by: NURSE PRACTITIONER

## 2025-04-14 PROCEDURE — 3074F SYST BP LT 130 MM HG: CPT | Mod: CPTII,S$GLB,, | Performed by: NURSE PRACTITIONER

## 2025-04-14 PROCEDURE — 1159F MED LIST DOCD IN RCRD: CPT | Mod: CPTII,S$GLB,, | Performed by: NURSE PRACTITIONER

## 2025-04-14 PROCEDURE — 3288F FALL RISK ASSESSMENT DOCD: CPT | Mod: CPTII,S$GLB,, | Performed by: NURSE PRACTITIONER

## 2025-04-14 PROCEDURE — 1160F RVW MEDS BY RX/DR IN RCRD: CPT | Mod: CPTII,S$GLB,, | Performed by: NURSE PRACTITIONER

## 2025-04-14 PROCEDURE — 3061F NEG MICROALBUMINURIA REV: CPT | Mod: CPTII,S$GLB,, | Performed by: NURSE PRACTITIONER

## 2025-04-14 PROCEDURE — 1101F PT FALLS ASSESS-DOCD LE1/YR: CPT | Mod: CPTII,S$GLB,, | Performed by: NURSE PRACTITIONER

## 2025-04-14 PROCEDURE — G2211 COMPLEX E/M VISIT ADD ON: HCPCS | Mod: S$GLB,,, | Performed by: NURSE PRACTITIONER

## 2025-04-14 PROCEDURE — 3078F DIAST BP <80 MM HG: CPT | Mod: CPTII,S$GLB,, | Performed by: NURSE PRACTITIONER

## 2025-04-14 PROCEDURE — 3066F NEPHROPATHY DOC TX: CPT | Mod: CPTII,S$GLB,, | Performed by: NURSE PRACTITIONER

## 2025-04-14 PROCEDURE — 3051F HG A1C>EQUAL 7.0%<8.0%: CPT | Mod: CPTII,S$GLB,, | Performed by: NURSE PRACTITIONER

## 2025-04-14 RX ORDER — SEMAGLUTIDE 2.68 MG/ML
2 INJECTION, SOLUTION SUBCUTANEOUS
Qty: 3 ML | Refills: 11 | Status: SHIPPED | OUTPATIENT
Start: 2025-04-14

## 2025-04-14 NOTE — PROGRESS NOTES
"CC: Ms. Nohelia Rodriguez arrives today for management of Type 2 DM and review of chronic medical conditions, as listed in the Visit Diagnosis section of this encounter.       HPI: Ms. Nohelia Rodriguez was diagnosed with Type 2 DM in her 40s. She did have GDM ~ 10 years prior to Type 2 DM diagnosis. She was diagnosed based on lab work. Initial treatment consisted of orals. + FH of DM in mother, 3 sisters, 2 brothers. Denies hospitalizations due to DM.     Patient was last seen by me in October.    She has noticed weight gain. States this was due to holidays, birthdays in the winter. She has a plan to improve diet and continue exercise.     BG readings are checked 1x/day.  Reports the following:   Fastin-120    Hypoglycemia: No    Missing Insulin/PO medication doses: No    Exercise: Yes - goes to gym 3 days/week.     Dietary Habits: Eats 3 meals/day. Snacks on veggies or fruit. Avoids sugary beverages.     Last DM education appointment: 3/14/2022      CURRENT DIABETIC MEDS: metformin 1000 mg BID, Ozempic 2 mg weekly  Vial or pen: pen  Glucometer type:     Previous DM treatments:  Glyburide  Lantus  Trulicity - availability     Last Eye Exam: 8/15/2024, + DR. Jessica Herrera. Also follows annually with Dr. Xavier.  Last Podiatry Exam: 2021    REVIEW OF SYSTEMS  Constitutional: no c/o fatigue, weakness, weight loss. + 12 # weight gain.  Cardiac: no palpitations or chest pain.  Respiratory: no cough or dyspnea.  GI: no c/o abdominal pain or nausea. Denies h/o pancreatitis. + mild intermittent constipation (uses Miralax as needed)  Skin: no lesions or rashes.  Neuro: no numbness, tingling, or parasthesias.  Endocrine: denies polyphagia, polydipsia, polyuria      Personally reviewed Past Medical, Surgical, Social History    Vital Signs  /66   Pulse 80   Ht 5' 9" (1.753 m)   Wt 85.2 kg (187 lb 11.6 oz)   BMI 27.72 kg/m²     Personally reviewed the below labs:    Hemoglobin A1C   Date Value Ref Range Status "   10/04/2024 6.8 (H) 4.0 - 5.6 % Final     Comment:     ADA Screening Guidelines:  5.7-6.4%  Consistent with prediabetes  >or=6.5%  Consistent with diabetes    High levels of fetal hemoglobin interfere with the HbA1C  assay. Heterozygous hemoglobin variants (HbS, HgC, etc)do  not significantly interfere with this assay.   However, presence of multiple variants may affect accuracy.     04/05/2024 6.7 (H) 4.0 - 5.6 % Final     Comment:     ADA Screening Guidelines:  5.7-6.4%  Consistent with prediabetes  >or=6.5%  Consistent with diabetes    High levels of fetal hemoglobin interfere with the HbA1C  assay. Heterozygous hemoglobin variants (HbS, HgC, etc)do  not significantly interfere with this assay.   However, presence of multiple variants may affect accuracy.     01/26/2024 6.8 (H) 4.0 - 5.6 % Final     Comment:     ADA Screening Guidelines:  5.7-6.4%  Consistent with prediabetes  >or=6.5%  Consistent with diabetes    High levels of fetal hemoglobin interfere with the HbA1C  assay. Heterozygous hemoglobin variants (HbS, HgC, etc)do  not significantly interfere with this assay.   However, presence of multiple variants may affect accuracy.       Hemoglobin A1c   Date Value Ref Range Status   04/11/2025 7.0 (H) 4.0 - 5.6 % Final     Comment:     ADA Screening Guidelines:  5.7-6.4%  Consistent with prediabetes  >=6.5%  Consistent with diabetes    High levels of fetal hemoglobin interfere with the HbA1C  assay. Heterozygous hemoglobin variants (HbS, HgC, etc)do  not significantly interfere with this assay.   However, presence of multiple variants may affect accuracy.       Chemistry        Component Value Date/Time     04/11/2025 0838     03/14/2025 0840    K 4.4 04/11/2025 0838    K 3.7 03/14/2025 0840     04/11/2025 0838     03/14/2025 0840    CO2 28 04/11/2025 0838    CO2 24 03/14/2025 0840    BUN 20 04/11/2025 0838    CREATININE 1.1 04/11/2025 0838     (H) 03/14/2025 0840         Component Value Date/Time    CALCIUM 10.0 04/11/2025 0838    CALCIUM 10.1 03/14/2025 0840    ALKPHOS 46 04/11/2025 0838    ALKPHOS 45 03/14/2025 0840    AST 17 04/11/2025 0838    AST 18 03/14/2025 0840    ALT 9 (L) 04/11/2025 0838    ALT 11 03/14/2025 0840    BILITOT 0.3 04/11/2025 0838    BILITOT 0.4 03/14/2025 0840    ESTGFRAFRICA >60.0 06/27/2022 0954    EGFRNONAA >60.0 06/27/2022 0954          Lab Results   Component Value Date    CHOL 124 04/11/2025    CHOL 115 (L) 01/26/2024    CHOL 111 (L) 02/13/2023     Lab Results   Component Value Date    HDL 53 04/11/2025    HDL 49 01/26/2024    HDL 46 02/13/2023     Lab Results   Component Value Date    LDLCALC 55.4 (L) 01/26/2024    LDLCALC 52.2 (L) 02/13/2023    LDLCALC 52.8 (L) 03/07/2022     Lab Results   Component Value Date    TRIG 68 04/11/2025    TRIG 53 01/26/2024    TRIG 64 02/13/2023     Lab Results   Component Value Date    CHOLHDL 42.7 04/11/2025    CHOLHDL 42.6 01/26/2024    CHOLHDL 41.4 02/13/2023       Lab Results   Component Value Date    MICALBCREAT 27.7 04/11/2025     Lab Results   Component Value Date    TSH 0.453 10/11/2024       CrCl cannot be calculated (Unknown ideal weight.).    Vit D, 25-Hydroxy   Date Value Ref Range Status   05/25/2019 36 30 - 96 ng/mL Final     Comment:     Vitamin D deficiency.........<10 ng/mL                              Vitamin D insufficiency......10-29 ng/mL       Vitamin D sufficiency........> or equal to 30 ng/mL  Vitamin D toxicity............>100 ng/mL           PHYSICAL EXAMINATION  Constitutional: Appears well, no distress  Respiratory: CTA, even and unlabored.  Cardiovascular: RRR, no murmurs, no carotid bruits.    GI: active bowel sounds, no hernia noted  Skin: warm and dry; no visible wounds  Neuro: oriented to person, place, time  Feet: appropriate footwear.         Goals        HEMOGLOBIN A1C < 7                 Assessment/Plan  1. Type 2 diabetes mellitus with both eyes affected by proliferative  retinopathy and macular edema, with long-term current use of insulin  -- Reasonable control but recent weight gain. Discussed that if A1c increases any further, we may change Ozempic to Mounjaro.   -- continue metformin, Ozempic  -- BG monitoring 1x/day, alternating times.     -- Discussed diagnosis of DM, A1c goals, progression of disease, long term complications and tx options.  -- takes statin, ace-i   2. Primary hypertension  -- controlled  -- continue current meds       FOLLOW UP  Follow up in about 6 months (around 10/14/2025).   Patient instructed to bring BG logs to each follow up   Patient encouraged to call for any BG/medication issues, concerns, or questions.    Orders Placed This Encounter   Procedures    Hemoglobin A1C    Comprehensive Metabolic Panel

## 2025-05-09 RX ORDER — CARVEDILOL 25 MG/1
25 TABLET ORAL 2 TIMES DAILY WITH MEALS
Qty: 180 TABLET | Refills: 1 | Status: SHIPPED | OUTPATIENT
Start: 2025-05-09

## 2025-05-09 NOTE — TELEPHONE ENCOUNTER
Refill Decision Note   Nohelia Rodriguez  is requesting a refill authorization.  Brief Assessment and Rationale for Refill:  Approve     Medication Therapy Plan:         Comments:     Note composed:12:23 PM 05/09/2025

## 2025-05-09 NOTE — TELEPHONE ENCOUNTER
No care due was identified.  [facility] Embedded Care Due Messages. Reference number: [id number].   5/09/2025 6:51:22 AM CDT

## 2025-05-22 ENCOUNTER — PATIENT MESSAGE (OUTPATIENT)
Dept: OBSTETRICS AND GYNECOLOGY | Facility: CLINIC | Age: 65
End: 2025-05-22
Payer: COMMERCIAL

## 2025-06-06 ENCOUNTER — OFFICE VISIT (OUTPATIENT)
Dept: FAMILY MEDICINE | Facility: CLINIC | Age: 65
End: 2025-06-06
Payer: COMMERCIAL

## 2025-06-06 VITALS
SYSTOLIC BLOOD PRESSURE: 130 MMHG | HEART RATE: 69 BPM | WEIGHT: 186.38 LBS | TEMPERATURE: 98 F | HEIGHT: 69 IN | OXYGEN SATURATION: 99 % | BODY MASS INDEX: 27.6 KG/M2 | RESPIRATION RATE: 18 BRPM | DIASTOLIC BLOOD PRESSURE: 70 MMHG

## 2025-06-06 DIAGNOSIS — Z79.4 CONTROLLED TYPE 2 DIABETES MELLITUS WITH BOTH EYES AFFECTED BY PROLIFERATIVE RETINOPATHY AND MACULAR EDEMA, WITH LONG-TERM CURRENT USE OF INSULIN: ICD-10-CM

## 2025-06-06 DIAGNOSIS — E78.5 HYPERLIPIDEMIA ASSOCIATED WITH TYPE 2 DIABETES MELLITUS: ICD-10-CM

## 2025-06-06 DIAGNOSIS — E11.3513 CONTROLLED TYPE 2 DIABETES MELLITUS WITH BOTH EYES AFFECTED BY PROLIFERATIVE RETINOPATHY AND MACULAR EDEMA, WITH LONG-TERM CURRENT USE OF INSULIN: ICD-10-CM

## 2025-06-06 DIAGNOSIS — E11.59 HYPERTENSION ASSOCIATED WITH DIABETES: ICD-10-CM

## 2025-06-06 DIAGNOSIS — E11.69 HYPERLIPIDEMIA ASSOCIATED WITH TYPE 2 DIABETES MELLITUS: ICD-10-CM

## 2025-06-06 DIAGNOSIS — I15.2 HYPERTENSION ASSOCIATED WITH DIABETES: ICD-10-CM

## 2025-06-06 DIAGNOSIS — Z00.00 ANNUAL PHYSICAL EXAM: Primary | ICD-10-CM

## 2025-06-06 RX ORDER — METFORMIN HYDROCHLORIDE 1000 MG/1
1000 TABLET ORAL 2 TIMES DAILY WITH MEALS
Qty: 180 TABLET | Refills: 2 | Status: SHIPPED | OUTPATIENT
Start: 2025-06-06

## 2025-06-06 RX ORDER — INSULIN GLARGINE 100 [IU]/ML
INJECTION, SOLUTION SUBCUTANEOUS
COMMUNITY

## 2025-06-06 RX ORDER — LOSARTAN POTASSIUM 25 MG/1
TABLET ORAL
COMMUNITY

## 2025-06-06 RX ORDER — ROSUVASTATIN CALCIUM 10 MG/1
10 TABLET, COATED ORAL NIGHTLY
Qty: 90 TABLET | Refills: 3 | Status: SHIPPED | OUTPATIENT
Start: 2025-06-06

## 2025-06-06 RX ORDER — AMLODIPINE BESYLATE 5 MG/1
5 TABLET ORAL DAILY
Qty: 90 TABLET | Refills: 3 | Status: SHIPPED | OUTPATIENT
Start: 2025-06-06 | End: 2026-06-06

## 2025-06-13 ENCOUNTER — LAB VISIT (OUTPATIENT)
Dept: LAB | Facility: HOSPITAL | Age: 65
End: 2025-06-13
Payer: COMMERCIAL

## 2025-06-13 DIAGNOSIS — D72.819 LEUKOPENIA, UNSPECIFIED TYPE: ICD-10-CM

## 2025-06-13 DIAGNOSIS — D64.9 ANEMIA, NORMOCYTIC NORMOCHROMIC: ICD-10-CM

## 2025-06-13 LAB
ABSOLUTE EOSINOPHIL (OHS): 0.07 K/UL
ABSOLUTE MONOCYTE (OHS): 0.27 K/UL (ref 0.3–1)
ABSOLUTE NEUTROPHIL COUNT (OHS): 0.84 K/UL (ref 1.8–7.7)
ALBUMIN SERPL BCP-MCNC: 4.1 G/DL (ref 3.5–5.2)
ALP SERPL-CCNC: 40 UNIT/L (ref 40–150)
ALT SERPL W/O P-5'-P-CCNC: 8 UNIT/L (ref 10–44)
ANION GAP (OHS): 9 MMOL/L (ref 8–16)
AST SERPL-CCNC: 16 UNIT/L (ref 11–45)
BASOPHILS # BLD AUTO: 0.03 K/UL
BASOPHILS NFR BLD AUTO: 1.2 %
BILIRUB SERPL-MCNC: 0.3 MG/DL (ref 0.1–1)
BUN SERPL-MCNC: 22 MG/DL (ref 8–23)
CALCIUM SERPL-MCNC: 10 MG/DL (ref 8.7–10.5)
CHLORIDE SERPL-SCNC: 105 MMOL/L (ref 95–110)
CO2 SERPL-SCNC: 26 MMOL/L (ref 23–29)
CREAT SERPL-MCNC: 1.1 MG/DL (ref 0.5–1.4)
ERYTHROCYTE [DISTWIDTH] IN BLOOD BY AUTOMATED COUNT: 14 % (ref 11.5–14.5)
FERRITIN SERPL-MCNC: 76 NG/ML (ref 20–300)
GFR SERPLBLD CREATININE-BSD FMLA CKD-EPI: 56 ML/MIN/1.73/M2
GLUCOSE SERPL-MCNC: 123 MG/DL (ref 70–110)
HCT VFR BLD AUTO: 30.8 % (ref 37–48.5)
HGB BLD-MCNC: 10 GM/DL (ref 12–16)
IMM GRANULOCYTES # BLD AUTO: 0.01 K/UL (ref 0–0.04)
IMM GRANULOCYTES NFR BLD AUTO: 0.4 % (ref 0–0.5)
LYMPHOCYTES # BLD AUTO: 1.37 K/UL (ref 1–4.8)
MCH RBC QN AUTO: 27.8 PG (ref 27–31)
MCHC RBC AUTO-ENTMCNC: 32.5 G/DL (ref 32–36)
MCV RBC AUTO: 86 FL (ref 82–98)
NUCLEATED RBC (/100WBC) (OHS): 0 /100 WBC
OVALOCYTES BLD QL SMEAR: NORMAL
PLATELET # BLD AUTO: 229 K/UL (ref 150–450)
PLATELET BLD QL SMEAR: NORMAL
PMV BLD AUTO: 10 FL (ref 9.2–12.9)
POIKILOCYTOSIS BLD QL SMEAR: NORMAL
POTASSIUM SERPL-SCNC: 4.2 MMOL/L (ref 3.5–5.1)
PROT SERPL-MCNC: 7.7 GM/DL (ref 6–8.4)
RBC # BLD AUTO: 3.6 M/UL (ref 4–5.4)
RELATIVE EOSINOPHIL (OHS): 2.7 %
RELATIVE LYMPHOCYTE (OHS): 52.9 % (ref 18–48)
RELATIVE MONOCYTE (OHS): 10.4 % (ref 4–15)
RELATIVE NEUTROPHIL (OHS): 32.4 % (ref 38–73)
SCHISTOCYTES BLD QL SMEAR: PRESENT
SODIUM SERPL-SCNC: 140 MMOL/L (ref 136–145)
VIT B12 SERPL-MCNC: 886 PG/ML (ref 210–950)
WBC # BLD AUTO: 2.59 K/UL (ref 3.9–12.7)

## 2025-06-13 PROCEDURE — 36415 COLL VENOUS BLD VENIPUNCTURE: CPT | Mod: PN

## 2025-06-13 PROCEDURE — 85025 COMPLETE CBC W/AUTO DIFF WBC: CPT | Mod: PN

## 2025-06-13 PROCEDURE — 82728 ASSAY OF FERRITIN: CPT

## 2025-06-13 PROCEDURE — 82040 ASSAY OF SERUM ALBUMIN: CPT | Mod: PN

## 2025-06-13 PROCEDURE — 82607 VITAMIN B-12: CPT

## 2025-06-19 NOTE — PROGRESS NOTES
Subjective:       Name: Nohelia Rodriguez  : 1960  MRN: [id number]    Chief Complaint   Patient presents with    Anemia, normocytic normochromic     3 month follow up       Patient is in clinic by herself    HPI: Nohelia Rodriguez is a 65 y.o. female presents to discuss the evaluation done to assess Anemia, normocytic normochromic (3 month follow up )    She started taking oral iron daily this week, on top of Vitamin B12.    The patient denies any fever chills night sweats weight loss melena hematochezia hematemesis hematuria. She denies any recent history of traveling or exposure to sick people.     Oncology History    No history exists.        Past Medical History:   Diagnosis Date    Diabetes mellitus     Diabetic retinopathy     s/p laser treatment    Glaucoma     History of cardiovascular stress test      normal    Hypertension     Mixed hyperlipidemia        Past Surgical History:   Procedure Laterality Date    COLONOSCOPY N/A 3/22/2019    Procedure: COLONOSCOPY;  Surgeon: Kishor Membreno MD;  Location: [facility];  Service: Endoscopy;  Laterality: N/A;    diabetic retinopathy laser         Family History   Problem Relation Name Age of Onset    Diabetes Mother      Cataracts Mother      Hypertension Mother      Diabetes Sister      Hypertension Sister      Hyperlipidemia Sister          a fib    Diabetes Brother      Hypertension Brother      Hydrocephalus Son      Diabetes Brother      Diabetes Sister      Diabetes Sister         Social History     Socioeconomic History    Marital status:    Tobacco Use    Smoking status: Never    Smokeless tobacco: Never   Substance and Sexual Activity    Alcohol use: No    Drug use: No    Sexual activity: Not Currently   Social History Narrative    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     Social Drivers of Health     Financial Resource Strain: Low Risk  (2025)    Overall Financial Resource Strain (CARDIA)     Difficulty of Paying Living  "Expenses: Not hard at all   Food Insecurity: Food Insecurity Present (4/13/2025)    Hunger Vital Sign     Worried About Running Out of Food in the Last Year: Sometimes true     Ran Out of Food in the Last Year: Sometimes true   Transportation Needs: No Transportation Needs (4/13/2025)    PRAPARE - Transportation     Lack of Transportation (Medical): No     Lack of Transportation (Non-Medical): No   Physical Activity: Insufficiently Active (4/13/2025)    Exercise Vital Sign     Days of Exercise per Week: 3 days     Minutes of Exercise per Session: 30 min   Stress: No Stress Concern Present (4/13/2025)    [employer] [place] [employer] - Occupational Stress Questionnaire     Feeling of Stress : Not at all   Housing Stability: Low Risk  (4/13/2025)    Housing Stability Vital Sign     Unable to Pay for Housing in the Last Year: No     Homeless in the Last Year: No       Review of patient's allergies indicates:   Allergen Reactions    Lisinopril Edema         ROS:  Answers submitted by the patient for this visit:  Review of Systems Questionnaire (Submitted on 3/15/2024)  appetite change : No  unexpected weight change: No  mouth sores: No  visual disturbance: No  cough: No  shortness of breath: No  chest pain: No  abdominal pain: No  diarrhea: No  frequency: No  back pain: No  rash: No  headaches: No  adenopathy: No  nervous/ anxious: No             Objective:     Vitals:    06/20/25 0850   BP: (!) 148/82   BP Location: Right arm   Patient Position: Sitting   Pulse: 80   Resp: 14   Temp: 97.4 °F (36.3 °C)   TempSrc: Temporal   SpO2: 98%   Weight: 85.1 kg (187 lb 9.8 oz)   Height: 5' 9" (1.753 m)            Physical Exam  Vitals reviewed.   Constitutional:       Appearance: Normal appearance.   HENT:      Head: Normocephalic and atraumatic.   Eyes:      General: No scleral icterus.     Pupils: Pupils are equal, round, and reactive to light.   Cardiovascular:      Rate and Rhythm: Normal rate and regular rhythm. "      Pulses: Normal pulses.      Heart sounds: Normal heart sounds.   Pulmonary:      Effort: Pulmonary effort is normal.      Breath sounds: Normal breath sounds.   Abdominal:      General: Bowel sounds are normal. There is no distension.   Musculoskeletal:         General: No swelling.   Lymphadenopathy:      Cervical: No cervical adenopathy.   Skin:     General: Skin is warm.      Findings: No rash.   Neurological:      General: No focal deficit present.      Mental Status: She is alert and oriented to person, place, and time.      Cranial Nerves: No cranial nerve deficit.      Motor: No weakness.   Psychiatric:         Mood and Affect: Mood normal.         Behavior: Behavior normal.                Current Outpatient Medications on File Prior to Visit   Medication Sig    amLODIPine (NORVASC) 5 MG tablet Take 1 tablet (5 mg total) by mouth once daily.    blood sugar diagnostic Strp To check BG QD times daily, to use with insurance preferred meter    blood-glucose meter kit As directed    carvediloL (COREG) 25 MG tablet TAKE 1 TABLET BY MOUTH TWICE DAILY WITH MEALS    insulin glargine U-100, Lantus, (LANTUS U-100 INSULIN) 100 unit/mL injection 0.02 ml Subcutaneous Once a day for 30 day(s)    latanoprost 0.005 % ophthalmic solution Place 1 drop into both eyes every evening.     losartan (COZAAR) 25 MG tablet 1 tablet Orally Once a day for 30 day(s)    metFORMIN (GLUCOPHAGE) 1000 MG tablet Take 1 tablet (1,000 mg total) by mouth 2 (two) times daily with meals.    ONETOUCH DELICA PLUS LANCET 33 gauge Misc Apply topically 3 (three) times daily.    rosuvastatin (CRESTOR) 10 MG tablet Take 1 tablet (10 mg total) by mouth every evening.    semaglutide (OZEMPIC) 2 mg/dose (8 mg/3 mL) PnIj Inject 2 mg into the skin every 7 days.     No current facility-administered medications on file prior to visit.       CBC:  Lab Results   Component Value Date    WBC 2.59 (L) 06/13/2025    HGB 10.0 (L) 06/13/2025    HCT 30.8 (L)  06/13/2025    MCV 86 06/13/2025     06/13/2025         CMP:  Sodium   Date Value Ref Range Status   06/13/2025 140 136 - 145 mmol/L Final   03/14/2025 141 136 - 145 mmol/L Final     Potassium   Date Value Ref Range Status   06/13/2025 4.2 3.5 - 5.1 mmol/L Final   03/14/2025 3.7 3.5 - 5.1 mmol/L Final     Chloride   Date Value Ref Range Status   06/13/2025 105 95 - 110 mmol/L Final   03/14/2025 106 95 - 110 mmol/L Final     CO2   Date Value Ref Range Status   06/13/2025 26 23 - 29 mmol/L Final   03/14/2025 24 23 - 29 mmol/L Final     Glucose   Date Value Ref Range Status   06/13/2025 123 (H) 70 - 110 mg/dL Final   03/14/2025 111 (H) 70 - 110 mg/dL Final     BUN   Date Value Ref Range Status   06/13/2025 22 8 - 23 mg/dL Final     Creatinine   Date Value Ref Range Status   06/13/2025 1.1 0.5 - 1.4 mg/dL Final     Calcium   Date Value Ref Range Status   06/13/2025 10.0 8.7 - 10.5 mg/dL Final   03/14/2025 10.1 8.7 - 10.5 mg/dL Final     Protein Total   Date Value Ref Range Status   06/13/2025 7.7 6.0 - 8.4 gm/dL Final     Total Protein   Date Value Ref Range Status   03/14/2025 7.7 6.0 - 8.4 g/dL Final     Albumin   Date Value Ref Range Status   06/13/2025 4.1 3.5 - 5.2 g/dL Final   03/14/2025 4.2 3.5 - 5.2 g/dL Final     Total Bilirubin   Date Value Ref Range Status   03/14/2025 0.4 0.1 - 1.0 mg/dL Final     Comment:     For infants and newborns, interpretation of results should be based  on gestational age, weight and in agreement with clinical  observations.    Premature Infant recommended reference ranges:  Up to 24 hours.............<8.0 mg/dL  Up to 48 hours............<12.0 mg/dL  3-5 days..................<15.0 mg/dL  6-29 days.................<15.0 mg/dL       Bilirubin Total   Date Value Ref Range Status   06/13/2025 0.3 0.1 - 1.0 mg/dL Final     Comment:     For infants and newborns, interpretation of results should be based   on gestational age, weight and in agreement with clinical    observations.    Premature Infant recommended reference ranges:   0-24 hours:  <8.0 mg/dL   24-48 hours: <12.0 mg/dL   3-5 days:    <15.0 mg/dL   6-29 days:   <15.0 mg/dL     Alkaline Phosphatase   Date Value Ref Range Status   03/14/2025 45 40 - 150 U/L Final     ALP   Date Value Ref Range Status   06/13/2025 40 40 - 150 unit/L Final     AST   Date Value Ref Range Status   06/13/2025 16 11 - 45 unit/L Final   03/14/2025 18 10 - 40 U/L Final     ALT   Date Value Ref Range Status   06/13/2025 8 (L) 10 - 44 unit/L Final   03/14/2025 11 10 - 44 U/L Final     Anion Gap   Date Value Ref Range Status   06/13/2025 9 8 - 16 mmol/L Final     eGFR if    Date Value Ref Range Status   06/27/2022 >60.0 >60 mL/min/1.73 m^2 Final     eGFR if non    Date Value Ref Range Status   06/27/2022 >60.0 >60 mL/min/1.73 m^2 Final     Comment:     Calculation used to obtain the estimated glomerular filtration  rate (eGFR) is the CKD-EPI equation.          Mammo Digital Screening Bilat w/ Jesse (XPD)  Narrative: Facility:  [facility] [place] [address] [place] [address]  [phone]    Name: Nohelia Rodriguez    MRN: [id number]    Result:  Mammo Digital Screening Bilat w/ Jesse (XPD)    History:  Patient is 65 y.o. and is seen for a screening mammogram.    Films Compared:  Compared to: 04/05/2024 Mammo Digital Screening Bilat w/ Jesse, 04/03/2023   Mammo Digital Screening Bilat w/ Jesse, and 03/07/2022 Mammo Digital   Screening Bilat w/ Jesse     Findings:  This procedure was performed using tomosynthesis.   Computer-aided detection was utilized in the interpretation of this   examination.    There are scattered areas of fibroglandular density. There is no evidence   of suspicious masses, microcalcifications or architectural distortion.  Impression:    No mammographic evidence of malignancy.    BI-RADS Category 1: Negative    Recommendation:  Routine screening mammogram in 1 year is  recommended.    Your estimated lifetime risk of breast cancer (to age 85) based on   Tyrer-Cuzick risk assessment model is 3.35%.  According to the American   Cancer Society, patients with a lifetime breast cancer risk of 20% or   higher might benefit from supplemental screening tests, such as screening   breast MRI.    Electronically signed by,  Nata Venegas MD       ECOG SCORE    1 - Restricted in strenuous activity-ambulatory and able to carry out work of a light nature              Assessment/Plan:       Normocytic anemia:  -Hgb 10 g/dl  Ferritin 76 Vitamin B12 886 on 6/13/2025   -I reviewed independently the patient medical record including her laboratory and radiologic findings.  The patient current workup is showing that she has a chronic normocytic anemia of unknown etiology.  Her labs ruled out vitamin B12 folate thyroid dysfunction as well as iron-deficiency.  - DANISHA came back negative.   -ESR LDH haptoglobin were normal ruling out hemolysisis.  - hemoglobin electrophoresis was negative for sickle cell disease or trait.  -The UA was negative of hematuria.  All workup failed to show a source that could explain normocytic anemia.  She will require a  bone marrow biopsy to assess possibly a bone marrow dysfunction.  The patient decided to proceed with close monitoring with repeat blood workup.    -She was advised to continue taking oral iron and Vitamin B12.  -She will be seen back again in 6 months with repeat CBC CMP ferritin  Vitamin B12.     Leukopenia  -WBC 2.59  on 6/13/2025  -after reviewing her blood workup going to 2019 the patient has been chronically leukopenic.  This could be related ethnic variation and less likely related to blood related disorder.  The patient is asymptomatic and denies any history of recurrent infections.    -We will continue to monitor this abnormality with repeat CBC.    -In case this worsened a bone marrow biopsy will be ordered to assess if there is a  myelodysplastic disorder behind her chronic leukopenia.                     Med Onc Chart Routing      Follow up with physician    Follow up with GRETEL 6 months. with repeat CBC CMP Ferritin and VItamin B12   Infusion scheduling note    Injection scheduling note    Labs    Imaging    Pharmacy appointment    Other referrals                 Plan was discussed with the patient at length, and she verbalized understanding. Nohelia was given an opportunity to ask questions that were answered to her satisfaction, and she was advised to call in the interval if any problems or questions arise.  Signed:  Yolanda Gonzales MD   Hematology and Oncology  [facility] - HEMATOLOGY ONCOLOGY  [facility], [place]

## 2025-06-20 ENCOUNTER — OFFICE VISIT (OUTPATIENT)
Dept: HEMATOLOGY/ONCOLOGY | Facility: CLINIC | Age: 65
End: 2025-06-20
Payer: COMMERCIAL

## 2025-06-20 VITALS
SYSTOLIC BLOOD PRESSURE: 148 MMHG | BODY MASS INDEX: 27.79 KG/M2 | WEIGHT: 187.63 LBS | HEART RATE: 80 BPM | TEMPERATURE: 97 F | OXYGEN SATURATION: 98 % | DIASTOLIC BLOOD PRESSURE: 82 MMHG | HEIGHT: 69 IN | RESPIRATION RATE: 14 BRPM

## 2025-06-20 DIAGNOSIS — D72.819 LEUKOPENIA, UNSPECIFIED TYPE: Primary | ICD-10-CM

## 2025-06-20 DIAGNOSIS — D64.9 ANEMIA, NORMOCYTIC NORMOCHROMIC: ICD-10-CM

## 2025-06-20 PROCEDURE — 99999 PR PBB SHADOW E&M-EST. PATIENT-LVL IV: CPT | Mod: PBBFAC,,, | Performed by: INTERNAL MEDICINE

## 2025-07-11 ENCOUNTER — HOSPITAL ENCOUNTER (OUTPATIENT)
Dept: RADIOLOGY | Facility: HOSPITAL | Age: 65
Discharge: HOME | End: 2025-07-11
Attending: FAMILY MEDICINE
Payer: COMMERCIAL

## 2025-07-11 DIAGNOSIS — Z78.0 MENOPAUSE: ICD-10-CM

## 2025-07-11 PROCEDURE — 77080 DXA BONE DENSITY AXIAL: CPT | Mod: TC,FY,PO

## 2025-07-11 PROCEDURE — 77092 TBS I&R FX RSK QHP: CPT | Mod: ,,, | Performed by: STUDENT IN AN ORGANIZED HEALTH CARE EDUCATION/TRAINING PROGRAM

## 2025-07-11 PROCEDURE — 77080 DXA BONE DENSITY AXIAL: CPT | Mod: 26,,, | Performed by: STUDENT IN AN ORGANIZED HEALTH CARE EDUCATION/TRAINING PROGRAM

## 2025-07-17 ENCOUNTER — PATIENT MESSAGE (OUTPATIENT)
Dept: FAMILY MEDICINE | Facility: CLINIC | Age: 65
End: 2025-07-17
Payer: COMMERCIAL

## 2025-08-21 ENCOUNTER — TELEPHONE (OUTPATIENT)
Dept: OPHTHALMOLOGY | Facility: CLINIC | Age: 65
End: 2025-08-21
Payer: COMMERCIAL

## 2025-08-22 ENCOUNTER — OFFICE VISIT (OUTPATIENT)
Facility: CLINIC | Age: 65
End: 2025-08-22
Payer: COMMERCIAL

## 2025-08-22 VITALS
DIASTOLIC BLOOD PRESSURE: 72 MMHG | BODY MASS INDEX: 27.82 KG/M2 | WEIGHT: 187.81 LBS | HEIGHT: 69 IN | HEART RATE: 87 BPM | SYSTOLIC BLOOD PRESSURE: 130 MMHG

## 2025-08-22 DIAGNOSIS — E11.3513 CONTROLLED TYPE 2 DIABETES MELLITUS WITH BOTH EYES AFFECTED BY PROLIFERATIVE RETINOPATHY AND MACULAR EDEMA, WITH LONG-TERM CURRENT USE OF INSULIN: ICD-10-CM

## 2025-08-22 DIAGNOSIS — I10 PRIMARY HYPERTENSION: Primary | ICD-10-CM

## 2025-08-22 DIAGNOSIS — Z79.4 CONTROLLED TYPE 2 DIABETES MELLITUS WITH BOTH EYES AFFECTED BY PROLIFERATIVE RETINOPATHY AND MACULAR EDEMA, WITH LONG-TERM CURRENT USE OF INSULIN: ICD-10-CM

## 2025-08-27 ENCOUNTER — TELEPHONE (OUTPATIENT)
Dept: OPHTHALMOLOGY | Facility: CLINIC | Age: 65
End: 2025-08-27
Payer: COMMERCIAL